# Patient Record
Sex: MALE | Race: WHITE | NOT HISPANIC OR LATINO | URBAN - METROPOLITAN AREA
[De-identification: names, ages, dates, MRNs, and addresses within clinical notes are randomized per-mention and may not be internally consistent; named-entity substitution may affect disease eponyms.]

---

## 2017-09-01 ENCOUNTER — INPATIENT (INPATIENT)
Facility: HOSPITAL | Age: 82
LOS: 9 days | Discharge: NURSING, REHAB OR LONG-TERM CARE | DRG: 177 | End: 2017-09-11
Attending: HOSPITALIST | Admitting: HOSPITALIST
Payer: MEDICARE

## 2017-09-01 VITALS
DIASTOLIC BLOOD PRESSURE: 83 MMHG | HEART RATE: 110 BPM | WEIGHT: 160.06 LBS | OXYGEN SATURATION: 98 % | SYSTOLIC BLOOD PRESSURE: 148 MMHG | RESPIRATION RATE: 20 BRPM | TEMPERATURE: 97 F | HEIGHT: 70 IN

## 2017-09-01 DIAGNOSIS — N17.9 ACUTE KIDNEY FAILURE, UNSPECIFIED: ICD-10-CM

## 2017-09-01 DIAGNOSIS — R41.82 ALTERED MENTAL STATUS, UNSPECIFIED: ICD-10-CM

## 2017-09-01 DIAGNOSIS — E87.0 HYPEROSMOLALITY AND HYPERNATREMIA: ICD-10-CM

## 2017-09-01 DIAGNOSIS — F03.90 UNSPECIFIED DEMENTIA, UNSPECIFIED SEVERITY, WITHOUT BEHAVIORAL DISTURBANCE, PSYCHOTIC DISTURBANCE, MOOD DISTURBANCE, AND ANXIETY: ICD-10-CM

## 2017-09-01 DIAGNOSIS — R54 AGE-RELATED PHYSICAL DEBILITY: ICD-10-CM

## 2017-09-01 DIAGNOSIS — E86.0 DEHYDRATION: ICD-10-CM

## 2017-09-01 LAB
ALBUMIN SERPL ELPH-MCNC: 3.2 G/DL — LOW (ref 3.3–5)
ALP SERPL-CCNC: 80 U/L — SIGNIFICANT CHANGE UP (ref 30–120)
ALT FLD-CCNC: <10 U/L DA — LOW (ref 10–60)
ANION GAP SERPL CALC-SCNC: 13 MMOL/L — SIGNIFICANT CHANGE UP (ref 5–17)
ANION GAP SERPL CALC-SCNC: 14 MMOL/L — SIGNIFICANT CHANGE UP (ref 5–17)
ANION GAP SERPL CALC-SCNC: 20 MMOL/L — HIGH (ref 5–17)
APPEARANCE UR: ABNORMAL
APTT BLD: 30.3 SEC — SIGNIFICANT CHANGE UP (ref 27.5–37.4)
AST SERPL-CCNC: 13 U/L — SIGNIFICANT CHANGE UP (ref 10–40)
BASOPHILS # BLD AUTO: 0.3 K/UL — HIGH (ref 0–0.2)
BASOPHILS NFR BLD AUTO: 1.6 % — SIGNIFICANT CHANGE UP (ref 0–2)
BILIRUB SERPL-MCNC: 0.5 MG/DL — SIGNIFICANT CHANGE UP (ref 0.2–1.2)
BILIRUB UR-MCNC: ABNORMAL
BUN SERPL-MCNC: 73 MG/DL — HIGH (ref 7–23)
BUN SERPL-MCNC: 75 MG/DL — HIGH (ref 7–23)
BUN SERPL-MCNC: 89 MG/DL — HIGH (ref 7–23)
CALCIUM SERPL-MCNC: 7.8 MG/DL — LOW (ref 8.4–10.5)
CALCIUM SERPL-MCNC: 8.3 MG/DL — LOW (ref 8.4–10.5)
CALCIUM SERPL-MCNC: 9.6 MG/DL — SIGNIFICANT CHANGE UP (ref 8.4–10.5)
CHLORIDE SERPL-SCNC: 123 MMOL/L — HIGH (ref 96–108)
CHLORIDE SERPL-SCNC: 125 MMOL/L — HIGH (ref 96–108)
CHLORIDE SERPL-SCNC: 126 MMOL/L — HIGH (ref 96–108)
CO2 SERPL-SCNC: 21 MMOL/L — LOW (ref 22–31)
CO2 SERPL-SCNC: 21 MMOL/L — LOW (ref 22–31)
CO2 SERPL-SCNC: 22 MMOL/L — SIGNIFICANT CHANGE UP (ref 22–31)
COLOR SPEC: YELLOW — SIGNIFICANT CHANGE UP
CREAT SERPL-MCNC: 3.39 MG/DL — HIGH (ref 0.5–1.3)
CREAT SERPL-MCNC: 3.46 MG/DL — HIGH (ref 0.5–1.3)
CREAT SERPL-MCNC: 3.93 MG/DL — HIGH (ref 0.5–1.3)
DIFF PNL FLD: ABNORMAL
EOSINOPHIL # BLD AUTO: 0.4 K/UL — SIGNIFICANT CHANGE UP (ref 0–0.5)
EOSINOPHIL NFR BLD AUTO: 2.1 % — SIGNIFICANT CHANGE UP (ref 0–6)
FLUAV SPEC QL CULT: NEGATIVE — SIGNIFICANT CHANGE UP
FLUBV AG SPEC QL IA: NEGATIVE — SIGNIFICANT CHANGE UP
GLUCOSE SERPL-MCNC: 100 MG/DL — HIGH (ref 70–99)
GLUCOSE SERPL-MCNC: 118 MG/DL — HIGH (ref 70–99)
GLUCOSE SERPL-MCNC: 133 MG/DL — HIGH (ref 70–99)
GLUCOSE UR QL: NEGATIVE MG/DL — SIGNIFICANT CHANGE UP
HCT VFR BLD CALC: 46.7 % — SIGNIFICANT CHANGE UP (ref 39–50)
HCT VFR BLD CALC: 56.9 % — HIGH (ref 39–50)
HGB BLD-MCNC: 14.2 G/DL — SIGNIFICANT CHANGE UP (ref 13–17)
HGB BLD-MCNC: 17 G/DL — SIGNIFICANT CHANGE UP (ref 13–17)
INR BLD: 1.13 RATIO — SIGNIFICANT CHANGE UP (ref 0.88–1.16)
KETONES UR-MCNC: ABNORMAL
LACTATE SERPL-SCNC: 1.8 MMOL/L — SIGNIFICANT CHANGE UP (ref 0.7–2)
LEUKOCYTE ESTERASE UR-ACNC: ABNORMAL
LYMPHOCYTES # BLD AUTO: 1.8 K/UL — SIGNIFICANT CHANGE UP (ref 1–3.3)
LYMPHOCYTES # BLD AUTO: 9.4 % — LOW (ref 13–44)
MCHC RBC-ENTMCNC: 29.3 PG — SIGNIFICANT CHANGE UP (ref 27–34)
MCHC RBC-ENTMCNC: 29.9 GM/DL — LOW (ref 32–36)
MCHC RBC-ENTMCNC: 29.9 PG — SIGNIFICANT CHANGE UP (ref 27–34)
MCHC RBC-ENTMCNC: 30.5 GM/DL — LOW (ref 32–36)
MCV RBC AUTO: 98 FL — SIGNIFICANT CHANGE UP (ref 80–100)
MCV RBC AUTO: 98.2 FL — SIGNIFICANT CHANGE UP (ref 80–100)
MONOCYTES # BLD AUTO: 1.1 K/UL — HIGH (ref 0–0.9)
MONOCYTES NFR BLD AUTO: 5.7 % — SIGNIFICANT CHANGE UP (ref 2–14)
NEUTROPHILS # BLD AUTO: 15.6 K/UL — HIGH (ref 1.8–7.4)
NEUTROPHILS NFR BLD AUTO: 81.2 % — HIGH (ref 43–77)
NITRITE UR-MCNC: NEGATIVE — SIGNIFICANT CHANGE UP
PH UR: 5 — SIGNIFICANT CHANGE UP (ref 5–8)
PLATELET # BLD AUTO: 406 K/UL — HIGH (ref 150–400)
PLATELET # BLD AUTO: 499 K/UL — HIGH (ref 150–400)
POTASSIUM SERPL-MCNC: 4.2 MMOL/L — SIGNIFICANT CHANGE UP (ref 3.5–5.3)
POTASSIUM SERPL-MCNC: 4.9 MMOL/L — SIGNIFICANT CHANGE UP (ref 3.5–5.3)
POTASSIUM SERPL-MCNC: 5.2 MMOL/L — SIGNIFICANT CHANGE UP (ref 3.5–5.3)
POTASSIUM SERPL-SCNC: 4.2 MMOL/L — SIGNIFICANT CHANGE UP (ref 3.5–5.3)
POTASSIUM SERPL-SCNC: 4.9 MMOL/L — SIGNIFICANT CHANGE UP (ref 3.5–5.3)
POTASSIUM SERPL-SCNC: 5.2 MMOL/L — SIGNIFICANT CHANGE UP (ref 3.5–5.3)
PROT SERPL-MCNC: 8.4 G/DL — HIGH (ref 6–8.3)
PROT UR-MCNC: 30 MG/DL
PROTHROM AB SERPL-ACNC: 12.3 SEC — SIGNIFICANT CHANGE UP (ref 9.8–12.7)
RBC # BLD: 4.75 M/UL — SIGNIFICANT CHANGE UP (ref 4.2–5.8)
RBC # BLD: 5.8 M/UL — SIGNIFICANT CHANGE UP (ref 4.2–5.8)
RBC # FLD: 15.8 % — HIGH (ref 10.3–14.5)
RBC # FLD: 15.9 % — HIGH (ref 10.3–14.5)
SODIUM SERPL-SCNC: 160 MMOL/L — CRITICAL HIGH (ref 135–145)
SODIUM SERPL-SCNC: 161 MMOL/L — CRITICAL HIGH (ref 135–145)
SODIUM SERPL-SCNC: 164 MMOL/L — CRITICAL HIGH (ref 135–145)
SP GR SPEC: 1.02 — SIGNIFICANT CHANGE UP (ref 1.01–1.02)
T3 SERPL-MCNC: 54 NG/DL — LOW (ref 80–200)
T4 AB SER-ACNC: 5.5 UG/DL — SIGNIFICANT CHANGE UP (ref 4.6–12)
TROPONIN I SERPL-MCNC: 0.01 NG/ML — LOW (ref 0.02–0.06)
TSH SERPL-MCNC: 2.08 UIU/ML — SIGNIFICANT CHANGE UP (ref 0.27–4.2)
UROBILINOGEN FLD QL: 1 MG/DL
VIT B12 SERPL-MCNC: 770 PG/ML — SIGNIFICANT CHANGE UP (ref 243–894)
WBC # BLD: 17.6 K/UL — HIGH (ref 3.8–10.5)
WBC # BLD: 19.3 K/UL — HIGH (ref 3.8–10.5)
WBC # FLD AUTO: 17.6 K/UL — HIGH (ref 3.8–10.5)
WBC # FLD AUTO: 19.3 K/UL — HIGH (ref 3.8–10.5)

## 2017-09-01 PROCEDURE — 93010 ELECTROCARDIOGRAM REPORT: CPT

## 2017-09-01 PROCEDURE — 99223 1ST HOSP IP/OBS HIGH 75: CPT | Mod: AI

## 2017-09-01 PROCEDURE — 71010: CPT | Mod: 26

## 2017-09-01 PROCEDURE — 99285 EMERGENCY DEPT VISIT HI MDM: CPT

## 2017-09-01 PROCEDURE — 70450 CT HEAD/BRAIN W/O DYE: CPT | Mod: 26

## 2017-09-01 RX ORDER — SODIUM CHLORIDE 9 MG/ML
1000 INJECTION, SOLUTION INTRAVENOUS
Qty: 0 | Refills: 0 | Status: DISCONTINUED | OUTPATIENT
Start: 2017-09-01 | End: 2017-09-01

## 2017-09-01 RX ORDER — PIPERACILLIN AND TAZOBACTAM 4; .5 G/20ML; G/20ML
4.5 INJECTION, POWDER, LYOPHILIZED, FOR SOLUTION INTRAVENOUS ONCE
Qty: 0 | Refills: 0 | Status: DISCONTINUED | OUTPATIENT
Start: 2017-09-01 | End: 2017-09-01

## 2017-09-01 RX ORDER — RISPERIDONE 4 MG/1
0.25 TABLET ORAL AT BEDTIME
Qty: 0 | Refills: 0 | Status: DISCONTINUED | OUTPATIENT
Start: 2017-09-01 | End: 2017-09-11

## 2017-09-01 RX ORDER — HEPARIN SODIUM 5000 [USP'U]/ML
5000 INJECTION INTRAVENOUS; SUBCUTANEOUS EVERY 12 HOURS
Qty: 0 | Refills: 0 | Status: DISCONTINUED | OUTPATIENT
Start: 2017-09-01 | End: 2017-09-11

## 2017-09-01 RX ORDER — ACETAMINOPHEN 500 MG
725 TABLET ORAL EVERY 8 HOURS
Qty: 0 | Refills: 0 | Status: DISCONTINUED | OUTPATIENT
Start: 2017-09-01 | End: 2017-09-01

## 2017-09-01 RX ORDER — SODIUM CHLORIDE 9 MG/ML
1500 INJECTION INTRAMUSCULAR; INTRAVENOUS; SUBCUTANEOUS ONCE
Qty: 0 | Refills: 0 | Status: COMPLETED | OUTPATIENT
Start: 2017-09-01 | End: 2017-09-01

## 2017-09-01 RX ORDER — ACETAMINOPHEN 500 MG
650 TABLET ORAL EVERY 8 HOURS
Qty: 0 | Refills: 0 | Status: DISCONTINUED | OUTPATIENT
Start: 2017-09-01 | End: 2017-09-11

## 2017-09-01 RX ORDER — VANCOMYCIN HCL 1 G
1000 VIAL (EA) INTRAVENOUS ONCE
Qty: 0 | Refills: 0 | Status: COMPLETED | OUTPATIENT
Start: 2017-09-01 | End: 2017-09-01

## 2017-09-01 RX ORDER — PIPERACILLIN AND TAZOBACTAM 4; .5 G/20ML; G/20ML
3.38 INJECTION, POWDER, LYOPHILIZED, FOR SOLUTION INTRAVENOUS ONCE
Qty: 0 | Refills: 0 | Status: COMPLETED | OUTPATIENT
Start: 2017-09-01 | End: 2017-09-01

## 2017-09-01 RX ORDER — SODIUM CHLORIDE 9 MG/ML
1000 INJECTION, SOLUTION INTRAVENOUS
Qty: 0 | Refills: 0 | Status: DISCONTINUED | OUTPATIENT
Start: 2017-09-01 | End: 2017-09-11

## 2017-09-01 RX ADMIN — Medication 250 MILLIGRAM(S): at 10:44

## 2017-09-01 RX ADMIN — SODIUM CHLORIDE 100 MILLILITER(S): 9 INJECTION, SOLUTION INTRAVENOUS at 17:54

## 2017-09-01 RX ADMIN — PIPERACILLIN AND TAZOBACTAM 200 GRAM(S): 4; .5 INJECTION, POWDER, LYOPHILIZED, FOR SOLUTION INTRAVENOUS at 10:44

## 2017-09-01 RX ADMIN — SODIUM CHLORIDE 1500 MILLILITER(S): 9 INJECTION INTRAMUSCULAR; INTRAVENOUS; SUBCUTANEOUS at 10:12

## 2017-09-01 RX ADMIN — SODIUM CHLORIDE 100 MILLILITER(S): 9 INJECTION, SOLUTION INTRAVENOUS at 22:34

## 2017-09-01 RX ADMIN — HEPARIN SODIUM 5000 UNIT(S): 5000 INJECTION INTRAVENOUS; SUBCUTANEOUS at 21:53

## 2017-09-01 RX ADMIN — RISPERIDONE 0.25 MILLIGRAM(S): 4 TABLET ORAL at 21:53

## 2017-09-01 NOTE — ED ADULT TRIAGE NOTE - NS ED TRIAGE AVPU SCALE
Alert-The patient is alert, awake and responds to voice. The patient is oriented to time, place, and person. The triage nurse is able to obtain subjective information. Verbal - The patient responds to verbal stimuli by opening their eyes when someone speaks to them. The patient is not fully oriented to time, place, or person.

## 2017-09-01 NOTE — CONSULT NOTE ADULT - SUBJECTIVE AND OBJECTIVE BOX
HPI:  88 male hx Dementia came from homewith CC of  AMS/lethargy per home  aide. No fever chills n/v/diarrhea. Has been weak with poor po intake. In ER  No fever. WBC 19K with BRAYDEN and hypernatremia. RVP with Enterovirus. Per  documentation pt recently treated for sinusitis with Augmentin x 10 days.  Pt unable to provide hx.     Infectious Disease consult was called to evaluate pt due to leukocytosis and  RVP +Enterovirus    Past Medical & Surgical Hx:  PAST MEDICAL & SURGICAL HISTORY:  Dementia with behavioral disturbance  No significant past surgical history      Social History--  EtOH: denies  Tobacco: denies   Drug Use: denies   Lives at home    Travel/Environmental/Occupational History:  NONE    FAMILY HISTORY:  No pertinent family history in first degree relatives      No Known Allergies    Home Medications:   * Patient Currently Takes Medications as of 01-Sep-2017 08:16 documented in Prescription Writer  · 	risperiDONE 0.25 mg oral tablet:  orally once a day (at bedtime), Last Dose Taken:    · 	Augmentin 875 mg-125 mg oral tablet: 1 tab(s) orally every 12 hours, Last Dose Taken:         Current Inpatient Medications :    ANTIBIOTICS:   NONE    OTHER RELEVANT MEDICATIONS :  heparin  Injectable 5000 Unit(s) SubCutaneous every 12 hours  risperiDONE   Tablet 0.25 milliGRAM(s) Oral at bedtime  acetaminophen    Suspension. 650 milliGRAM(s) Oral every 8 hours PRN  dextrose 5%. 1000 milliLiter(s) IV Continuous <Continuous>          REVIEW OF SYSTEMS:    ROS:  Unable to obtain due to : confused and lethargic    I&O's Detail    01 Sep 2017 07:01  -  01 Sep 2017 23:07  --------------------------------------------------------  IN:    dextrose 5% + sodium chloride 0.45%.: 300 mL    dextrose 5%.: 100 mL    Oral Fluid: 50 mL  Total IN: 450 mL    OUT:  Total OUT: 0 mL    Total NET: 450 mL    Physical Exam:  Vital Signs Last 24 Hrs  T(C): 36.1 (01 Sep 2017 20:00), Max: 36.6 (01 Sep 2017 11:22)  T(F): 97 (01 Sep 2017 20:00), Max: 97.8 (01 Sep 2017 11:22)  HR: 77 (01 Sep 2017 22:00) (69 - 110)  BP: 124/62 (01 Sep 2017 22:00) (94/56 - 148/83)  BP(mean): 80 (01 Sep 2017 22:00) (69 - 93)  RR: 22 (01 Sep 2017 22:00) (13 - 22)  SpO2: 100% (01 Sep 2017 22:00) (93% - 100%)  Height (cm): 175.26 (09-01 @ 11:22)  Weight (kg): 72.6 (09-01 @ 11:22)  BMI (kg/m2): 23.6 (09-01 @ 11:22)  BSA (m2): 1.88 (09-01 @ 11:22)      GEN: confused lethargic no distress  HEENT: normocephalic and atraumatic. EOMI. JOSE ALFREDO. Moist mucosa. Clear Posterior pharynx.  NECK: Supple. No carotid bruits.  No lymphadenopathy or thyromegaly.  LUNGS: Clear to auscultation.  HEART: Regular rate and rhythm without murmur.  ABDOMEN: Soft, nontender, and nondistended.  Positive bowel sounds.  No hepatosplenomegaly was noted.  EXTREMITIES: Without any cyanosis, clubbing, rash, lesions or edema.  NEUROLOGIC:  confused lethargic no focal deficit  SKIN: No ulceration or induration present.    Labs:     09-01    161<HH>  |  126<H>  |  73<H>  ----------------------------<  133<H>  4.2   |  22  |  3.39<H>    Ca    7.8<L>      01 Sep 2017 21:50    TPro  8.4<H>  /  Alb  3.2<L>  /  TBili  0.5  /  DBili  x   /  AST  13  /  ALT  <10<L>  /  AlkPhos  80  09-01                          14.2   17.6  )-----------( 406      ( 01 Sep 2017 17:17 )             46.7       PT/INR - ( 01 Sep 2017 08:53 )   PT: 12.3 sec;   INR: 1.13 ratio         PTT - ( 01 Sep 2017 08:53 )  PTT:30.3 sec  Urinalysis Basic - ( 01 Sep 2017 09:52 )    Color: x / Appearance: x / SG: x / pH: x  Gluc: x / Ketone: x  / Bili: x / Urobili: x   Blood: x / Protein: x / Nitrite: x   Leuk Esterase: x / RBC: 0-2 /HPF / WBC 3-5   Sq Epi: x / Non Sq Epi: Few / Bacteria: Few      LIVER FUNCTIONS - ( 01 Sep 2017 08:53 )  Alb: 3.2 g/dL / Pro: 8.4 g/dL / ALK PHOS: 80 U/L / ALT: <10 U/L DA / AST: 13 U/L / GGT: x           CARDIAC MARKERS ( 01 Sep 2017 08:53 )  .005 ng/mL / x     / x     / x     / x        Rapid Respiratory Viral Panel (09.01.17 @ 09:52)    Entero/Rhinovirus (RapRVP): Detected    Rapid RVP Result: Detected: The [employer] RVP Rapid uses polymerase chain reaction (PCR) and melt  curve analysis to screen for adenovirus; coronavirus HKU1, NL63, 229E,  OC43; human metapneumovirus (hMPV); human enterovirus/rhinovirus  (Entero/RV); influenza A; influenza A/H1;Detected: influenza A/H3; influenza  A/H1-2009; influenza B; parainfluenza viruses 1, 2, 3, 4; respiratory  syncytial virus; Bordetella pertussis; Mycoplasma pneumoniae; and  Chlamydophila pneumoniae.        RECENT CULTURES:  PENDING      RADIOLOGY & ADDITIONAL STUDIES:  EXAM:  CHEST-PORTABLE URGENT                          PROCEDURE DATE:  09/01/2017          INTERPRETATION:  Clinical information: Altered mental status    No prior studies present for comparison  Portable study, 9:06 AM  Cardiac monitor leads present. Elevated diaphragm, shallow inspiration.   The aorta shows atherosclerotic changes. The lungs are clear. There is no   sign of infiltrate effusion or congestive failure. Heart size is within   normal limits. Hilar regions and mediastinal contours are unremarkable.    Old left rib fracture present.    IMPRESSION:    No active disease.        Assessment :   88 year old male Dementia admitted with mental status change and BRAYDEN   Severe dehydration with hypernatremia  Enterovirus infection ?bronchitis though no pulm symptoms Poss false positive  Leukocytosis reactive    Plan :   Defer antibiotics  Trend wbc  Fu cultures  IVF  Monitor renal fx  Aspiration precautions    Continue with present regiment .  Appropriate use of antibiotics and adverse effects reviewed.      > 35 minutes spent in direct patient care reviewing  the notes, lab data/ imaging , discussion with multidisciplinary team. All questions were addressed and answered to the best of my capacity .    Thank you for allowing me to participate in the care of your patient .    Rik Erwin MD  Infectious Disease  [phone]

## 2017-09-01 NOTE — ED ADULT NURSE NOTE - OBJECTIVE STATEMENT
88 year old male sent into ER by home health aid after she states patient not in his usual mental state.   Pt arrives awake unable to speak  mucous membranes extremely dry  positive tenting noted of skin

## 2017-09-01 NOTE — CONSULT NOTE ADULT - PROBLEM SELECTOR RECOMMENDATION 4
known hx of dementia  supportive care  oral and skin care  asp prec  HOB elevation  pt is DNR DNI  prognosis guarded  will follow

## 2017-09-01 NOTE — SWALLOW BEDSIDE ASSESSMENT ADULT - COMMENTS
Pt alert, confused, admitted with encephalopathy.  Pt with xerostomia, oral care attempted h/e pt became agitated.  P also became agitated during PO trials.  He presents with oropharyngeal dysphagia characterized by reduced orientation, reduced oral grading, labored manipulation of the bolus, latent AP transport, swallow delay. Over s/s of aspiration noted for thin liquids.  Pt at increased risk of aspiration secondary to cognitive deficits. he is safely tolerating dysphagia 1 puree with nectar thickened liquids. Discussed with Dr. Arzate.

## 2017-09-01 NOTE — PROGRESS NOTE ADULT - SUBJECTIVE AND OBJECTIVE BOX
Patient is a 88y old  Male who presents with a chief complaint of lethargy/AMS (01 Sep 2017 11:05)      BRIEF HOSPITAL COURSE: 87 y/o M with a h/o dementia presents from home (with HHA) with AMS, lethargy, unable to ambulate with walker. As per report, poor PO intake recently. Clinically dehydrated. Noted to be profoundly hypernatremic (Na: 164) and with BRAYDEN. (+)Entero/rhinovirus. CT head neg for acute events. Patient is confused, disoriented, and lethargic, not following commands and not answering questions. Will occasionally shout and moan at random. No report of seizure activity. Hemodynamically stable.       PAST MEDICAL & SURGICAL HISTORY:  Dementia with behavioral disturbance  No significant past surgical history      Review of Systems: Unable to obtain secondary to mental status.  CONSTITUTIONAL: No fever, chills, or fatigue  EYES: No eye pain, visual disturbances, or discharge  ENMT:  No difficulty hearing, tinnitus, vertigo; No sinus or throat pain  NECK: No pain or stiffness  RESPIRATORY: No cough, wheezing, chills or hemoptysis; No shortness of breath  CARDIOVASCULAR: No chest pain, palpitations, dizziness, or leg swelling  GASTROINTESTINAL: No abdominal or epigastric pain. No nausea, vomiting, or hematemesis; No diarrhea or constipation. No melena or hematochezia.  GENITOURINARY: No dysuria, frequency, hematuria, or incontinence  NEUROLOGICAL: No headaches, memory loss, loss of strength, numbness, or tremors  SKIN: No itching, burning, rashes, or lesions   MUSCULOSKELETAL: No joint pain or swelling; No muscle, back, or extremity pain  PSYCHIATRIC: No depression, anxiety, mood swings, or difficulty sleeping      Medications:        risperiDONE   Tablet 0.25 milliGRAM(s) Oral at bedtime  acetaminophen    Suspension. 650 milliGRAM(s) Oral every 8 hours PRN  heparin  Injectable 5000 Unit(s) SubCutaneous every 12 hours  dextrose 5% + sodium chloride 0.45%. 1000 milliLiter(s) IV Continuous <Continuous>          ICU Vital Signs Last 24 Hrs  T(C): 36.4 (01 Sep 2017 18:45), Max: 36.6 (01 Sep 2017 11:22)  T(F): 97.5 (01 Sep 2017 18:45), Max: 97.8 (01 Sep 2017 11:22)  HR: 69 (01 Sep 2017 20:00) (69 - 110)  BP: 122/50 (01 Sep 2017 20:00) (94/56 - 148/83)  BP(mean): 69 (01 Sep 2017 20:00) (69 - 93)  ABP: --  ABP(mean): --  RR: 17 (01 Sep 2017 20:00) (13 - 21)  SpO2: 93% (01 Sep 2017 20:00) (93% - 100%)          I&O's Detail    01 Sep 2017 07:01  -  01 Sep 2017 21:25  --------------------------------------------------------  IN:    dextrose 5% + sodium chloride 0.45%.: 200 mL  Total IN: 200 mL    OUT:  Total OUT: 0 mL    Total NET: 200 mL            LABS:                        14.2   17.6  )-----------( 406      ( 01 Sep 2017 17:17 )             46.7     09-01    160<HH>  |  125<H>  |  75<H>  ----------------------------<  118<H>  4.9   |  21<L>  |  3.46<H>    Ca    8.3<L>      01 Sep 2017 17:17    TPro  8.4<H>  /  Alb  3.2<L>  /  TBili  0.5  /  DBili  x   /  AST  13  /  ALT  <10<L>  /  AlkPhos  80  09-01      CARDIAC MARKERS ( 01 Sep 2017 08:53 )  .005 ng/mL / x     / x     / x     / x          CAPILLARY BLOOD GLUCOSE        PT/INR - ( 01 Sep 2017 08:53 )   PT: 12.3 sec;   INR: 1.13 ratio         PTT - ( 01 Sep 2017 08:53 )  PTT:30.3 sec  Urinalysis Basic - ( 01 Sep 2017 09:52 )    Color: x / Appearance: x / SG: x / pH: x  Gluc: x / Ketone: x  / Bili: x / Urobili: x   Blood: x / Protein: x / Nitrite: x   Leuk Esterase: x / RBC: 0-2 /HPF / WBC 3-5   Sq Epi: x / Non Sq Epi: Few / Bacteria: Few      CULTURES:  Rapid RVP Result: Detected (01 Sep 2017 09:52)      Physical Examination:    General: Frail, cachectic, Confused, disoriented, does not respond appropriately to questioning, does not follow commands    HEENT: Pupils equal, reactive to light.  Symmetric.    PULM: diminished bilaterally, no significant sputum production    CVS: Regular rate and rhythm, no murmurs, rubs, or gallops    ABD: Soft, nondistended, nontender, normoactive bowel sounds, no masses    EXT: No edema, nontender    SKIN: Warm and well perfused, no rashes noted.    RADIOLOGY: < from: Xray Chest 1 View AP- PORTABLE-Urgent (09.01.17 @ 09:14) >  No prior studies present for comparison  Portable study, 9:06 AM  Cardiac monitor leads present. Elevated diaphragm, shallow inspiration.   The aorta shows atherosclerotic changes. The lungs are clear. There is no   sign of infiltrate effusion or congestive failure. Heart size is within   normal limits. Hilar regions and mediastinal contours are unremarkable.    Old left rib fracture present.    IMPRESSION:    No active disease.    < from: CT Head No Cont (09.01.17 @ 09:06) >  Images demonstrate  atrophic changes. Mild periventricular white matter   hypoattenuation, microvascular ischemic change. There is no sign of   mass-effect midline shift epidural or subdural collection. There is no   sign of acute or recent hemorrhage. No unusual calcifications are noted.    The calvarium appears intact. The right maxillary sinus is completely   opacified. There is disruption of the medial wall of the right maxillary   sinus with extension  of softtissue material into  the right nasal   cavity. Mucosal disease, right ethmoid and right frontal sinus.       IMPRESSION: No evidence of acute intracranial pathology, see above report.        CRITICAL CARE TIME SPENT: 44 mins

## 2017-09-01 NOTE — H&P ADULT - HISTORY OF PRESENT ILLNESS
88 male from home came in with AMS/lethargy.  Patient likely has dementia and is altered from baseline - unable to provide history.  Spoke to Valery - ?HCP/caretaker; she will stop by late morning to bring in papers such as advance directives, HCP etc.  Per ER RN/DO, patient able to ambulate with walker, eats soft vegan food.

## 2017-09-01 NOTE — SWALLOW BEDSIDE ASSESSMENT ADULT - SWALLOW EVAL: RECOMMENDED FEEDING/EATING TECHNIQUES
check mouth frequently for oral residue/pocketing/crush medication (when feasible)/no straws/position upright (90 degrees)/small sips/bites/allow for swallow between intakes/alternate food with liquid/maintain upright posture during/after eating for 30 mins/oral hygiene

## 2017-09-01 NOTE — H&P ADULT - NSHPLABSRESULTS_GEN_ALL_CORE
WBC Count: 19.3 K/uL <H> (09-01 @ 08:53)  Platelet Count - Automated: 499 K/uL <H> (09-01 @ 08:53)  Hematocrit: 56.9 % <H> (09-01 @ 08:53)  RBC Count: 5.80 M/uL (09-01 @ 08:53)  Hemoglobin: 17.0 g/dL (09-01 @ 08:53)      09-01    164<HH>  |  123<H>  |  89<H>  ----------------------------<  100<H>  5.2   |  21<L>  |  3.93<H>    Ca    9.6      01 Sep 2017 08:53    TPro  8.4<H>  /  Alb  3.2<L>  /  TBili  0.5  /  DBili  x   /  AST  13  /  ALT  <10<L>  /  AlkPhos  80  09-01          CARDIAC MARKERS ( 01 Sep 2017 08:53 )  .005 ng/mL / x     / x     / x     / x            PT/INR - ( 01 Sep 2017 08:53 )   PT: 12.3 sec;   INR: 1.13 ratio         PTT - ( 01 Sep 2017 08:53 )  PTT:30.3 sec    Urinalysis Basic - ( 01 Sep 2017 09:52 )    Color: x / Appearance: x / SG: x / pH: x  Gluc: x / Ketone: x  / Bili: x / Urobili: x   Blood: x / Protein: x / Nitrite: x   Leuk Esterase: x / RBC: 0-2 /HPF / WBC 3-5   Sq Epi: x / Non Sq Epi: Few / Bacteria: Few        Alanine Aminotransferase (ALT/SGPT): <10 U/L DA <L> (09-01 @ 08:53)  Alkaline Phosphatase, Serum: 80 U/L (09-01 @ 08:53)  Bilirubin Total, Serum: 0.5 mg/dL (09-01 @ 08:53)  Aspartate Aminotransferase (AST/SGOT): 13 U/L (09-01 @ 08:53)  Albumin, Serum: 3.2 g/dL <L> (09-01 @ 08:53)      Lactate, Blood: 1.8 mmol/L (09-01 @ 08:53)    CXR: clear  CT head: no m/s/b

## 2017-09-01 NOTE — CONSULT NOTE ADULT - PROBLEM SELECTOR RECOMMENDATION 9
metabolic hypernatremia  possible viral syndrome  BRAYDEN
fall prec  skin care  oral care  pt is DNR DNI as per HCP, she will bring paperwork  supportive care  nutrition  pt is a Vegan as per HCP

## 2017-09-01 NOTE — PHYSICAL THERAPY INITIAL EVALUATION ADULT - ADDITIONAL COMMENTS
Pt lives in home has 24hour HHA/7 days. Uses a cane for ambulation. Owns a wheelchair. Needs assist with dressing. As per aide independent with toileting.

## 2017-09-01 NOTE — SWALLOW BEDSIDE ASSESSMENT ADULT - ORAL PHASE
Decreased anterior-posterior movement of the bolus Decreased anterior-posterior movement of the bolus/Delayed oral transit time/Lingual stasis

## 2017-09-01 NOTE — CONSULT NOTE ADULT - SUBJECTIVE AND OBJECTIVE BOX
Date/Time Patient Seen:  		  Referring MD:   Data Reviewed	       Patient is a 88y old  Male who presents with a chief complaint of weakness, ams, frailty, dec PO intake      Subjective/HPI    seen and examined, poor historian, information obtained from HCP and ER provider note  pt in bed  seen and examined, vs and meds reviewed, documentation reviewed, no old records   as per HCP - lives at home with HHA and has minimal indep. function  pt is a Vegan, no recent hospitalization as per HCP       PAST MEDICAL & SURGICAL HISTORY:  No pertinent past medical history  No significant past surgical history        Medication list         MEDICATIONS  (STANDING):    MEDICATIONS  (PRN):         Vitals log        ICU Vital Signs Last 24 Hrs  T(C): 36.2 (01 Sep 2017 08:17), Max: 36.2 (01 Sep 2017 08:17)  T(F): 97.2 (01 Sep 2017 08:17), Max: 97.2 (01 Sep 2017 08:17)  HR: 92 (01 Sep 2017 09:48) (92 - 110)  BP: 94/56 (01 Sep 2017 09:48) (94/56 - 148/83)  BP(mean): --  ABP: --  ABP(mean): --  RR: 21 (01 Sep 2017 09:48) (20 - 21)  SpO2: 99% (01 Sep 2017 09:48) (98% - 99%)           Input and Output:  I&O's Detail      Lab Data                        17.0   19.3  )-----------( 499      ( 01 Sep 2017 08:53 )             56.9     09-01    164<HH>  |  123<H>  |  89<H>  ----------------------------<  100<H>  5.2   |  21<L>  |  3.93<H>    Ca    9.6      01 Sep 2017 08:53    TPro  8.4<H>  /  Alb  3.2<L>  /  TBili  0.5  /  DBili  x   /  AST  13  /  ALT  <10<L>  /  AlkPhos  80  09-01      CARDIAC MARKERS ( 01 Sep 2017 08:53 )  .005 ng/mL / x     / x     / x     / x        non smoker  non drinker  [employer]ired [employer]  lives at home  has a HHA      Review of Systems	  weak  frail  ams      Objective     Physical Examination    head at  heart - s1s2  lungs - dec BS  abd - soft  extr - dec EDEMA      Pertinent Lab findings & Imaging      Meier:  NO   Adequate UO     I&O's Detail           Discussed with:     Cultures:	        Radiology    EXAM:  CHEST-PORTABLE URGENT                                  PROCEDURE DATE:  09/01/2017          INTERPRETATION:  Clinical information: Altered mental status    No prior studies present for comparison  Portable study, 9:06 AM  Cardiac monitor leads present. Elevated diaphragm, shallow inspiration.   The aorta shows atherosclerotic changes. The lungs are clear. There is no   sign of infiltrate effusion or congestive failure. Heart size is within   normal limits. Hilar regions and mediastinal contours are unremarkable.    Old left rib fracture present.    IMPRESSION:    No active disease.                  MYRTLE MENENDEZ M.D.,ATTENDING RADIOLOGIST  This document has been electronically signed. Sep  1 2017  9:38AM

## 2017-09-01 NOTE — PROGRESS NOTE ADULT - PROBLEM SELECTOR PLAN 1
Likely related to dehydration. Continue IVF hydration with D5+0.45% NS @ 100 mL/hr. Repeat BMP showed Na of 160. Will trend BMP q 4 hrs. Will not overcorrect Na, max 10 mmol/L in initial 24 hours. Monitor mental status, neuro checks. Likely related to dehydration. Continue IVF hydration with D5+0.45% NS @ 100 mL/hr. Repeat BMP showed Na of 160. Will trend BMP q 4 hrs. Will not overcorrect Na, max 10 mmol/L in initial 24 hours. Monitor mental status, neuro checks. Will encourage PO fluid intake as long as patient is able to swallow and is protecting airway. Likely related to dehydration. Repeat BMP showed Na of 161, up from prior repeat of 160. Will change IVF to D5W @ 100mL/hr. Will trend BMP q 4 hrs. Will not overcorrect Na, max 10 mmol/L in initial 24 hours. Monitor mental status, neuro checks. Will encourage PO fluid intake as long as patient is able to swallow and is protecting airway. As per RN, patient is at risk for aspiration, will hold PO fluids for now.

## 2017-09-01 NOTE — CONSULT NOTE ADULT - PROBLEM SELECTOR RECOMMENDATION 2
supportive treatment
BRAYDEN  IVF  I and O  monitor labs  monitor lytes and replete as needed  eval for obstruction

## 2017-09-01 NOTE — CONSULT NOTE ADULT - ASSESSMENT
·	Hypernatremia: Secondary to decreased free water intake  ·	BRAYDEN: Prerenal azotemia, R/o retention    Start IVF. Half NS for now and switch to D5W after 1-2 liters. Will follow electrolytes and renal function trend. Renal sonogram. Encourage PO intake as tolerated. Labs as ordered. Avoid nephrotoxic meds as possible. Avoid ACEI, ARB and NSAIDS. Monitor input and output. Overall prognosis poor. Further recommendations pending clinical course. Thank you for the courtesy of this referral.
spoke to daughter in detail

## 2017-09-01 NOTE — GOALS OF CARE CONVERSATION - PERSONAL ADVANCE DIRECTIVE - CONVERSATION DETAILS
Spoke with dtr /HCP she atates he is DNR DNI ,has a living will and wants her to decide and to keep him comfortable.Consented to MAYRA

## 2017-09-01 NOTE — H&P ADULT - ASSESSMENT
88 male with    1. acute encephalopathy likely related to severe hypernatremia/dehydration: D5W or D5 1/2NS at about 75 cc/hr. check BMP q4-6 hrs. Avoid rapid correction of sodium. F/up renal recs - Dr. Jason. Monitor intake and output.    2. dementia with behavioral disturbance: risperdal prn. f/up neuro recs    3. BRAYDEN/ATN due to dehydration: renal dose meds and avoid nephrotoxics. monitor intake and output on IVF. f/up renal recs    4. leukocytosis: likely reactive to stress/dehydration. avoid further abx. patient seems to have completed 10 day course of augmentin for sinusitis.    5. poor dentition/aspiration risk: swallow eval. NPO for now    6. deconditioning: PT/Nutrition eval.    7. heparin SQ for dvt ppx    8. dispo: home with services when acute medical issues resolved.  will discuss with HCP further about outpatient providers, baseline labs, GOC etc when she arrives here later today.  plan of care d/w HCP/Palliative MD/RN.  Spent 75 mins

## 2017-09-01 NOTE — SWALLOW BEDSIDE ASSESSMENT ADULT - ASR SWALLOW ASPIRATION MONITOR
pneumonia/upper respiratory infection/position upright (90Y)/gurgly voice/fever/cough/oral hygiene/change of breathing pattern/throat clearing

## 2017-09-01 NOTE — H&P ADULT - NSHPPHYSICALEXAM_GEN_ALL_CORE
PHYSICAL EXAM:  GENERAL: elderly male, chronically-ill appearing, groans and moans intermittently.  HEAD:  Atraumatic, Normocephalic  EYES: conjunctiva and sclera clear  ENMT: dry mucous membranes, poor dentition  NECK: Supple, No Jugular venous distension  NERVOUS SYSTEM:  awake, follows simple commands - per RN, mental status improved since IVF started.  CHEST/LUNG: Clear to auscultation anteriorly; No rales, rhonchi, wheezing, or rubs  HEART: Regular rate and rhythm; No murmurs, rubs, or gallops  ABDOMEN: Soft, Nontender, Nondistended; Bowel sounds present  EXTREMITIES:  muscle wasting, chronic venous stasis skin changes, no edema. BUNIONS  LYMPH: No lymphadenopathy noted  SKIN: chronic venous stasis skin changes both legs below knees

## 2017-09-01 NOTE — ED PROVIDER NOTE - MEDICAL DECISION MAKING DETAILS
Wide differential dx including but not limited too ; early sepsis, CVA, decompensated dementia , severe dehydration. Labs/xr/cth/ekg/ivf bolus (no hx chf)/1xdose broad spectrum abx/ anticipate admission. Flu swab ordered due to likely admission and infection control measures as HHA indicated + flu as a recent dx.

## 2017-09-01 NOTE — CONSULT NOTE ADULT - SUBJECTIVE AND OBJECTIVE BOX
Patient is a 88y old  Male who presents with a chief complaint of lethargy/AMS (01 Sep 2017 11:05)    HPI:  88 male from home came in with AMS/lethargy.  Patient likely has dementia and is altered from baseline - unable to provide history.  Spoke to Valery - ?HCP/caretaker; she will stop by late morning to bring in papers such as advance directives, HCP etc.  Per ER RN/DO, patient able to ambulate with walker, eats soft vegan food. (01 Sep 2017 11:05)    Renal consult called for BRAYDEN, Hypernatremia. As per the health aid at bedside pt has been having very poor PO intake.       PAST MEDICAL HISTORY:  Dementia with behavioral disturbance  No pertinent past medical history      PAST SURGICAL HISTORY:  No significant past surgical history      FAMILY HISTORY:  No pertinent family history in first degree relatives      SOCIAL HISTORY: No smoking or alcohol use     Allergies    No Known Allergies    Intolerances    Home Medications:   * Patient Currently Takes Medications as of 01-Sep-2017 08:16 documented in Prescription Writer  · 	risperiDONE 0.25 mg oral tablet:  orally once a day (at bedtime), Last Dose Taken:    · 	Augmentin 875 mg-125 mg oral tablet: 1 tab(s) orally every 12 hours, Last Dose Taken:      REVIEW OF SYSTEMS:  unable to obtain    T(F): 97.8 (09-01-17 @ 11:22), Max: 97.8 (09-01-17 @ 11:22)  HR: 74 (09-01-17 @ 11:22) (74 - 110)  BP: 129/73 (09-01-17 @ 11:22) (94/56 - 148/83)  RR: 13 (09-01-17 @ 11:22) (13 - 21)  SpO2: 100% (09-01-17 @ 11:22) (98% - 100%)  Wt(kg): --    PHYSICAL EXAM:  General: NAD  Respiratory: b/l air entry  Cardiovascular: S1 S2  Gastrointestinal: soft  Extremities: no edema        09-01    164<HH>  |  123<H>  |  89<H>  ----------------------------<  100<H>  5.2   |  21<L>  |  3.93<H>    Ca    9.6      01 Sep 2017 08:53    TPro  8.4<H>  /  Alb  3.2<L>  /  TBili  0.5  /  DBili  x   /  AST  13  /  ALT  <10<L>  /  AlkPhos  80  09-01                          17.0   19.3  )-----------( 499      ( 01 Sep 2017 08:53 )             56.9       Hematocrit: 56.9 % (09-01 @ 08:53)  Hemoglobin: 17.0 g/dL (09-01 @ 08:53)  Blood Urea Nitrogen, Serum: 89 mg/dL (09-01 @ 08:53)  Potassium, Serum: 5.2 mmol/L (09-01 @ 08:53)      Creatinine, Serum: 3.93 (09-01 @ 08:53)      Urinalysis Basic - ( 01 Sep 2017 09:52 )    Color: x / Appearance: x / SG: x / pH: x  Gluc: x / Ketone: x  / Bili: x / Urobili: x   Blood: x / Protein: x / Nitrite: x   Leuk Esterase: x / RBC: 0-2 /HPF / WBC 3-5   Sq Epi: x / Non Sq Epi: Few / Bacteria: Few      LIVER FUNCTIONS - ( 01 Sep 2017 08:53 )  Alb: 3.2 g/dL / Pro: 8.4 g/dL / ALK PHOS: 80 U/L / ALT: <10 U/L DA / AST: 13 U/L / GGT: x           CARDIAC MARKERS ( 01 Sep 2017 08:53 )  .005 ng/mL / x     / x     / x     / x                    I&O's Detail

## 2017-09-01 NOTE — SWALLOW BEDSIDE ASSESSMENT ADULT - PHARYNGEAL PHASE
Cough post oral intake/Delayed pharyngeal swallow/Decreased laryngeal elevation Delayed pharyngeal swallow/Decreased laryngeal elevation

## 2017-09-01 NOTE — ED PROVIDER NOTE - OBJECTIVE STATEMENT
Ill appearing 88 y m, presents to ed with HHMYA, who states that he been treated by Dr. Kennedy with augmentin for the flu for the past 10 days, presenting with change in mental status , increased lethargy, decreased ability to ambulate x 48 hrs. The pt is too ill/aphasic to further and complaints, just saying "ow", regardless of external stimulus, verbal and physical

## 2017-09-02 DIAGNOSIS — F03.91 UNSPECIFIED DEMENTIA WITH BEHAVIORAL DISTURBANCE: ICD-10-CM

## 2017-09-02 DIAGNOSIS — D72.828 OTHER ELEVATED WHITE BLOOD CELL COUNT: ICD-10-CM

## 2017-09-02 LAB
ANION GAP SERPL CALC-SCNC: 12 MMOL/L — SIGNIFICANT CHANGE UP (ref 5–17)
ANION GAP SERPL CALC-SCNC: 12 MMOL/L — SIGNIFICANT CHANGE UP (ref 5–17)
BUN SERPL-MCNC: 66 MG/DL — HIGH (ref 7–23)
BUN SERPL-MCNC: 69 MG/DL — HIGH (ref 7–23)
CALCIUM SERPL-MCNC: 7.8 MG/DL — LOW (ref 8.4–10.5)
CALCIUM SERPL-MCNC: 8.1 MG/DL — LOW (ref 8.4–10.5)
CHLORIDE SERPL-SCNC: 126 MMOL/L — HIGH (ref 96–108)
CHLORIDE SERPL-SCNC: 126 MMOL/L — HIGH (ref 96–108)
CO2 SERPL-SCNC: 21 MMOL/L — LOW (ref 22–31)
CO2 SERPL-SCNC: 21 MMOL/L — LOW (ref 22–31)
CREAT SERPL-MCNC: 3.11 MG/DL — HIGH (ref 0.5–1.3)
CREAT SERPL-MCNC: 3.22 MG/DL — HIGH (ref 0.5–1.3)
CULTURE RESULTS: NO GROWTH — SIGNIFICANT CHANGE UP
GLUCOSE SERPL-MCNC: 124 MG/DL — HIGH (ref 70–99)
GLUCOSE SERPL-MCNC: 140 MG/DL — HIGH (ref 70–99)
HCT VFR BLD CALC: 41.5 % — SIGNIFICANT CHANGE UP (ref 39–50)
HGB BLD-MCNC: 13.2 G/DL — SIGNIFICANT CHANGE UP (ref 13–17)
MCHC RBC-ENTMCNC: 30.8 PG — SIGNIFICANT CHANGE UP (ref 27–34)
MCHC RBC-ENTMCNC: 31.7 GM/DL — LOW (ref 32–36)
MCV RBC AUTO: 97.2 FL — SIGNIFICANT CHANGE UP (ref 80–100)
PLATELET # BLD AUTO: 331 K/UL — SIGNIFICANT CHANGE UP (ref 150–400)
POTASSIUM SERPL-MCNC: 3.9 MMOL/L — SIGNIFICANT CHANGE UP (ref 3.5–5.3)
POTASSIUM SERPL-MCNC: 4.1 MMOL/L — SIGNIFICANT CHANGE UP (ref 3.5–5.3)
POTASSIUM SERPL-SCNC: 3.9 MMOL/L — SIGNIFICANT CHANGE UP (ref 3.5–5.3)
POTASSIUM SERPL-SCNC: 4.1 MMOL/L — SIGNIFICANT CHANGE UP (ref 3.5–5.3)
PREALB SERPL-MCNC: 11 MG/DL — LOW (ref 18–45)
PROCALCITONIN SERPL-MCNC: 0.28 NG/ML — HIGH (ref 0–0.04)
RBC # BLD: 4.27 M/UL — SIGNIFICANT CHANGE UP (ref 4.2–5.8)
RBC # FLD: 15.9 % — HIGH (ref 10.3–14.5)
SODIUM SERPL-SCNC: 159 MMOL/L — HIGH (ref 135–145)
SODIUM SERPL-SCNC: 159 MMOL/L — HIGH (ref 135–145)
SPECIMEN SOURCE: SIGNIFICANT CHANGE UP
URATE SERPL-MCNC: 14.5 MG/DL — HIGH (ref 3.4–8.8)
WBC # BLD: 16.2 K/UL — HIGH (ref 3.8–10.5)
WBC # FLD AUTO: 16.2 K/UL — HIGH (ref 3.8–10.5)
ZINC SERPL-MCNC: 57 UG/DL — SIGNIFICANT CHANGE UP (ref 56–134)

## 2017-09-02 PROCEDURE — 99233 SBSQ HOSP IP/OBS HIGH 50: CPT

## 2017-09-02 RX ORDER — ACETAMINOPHEN 500 MG
650 TABLET ORAL EVERY 6 HOURS
Qty: 0 | Refills: 0 | Status: DISCONTINUED | OUTPATIENT
Start: 2017-09-02 | End: 2017-09-11

## 2017-09-02 RX ADMIN — Medication 650 MILLIGRAM(S): at 22:59

## 2017-09-02 RX ADMIN — SODIUM CHLORIDE 100 MILLILITER(S): 9 INJECTION, SOLUTION INTRAVENOUS at 09:01

## 2017-09-02 RX ADMIN — HEPARIN SODIUM 5000 UNIT(S): 5000 INJECTION INTRAVENOUS; SUBCUTANEOUS at 22:00

## 2017-09-02 RX ADMIN — RISPERIDONE 0.25 MILLIGRAM(S): 4 TABLET ORAL at 22:37

## 2017-09-02 RX ADMIN — HEPARIN SODIUM 5000 UNIT(S): 5000 INJECTION INTRAVENOUS; SUBCUTANEOUS at 09:00

## 2017-09-02 NOTE — PROGRESS NOTE ADULT - SUBJECTIVE AND OBJECTIVE BOX
Patient is a 88y old  Male who presents with a chief complaint of lethargy/AMS (01 Sep 2017 11:05)      INTERVAL HPI/OVERNIGHT EVENTS:  Pt is seen and examined.  confused.    Pain Location & Control:     MEDICATIONS  (STANDING):  heparin  Injectable 5000 Unit(s) SubCutaneous every 12 hours  risperiDONE   Tablet 0.25 milliGRAM(s) Oral at bedtime  dextrose 5%. 1000 milliLiter(s) (100 mL/Hr) IV Continuous <Continuous>    MEDICATIONS  (PRN):  acetaminophen    Suspension. 650 milliGRAM(s) Oral every 8 hours PRN Moderate Pain (4 - 6)      Allergies    No Known Allergies    Intolerances            Vital Signs Last 24 Hrs  T(C): 36.4 (02 Sep 2017 08:03), Max: 36.7 (02 Sep 2017 04:00)  T(F): 97.6 (02 Sep 2017 08:03), Max: 98.1 (02 Sep 2017 04:00)  HR: 73 (02 Sep 2017 12:00) (67 - 100)  BP: 122/67 (02 Sep 2017 12:00) (104/49 - 141/74)  BP(mean): 85 (02 Sep 2017 12:00) (65 - 103)  RR: 16 (02 Sep 2017 12:00) (14 - 24)  SpO2: 99% (02 Sep 2017 12:00) (93% - 100%)        LABS:                        13.2   16.2  )-----------( 331      ( 02 Sep 2017 07:04 )             41.5     02 Sep 2017 07:00    159    |  126    |  66     ----------------------------<  124    4.1     |  21     |  3.11     Ca    7.8        02 Sep 2017 07:00      PT/INR - ( 01 Sep 2017 08:53 )   PT: 12.3 sec;   INR: 1.13 ratio         PTT - ( 01 Sep 2017 08:53 )  PTT:30.3 sec  Urinalysis Basic - ( 01 Sep 2017 09:52 )    Color: x / Appearance: x / SG: x / pH: x  Gluc: x / Ketone: x  / Bili: x / Urobili: x   Blood: x / Protein: x / Nitrite: x   Leuk Esterase: x / RBC: 0-2 /HPF / WBC 3-5   Sq Epi: x / Non Sq Epi: Few / Bacteria: Few          CARDIAC MARKERS ( 01 Sep 2017 08:53 )  .005 ng/mL / x     / x     / x     / x          Cultures      RADIOLOGY & ADDITIONAL TESTS:    Imaging Personally Reviewed:  [ ] YES  [ ] NO    Consultant(s) Notes Reviewed:  [x ] YES  [ ] NO    Care Discussed with Consultants/Other Providers [ x] YES  [ ] NO

## 2017-09-02 NOTE — PROGRESS NOTE ADULT - SUBJECTIVE AND OBJECTIVE BOX
Date/Time Patient Seen:  		  Referring MD:   Data Reviewed	       Patient is a 88y old  Male who presents with a chief complaint of lethargy/AMS (01 Sep 2017 11:05)  in bed  seen and examined  vs and meds reviewed  on IVF      Subjective/HPI     PAST MEDICAL & SURGICAL HISTORY:  Dementia with behavioral disturbance  No pertinent past medical history  No significant past surgical history        Medication list         MEDICATIONS  (STANDING):  heparin  Injectable 5000 Unit(s) SubCutaneous every 12 hours  risperiDONE   Tablet 0.25 milliGRAM(s) Oral at bedtime  dextrose 5%. 1000 milliLiter(s) (100 mL/Hr) IV Continuous <Continuous>    MEDICATIONS  (PRN):  acetaminophen    Suspension. 650 milliGRAM(s) Oral every 8 hours PRN Moderate Pain (4 - 6)         Vitals log        ICU Vital Signs Last 24 Hrs  T(C): 36.7 (02 Sep 2017 04:00), Max: 36.7 (02 Sep 2017 04:00)  T(F): 98.1 (02 Sep 2017 04:00), Max: 98.1 (02 Sep 2017 04:00)  HR: 100 (02 Sep 2017 08:00) (67 - 100)  BP: 134/93 (02 Sep 2017 08:00) (94/56 - 141/74)  BP(mean): 103 (02 Sep 2017 08:00) (65 - 103)  ABP: --  ABP(mean): --  RR: 24 (02 Sep 2017 08:00) (13 - 24)  SpO2: 99% (02 Sep 2017 08:00) (93% - 100%)           Input and Output:  I&O's Detail    01 Sep 2017 07:01  -  02 Sep 2017 07:00  --------------------------------------------------------  IN:    dextrose 5% + sodium chloride 0.45%.: 300 mL    dextrose 5%.: 900 mL    Oral Fluid: 100 mL  Total IN: 1300 mL    OUT:  Total OUT: 0 mL    Total NET: 1300 mL      02 Sep 2017 07:01  -  02 Sep 2017 08:27  --------------------------------------------------------  IN:    dextrose 5%.: 200 mL  Total IN: 200 mL    OUT:  Total OUT: 0 mL    Total NET: 200 mL          Lab Data                        13.2   16.2  )-----------( 331      ( 02 Sep 2017 07:04 )             41.5     09-02    159<H>  |  126<H>  |  69<H>  ----------------------------<  140<H>  3.9   |  21<L>  |  3.22<H>    Ca    8.1<L>      02 Sep 2017 00:42    TPro  8.4<H>  /  Alb  3.2<L>  /  TBili  0.5  /  DBili  x   /  AST  13  /  ALT  <10<L>  /  AlkPhos  80  09-01      CARDIAC MARKERS ( 01 Sep 2017 08:53 )  .005 ng/mL / x     / x     / x     / x            Review of Systems	      Objective     Physical Examination  head at  heart - s1s2  lungs - dec BS  abd - soft  weak  confused        Pertinent Lab findings & Imaging      Renea:  NO   Adequate UO     I&O's Detail    01 Sep 2017 07:01  -  02 Sep 2017 07:00  --------------------------------------------------------  IN:    dextrose 5% + sodium chloride 0.45%.: 300 mL    dextrose 5%.: 900 mL    Oral Fluid: 100 mL  Total IN: 1300 mL    OUT:  Total OUT: 0 mL    Total NET: 1300 mL      02 Sep 2017 07:01  -  02 Sep 2017 08:27  --------------------------------------------------------  IN:    dextrose 5%.: 200 mL  Total IN: 200 mL    OUT:  Total OUT: 0 mL    Total NET: 200 mL               Discussed with:     Cultures:	        Radiology

## 2017-09-02 NOTE — PROGRESS NOTE ADULT - ASSESSMENT
88 male with    1. acute encephalopathy likely related to severe hypernatremia/dehydration: D5W or D5 1/2NS at about 75 cc/hr. check BMP q4-6 hrs. Avoid rapid correction of sodium. F/up renal recs - Dr. Jason. Monitor intake and output.    2. dementia with behavioral disturbance: risperdal prn. f/up neuro recs    3. BRAYDEN/ATN due to dehydration: renal dose meds and avoid nephrotoxics. monitor intake and output on IVF. f/up renal recs    4. leukocytosis: likely reactive to stress/dehydration. avoid further abx. patient seems to have completed 10 day course of augmentin for sinusitis.    5. poor dentition/aspiration risk: swallow eval. NPO for now    6. deconditioning: PT/Nutrition eval.    7. heparin SQ for dvt ppx    8. dispo: home with services when acute medical issues resolved.  poor prognosis.  DNR.

## 2017-09-02 NOTE — DIETITIAN INITIAL EVALUATION ADULT. - OTHER INFO
Pt alert, confused, admitted with encephalopathy.  Pt with xerostomia, oral care attempted h/e pt became agitated.  P also became agitated during PO trials. Pt alert, confused, admitted with encephalopathy.  Pt with xerostomia, oral care attempted h/e pt became agitated.  P also became agitated during PO trials. presents with oropharyngeal dysphagia characterized by reduced orientation, reduced oral grading, labored manipulation of the bolus, latent AP transport, swallow delay. Over s/s of aspiration noted for thin liquids.  Pt at increased risk of aspiration secondary to cognitive deficits. he is safely tolerating dysphagia 1 puree with nectar thickened liquids. Pt alert, confused, admitted with encephalopathy and ARF. Seen by SLP: presents with oropharyngeal dysphagia characterized by reduced orientation, swallow delay. Over s/s of aspiration noted for thin liquids. Pt at increased risk of aspiration secondary to cognitive deficits. Pt is safely tolerating dysphagia 1 puree with nectar thickened liquids. Poor po intake with assistance and encouragement.  Pt would benefit from nutritional supplement. Admitted with stage I pressure ulcer L and R heel. Pt is at risk for further skin breakdown. Prealb indicative of severe depletion. Pt lives in a home, has a 24 hr HHA/7 days. BRAYDEN due to dehydration. CT head: no acute intracranial pathology. NKA to food. PMH as below:

## 2017-09-02 NOTE — PROGRESS NOTE ADULT - PROBLEM SELECTOR PLAN 1
BRAYDEN  CKD  on IVF  I and O  monitor lytes and cr  replete lytes as needed  dehydration noted  caution with large volumes of IVF

## 2017-09-02 NOTE — PROGRESS NOTE ADULT - SUBJECTIVE AND OBJECTIVE BOX
Neurology follow up note    AZUL VILLA88yMale      Interval History:    Patient resting in bed     MEDICATIONS    heparin  Injectable 5000 Unit(s) SubCutaneous every 12 hours  risperiDONE   Tablet 0.25 milliGRAM(s) Oral at bedtime  acetaminophen    Suspension. 650 milliGRAM(s) Oral every 8 hours PRN  dextrose 5%. 1000 milliLiter(s) IV Continuous <Continuous>      Allergies    No Known Allergies    Intolerances        Height (cm): 175.26 (09-01 @ 11:22)  Weight (kg): 72.6 (09-01 @ 11:22)  BMI (kg/m2): 23.6 (09-01 @ 11:22)    Vital Signs Last 24 Hrs  T(C): 36.7 (02 Sep 2017 04:00), Max: 36.7 (02 Sep 2017 04:00)  T(F): 98.1 (02 Sep 2017 04:00), Max: 98.1 (02 Sep 2017 04:00)  HR: 100 (02 Sep 2017 08:00) (67 - 100)  BP: 134/93 (02 Sep 2017 08:00) (94/56 - 141/74)  BP(mean): 103 (02 Sep 2017 08:00) (65 - 103)  RR: 24 (02 Sep 2017 08:00) (13 - 24)  SpO2: 99% (02 Sep 2017 08:00) (93% - 100%)      REVIEW OF SYSTEMS: Non Verbal     On Neurological Examination:    Mental Status - Patient is awake       Does not follow commands    Speech -   says 1 to 2                        Cranial Nerves - Pupils 3 mm equal and reactive to light,   extraocular eye movements intact.   smile symmetric  intact bilateral NLF    Motor Exam -     With stimuli positive movement of all 4 extremities    Muscle tone - is normal all over.  No asymmetry is seen.      Sensory    Bilateral intact to light touch    GENERAL Exam: Nontoxic , No Acute Distress   	  HEENT:  normocephalic, atraumatic  		  LUNGS:   Decreased bilaterally  	  HEART: Normal S1S2   No murmur RRR        	  GI/ ABDOMEN:  Soft  Non tender    EXTREMITIES:   No Edema  No Clubbing  No Cyanosis No Edema    MUSCULOSKELETAL:  decreased Range of Motion all 4 extremities   	   SKIN: Normal  No Ecchymosis               LABS:  CBC Full  -  ( 02 Sep 2017 07:04 )  WBC Count : 16.2 K/uL  Hemoglobin : 13.2 g/dL  Hematocrit : 41.5 %  Platelet Count - Automated : 331 K/uL  Mean Cell Volume : 97.2 fl  Mean Cell Hemoglobin : 30.8 pg  Mean Cell Hemoglobin Concentration : 31.7 gm/dL  Auto Neutrophil # : x  Auto Lymphocyte # : x  Auto Monocyte # : x  Auto Eosinophil # : x  Auto Basophil # : x  Auto Neutrophil % : x  Auto Lymphocyte % : x  Auto Monocyte % : x  Auto Eosinophil % : x  Auto Basophil % : x    Urinalysis Basic - ( 01 Sep 2017 09:52 )    Color: x / Appearance: x / SG: x / pH: x  Gluc: x / Ketone: x  / Bili: x / Urobili: x   Blood: x / Protein: x / Nitrite: x   Leuk Esterase: x / RBC: 0-2 /HPF / WBC 3-5   Sq Epi: x / Non Sq Epi: Few / Bacteria: Few      09-02    159<H>  |  126<H>  |  66<H>  ----------------------------<  124<H>  4.1   |  21<L>  |  3.11<H>    Ca    7.8<L>      02 Sep 2017 07:00    TPro  8.4<H>  /  Alb  3.2<L>  /  TBili  0.5  /  DBili  x   /  AST  13  /  ALT  <10<L>  /  AlkPhos  80  09-01    Hemoglobin A1C:     LIVER FUNCTIONS - ( 01 Sep 2017 08:53 )  Alb: 3.2 g/dL / Pro: 8.4 g/dL / ALK PHOS: 80 U/L / ALT: <10 U/L DA / AST: 13 U/L / GGT: x           Vitamin B12 Vitamin B12, Serum: 770 pg/mL (09-01 @ 14:54)    PT/INR - ( 01 Sep 2017 08:53 )   PT: 12.3 sec;   INR: 1.13 ratio         PTT - ( 01 Sep 2017 08:53 )  PTT:30.3 sec      RADIOLOGY    ANALYSIS AND ZACK:  An 88-year-old with episode of changes in mental status and history of dementia.  1.	For changes in mental status, most likely toxic metabolic encephalopathy, which is multifactorial, possibly secondary to underlying viral-type syndrome along with hypernatremia and acute kidney injury.  2.	Recommend IV fluid hydration and monitor renal function.  3.	Recommend antibiotics as needed.  4.	Monitor patient's electrolytes.  5.	appear more awake today   6.	speech and swallow   7.	For history of dementia, for now we will recommend supportive therapy.  I spoke with daughter Valery at [phone] 9/2/17.  She is aware that in the hospital setting, patient may become more disoriented.  He does have a history of sundowning and if necessary she will come stay with him.  We will continue to follow.      30 minutes spent on total encounter; more than 50% of the visit was spent counseling and/or coordinating care by the attending physician.

## 2017-09-02 NOTE — PROGRESS NOTE ADULT - SUBJECTIVE AND OBJECTIVE BOX
Patient is a 88y old  Male who presents with a chief complaint of lethargy/AMS (01 Sep 2017 11:05)      Patient seen in follow up for BRAYDEN, Hypoernatremia. Improving sodium levels.     PAST MEDICAL HISTORY:  Dementia with behavioral disturbance  No pertinent past medical history    MEDICATIONS  (STANDING):  heparin  Injectable 5000 Unit(s) SubCutaneous every 12 hours  risperiDONE   Tablet 0.25 milliGRAM(s) Oral at bedtime  dextrose 5%. 1000 milliLiter(s) (100 mL/Hr) IV Continuous <Continuous>    MEDICATIONS  (PRN):  acetaminophen    Suspension. 650 milliGRAM(s) Oral every 8 hours PRN Moderate Pain (4 - 6)    T(C): 36.7 (09-02-17 @ 04:00), Max: 36.7 (09-02-17 @ 04:00)  HR: 100 (09-02-17 @ 08:00) (67 - 110)  BP: 134/93 (09-02-17 @ 08:00) (94/56 - 148/83)  RR: 24 (09-02-17 @ 08:00) (13 - 24)  SpO2: 99% (09-02-17 @ 08:00) (93% - 100%)  Wt(kg): --  I&O's Detail    01 Sep 2017 07:01  -  02 Sep 2017 07:00  --------------------------------------------------------  IN:    dextrose 5% + sodium chloride 0.45%.: 300 mL    dextrose 5%.: 900 mL    Oral Fluid: 100 mL  Total IN: 1300 mL    OUT:  Total OUT: 0 mL    Total NET: 1300 mL      02 Sep 2017 07:01  -  02 Sep 2017 09:30  --------------------------------------------------------  IN:    dextrose 5%.: 300 mL  Total IN: 300 mL    OUT:  Total OUT: 0 mL    Total NET: 300 mL          PHYSICAL EXAM:  General: NAD  Respiratory: b/l air entry  Cardiovascular: S1 S2  Gastrointestinal: soft  Extremities:  no edema                          13.2   16.2  )-----------( 331      ( 02 Sep 2017 07:04 )             41.5     09-02    159<H>  |  126<H>  |  66<H>  ----------------------------<  124<H>  4.1   |  21<L>  |  3.11<H>    Ca    7.8<L>      02 Sep 2017 07:00    TPro  8.4<H>  /  Alb  3.2<L>  /  TBili  0.5  /  DBili  x   /  AST  13  /  ALT  <10<L>  /  AlkPhos  80  09-01    CARDIAC MARKERS ( 01 Sep 2017 08:53 )  .005 ng/mL / x     / x     / x     / x          LIVER FUNCTIONS - ( 01 Sep 2017 08:53 )  Alb: 3.2 g/dL / Pro: 8.4 g/dL / ALK PHOS: 80 U/L / ALT: <10 U/L DA / AST: 13 U/L / GGT: x           Urinalysis Basic - ( 01 Sep 2017 09:52 )    Color: x / Appearance: x / SG: x / pH: x  Gluc: x / Ketone: x  / Bili: x / Urobili: x   Blood: x / Protein: x / Nitrite: x   Leuk Esterase: x / RBC: 0-2 /HPF / WBC 3-5   Sq Epi: x / Non Sq Epi: Few / Bacteria: Few        Sodium, Serum: 159 (09-02 @ 07:00)  Sodium, Serum: 159 (09-02 @ 00:42)  Sodium, Serum: 161 (09-01 @ 21:50)  Sodium, Serum: 160 (09-01 @ 17:17)    Creatinine, Serum: 3.11 (09-02 @ 07:00)  Creatinine, Serum: 3.22 (09-02 @ 00:42)  Creatinine, Serum: 3.39 (09-01 @ 21:50)  Creatinine, Serum: 3.46 (09-01 @ 17:17)  Creatinine, Serum: 3.93 (09-01 @ 08:53)    Potassium, Serum: 4.1 (09-02 @ 07:00)  Potassium, Serum: 3.9 (09-02 @ 00:42)  Potassium, Serum: 4.2 (09-01 @ 21:50)  Potassium, Serum: 4.9 (09-01 @ 17:17)    Hemoglobin: 13.2 (09-02 @ 07:04)  Hemoglobin: 14.2 (09-01 @ 17:17)  Hemoglobin: 17.0 (09-01 @ 08:53)    < from: Xray Chest 1 View AP- PORTABLE-Urgent (09.01.17 @ 09:14) >    EXAM:  CHEST-PORTABLE URGENT                                  PROCEDURE DATE:  09/01/2017          INTERPRETATION:  Clinical information: Altered mental status    No prior studies present for comparison  Portable study, 9:06 AM  Cardiac monitor leads present. Elevated diaphragm, shallow inspiration.   The aorta shows atherosclerotic changes. The lungs are clear. There is no   sign of infiltrate effusion or congestive failure. Heart size is within   normal limits. Hilar regions and mediastinal contours are unremarkable.    Old left rib fracture present.    IMPRESSION:    No active disease.    < end of copied text >

## 2017-09-02 NOTE — PROGRESS NOTE ADULT - ASSESSMENT
·	Hypernatremia: Secondary to decreased free water intake  ·	BRAYDEN: Prerenal azotemia, R/o retention    Continue IVF D5W. Improving renal function. Will follow electrolytes and renal function trend. Encourage PO intake as tolerated. Labs as ordered. Avoid nephrotoxic meds as possible. Avoid ACEI, ARB and NSAIDS. Monitor input and output.

## 2017-09-03 LAB
ANION GAP SERPL CALC-SCNC: 11 MMOL/L — SIGNIFICANT CHANGE UP (ref 5–17)
APPEARANCE UR: ABNORMAL
BACTERIA # UR AUTO: ABNORMAL
BILIRUB UR-MCNC: NEGATIVE — SIGNIFICANT CHANGE UP
BUN SERPL-MCNC: 46 MG/DL — HIGH (ref 7–23)
CALCIUM SERPL-MCNC: 8.3 MG/DL — LOW (ref 8.4–10.5)
CHLORIDE SERPL-SCNC: 114 MMOL/L — HIGH (ref 96–108)
CO2 SERPL-SCNC: 23 MMOL/L — SIGNIFICANT CHANGE UP (ref 22–31)
COLOR SPEC: YELLOW — SIGNIFICANT CHANGE UP
CREAT SERPL-MCNC: 2.5 MG/DL — HIGH (ref 0.5–1.3)
DIFF PNL FLD: ABNORMAL
EPI CELLS # UR: ABNORMAL
GLUCOSE SERPL-MCNC: 116 MG/DL — HIGH (ref 70–99)
GLUCOSE UR QL: NEGATIVE MG/DL — SIGNIFICANT CHANGE UP
GRAN CASTS # UR COMP ASSIST: ABNORMAL /LPF
HCT VFR BLD CALC: 43.6 % — SIGNIFICANT CHANGE UP (ref 39–50)
HGB BLD-MCNC: 13.6 G/DL — SIGNIFICANT CHANGE UP (ref 13–17)
KETONES UR-MCNC: NEGATIVE — SIGNIFICANT CHANGE UP
LACTATE SERPL-SCNC: 2.2 MMOL/L — HIGH (ref 0.7–2)
LEUKOCYTE ESTERASE UR-ACNC: ABNORMAL
MCHC RBC-ENTMCNC: 30.3 PG — SIGNIFICANT CHANGE UP (ref 27–34)
MCHC RBC-ENTMCNC: 31.2 GM/DL — LOW (ref 32–36)
MCV RBC AUTO: 97.1 FL — SIGNIFICANT CHANGE UP (ref 80–100)
NITRITE UR-MCNC: NEGATIVE — SIGNIFICANT CHANGE UP
PH UR: 5 — SIGNIFICANT CHANGE UP (ref 5–8)
PLATELET # BLD AUTO: 605 K/UL — HIGH (ref 150–400)
POTASSIUM SERPL-MCNC: 3.8 MMOL/L — SIGNIFICANT CHANGE UP (ref 3.5–5.3)
POTASSIUM SERPL-SCNC: 3.8 MMOL/L — SIGNIFICANT CHANGE UP (ref 3.5–5.3)
PROT UR-MCNC: 30 MG/DL
RBC # BLD: 4.49 M/UL — SIGNIFICANT CHANGE UP (ref 4.2–5.8)
RBC # FLD: 15.6 % — HIGH (ref 10.3–14.5)
RBC CASTS # UR COMP ASSIST: ABNORMAL /HPF (ref 0–4)
SODIUM SERPL-SCNC: 148 MMOL/L — HIGH (ref 135–145)
SP GR SPEC: 1.02 — SIGNIFICANT CHANGE UP (ref 1.01–1.02)
URATE CRY FLD QL MICRO: ABNORMAL
UROBILINOGEN FLD QL: NEGATIVE MG/DL — SIGNIFICANT CHANGE UP
WBC # BLD: 12.6 K/UL — HIGH (ref 3.8–10.5)
WBC # FLD AUTO: 12.6 K/UL — HIGH (ref 3.8–10.5)
WBC UR QL: SIGNIFICANT CHANGE UP

## 2017-09-03 PROCEDURE — 99233 SBSQ HOSP IP/OBS HIGH 50: CPT

## 2017-09-03 PROCEDURE — 71010: CPT | Mod: 26

## 2017-09-03 RX ORDER — SODIUM CHLORIDE 9 MG/ML
1000 INJECTION INTRAMUSCULAR; INTRAVENOUS; SUBCUTANEOUS ONCE
Qty: 0 | Refills: 0 | Status: COMPLETED | OUTPATIENT
Start: 2017-09-03 | End: 2017-09-03

## 2017-09-03 RX ORDER — PIPERACILLIN AND TAZOBACTAM 4; .5 G/20ML; G/20ML
3.38 INJECTION, POWDER, LYOPHILIZED, FOR SOLUTION INTRAVENOUS EVERY 12 HOURS
Qty: 0 | Refills: 0 | Status: COMPLETED | OUTPATIENT
Start: 2017-09-03 | End: 2017-09-09

## 2017-09-03 RX ORDER — PIPERACILLIN AND TAZOBACTAM 4; .5 G/20ML; G/20ML
3.38 INJECTION, POWDER, LYOPHILIZED, FOR SOLUTION INTRAVENOUS ONCE
Qty: 0 | Refills: 0 | Status: COMPLETED | OUTPATIENT
Start: 2017-09-03 | End: 2017-09-03

## 2017-09-03 RX ADMIN — PIPERACILLIN AND TAZOBACTAM 200 GRAM(S): 4; .5 INJECTION, POWDER, LYOPHILIZED, FOR SOLUTION INTRAVENOUS at 20:00

## 2017-09-03 RX ADMIN — Medication 650 MILLIGRAM(S): at 20:04

## 2017-09-03 RX ADMIN — SODIUM CHLORIDE 1000 MILLILITER(S): 9 INJECTION INTRAMUSCULAR; INTRAVENOUS; SUBCUTANEOUS at 23:12

## 2017-09-03 RX ADMIN — SODIUM CHLORIDE 100 MILLILITER(S): 9 INJECTION, SOLUTION INTRAVENOUS at 07:29

## 2017-09-03 RX ADMIN — HEPARIN SODIUM 5000 UNIT(S): 5000 INJECTION INTRAVENOUS; SUBCUTANEOUS at 10:10

## 2017-09-03 RX ADMIN — SODIUM CHLORIDE 75 MILLILITER(S): 9 INJECTION, SOLUTION INTRAVENOUS at 10:48

## 2017-09-03 RX ADMIN — RISPERIDONE 0.25 MILLIGRAM(S): 4 TABLET ORAL at 21:06

## 2017-09-03 RX ADMIN — SODIUM CHLORIDE 1000 MILLILITER(S): 9 INJECTION INTRAMUSCULAR; INTRAVENOUS; SUBCUTANEOUS at 22:02

## 2017-09-03 RX ADMIN — HEPARIN SODIUM 5000 UNIT(S): 5000 INJECTION INTRAVENOUS; SUBCUTANEOUS at 21:06

## 2017-09-03 NOTE — PROGRESS NOTE ADULT - SUBJECTIVE AND OBJECTIVE BOX
Patient is a 88y old  Male who presents with a chief complaint of lethargy/AMS (01 Sep 2017 11:05)      INTERVAL HPI/OVERNIGHT EVENTS:  Pt is seen and examined.  sleeping comfortably.    Pain Location & Control:     MEDICATIONS  (STANDING):  heparin  Injectable 5000 Unit(s) SubCutaneous every 12 hours  risperiDONE   Tablet 0.25 milliGRAM(s) Oral at bedtime  dextrose 5%. 1000 milliLiter(s) (75 mL/Hr) IV Continuous <Continuous>    MEDICATIONS  (PRN):  acetaminophen    Suspension. 650 milliGRAM(s) Oral every 8 hours PRN Moderate Pain (4 - 6)  acetaminophen  Suppository 650 milliGRAM(s) Rectal every 6 hours PRN For Temp greater than 38 C (100.4 F)      Allergies    No Known Allergies    Intolerances            Vital Signs Last 24 Hrs  T(C): 36.9 (03 Sep 2017 08:11), Max: 38.2 (02 Sep 2017 23:17)  T(F): 98.4 (03 Sep 2017 08:11), Max: 100.8 (02 Sep 2017 23:17)  HR: 67 (03 Sep 2017 10:00) (64 - 105)  BP: 117/62 (03 Sep 2017 10:00) (90/55 - 138/76)  BP(mean): 78 (03 Sep 2017 10:00) (66 - 88)  RR: 15 (03 Sep 2017 10:00) (12 - 26)  SpO2: 99% (03 Sep 2017 10:00) (89% - 100%)        LABS:                        13.6   12.6  )-----------( 605      ( 03 Sep 2017 06:36 )             43.6     03 Sep 2017 06:36    148    |  114    |  46     ----------------------------<  116    3.8     |  23     |  2.50     Ca    8.3        03 Sep 2017 06:36          CAPILLARY BLOOD GLUCOSE  91 (03 Sep 2017 07:22)  112 (02 Sep 2017 23:36)            Cultures      RADIOLOGY & ADDITIONAL TESTS:    Imaging Personally Reviewed:  [ ] YES  [ ] NO    Consultant(s) Notes Reviewed:  [x ] YES  [ ] NO    Care Discussed with Consultants/Other Providers [x ] YES  [ ] NO

## 2017-09-03 NOTE — PROGRESS NOTE ADULT - PROBLEM SELECTOR PLAN 1
supportive care  transfer to [facility]  oral hygiene  ADL assist  fall prec  skin care  am labs pending  dysph diet

## 2017-09-03 NOTE — PROGRESS NOTE ADULT - SUBJECTIVE AND OBJECTIVE BOX
AZUL VILLA is a 88yMale , patient examined and chart reviewed.    INTERVAL HPI/ OVERNIGHT EVENTS:  Events noted. Developed macula rash on ant post trunk   legs and head. Febrile to 102.1. Lethargic and confused.    Past Medical History--  PAST MEDICAL & SURGICAL HISTORY:  Dementia with behavioral disturbance  No significant past surgical history      For details regarding the patient's social history, family history, and other miscellaneous elements, please refer the initial infectious diseases consultation and/or the admitting history and physical examination for this admission.      ROS:  Unable to obtain due to : Confusion      Current inpatient medications :    ANTIBIOTICS/RELEVANT:  piperacillin/tazobactam IVPB. 3.375 Gram(s) IV Intermittent once  piperacillin/tazobactam IVPB. 3.375 Gram(s) IV Intermittent every 12 hours      heparin  Injectable 5000 Unit(s) SubCutaneous every 12 hours  risperiDONE   Tablet 0.25 milliGRAM(s) Oral at bedtime  acetaminophen    Suspension. 650 milliGRAM(s) Oral every 8 hours PRN  dextrose 5%. 1000 milliLiter(s) IV Continuous <Continuous>  acetaminophen  Suppository 650 milliGRAM(s) Rectal every 6 hours PRN      Objective:    09-02 @ 07:01  -  09-03 @ 07:00  --------------------------------------------------------  IN: 2400 mL / OUT: 0 mL / NET: 2400 mL    09-03 @ 07:01  -  09-03 @ 20:07  --------------------------------------------------------  IN: 975 mL / OUT: 0 mL / NET: 975 mL      T(C): 38.9 (09-03-17 @ 19:30), Max: 38.9 (09-03-17 @ 19:30)  HR: 121 (09-03-17 @ 19:30) (60 - 121)  BP: 114/62 (09-03-17 @ 16:00) (90/55 - 117/62)  RR: 20 (09-03-17 @ 19:30) (11 - 20)  SpO2: 96% (09-03-17 @ 19:30) (89% - 100%)  Wt(kg): --      Physical Exam:  GEN: Lethargic confused  HEENT: normocephalic and atraumatic. EOMI. JOSE ALFREDO. Moist mucosa. Clear Posterior pharynx.  NECK: Supple. No carotid bruits.  No lymphadenopathy or thyromegaly.  LUNGS: Rhonchi to auscultation.  HEART: Regular rate and rhythm without murmur.  ABDOMEN: Soft, nontender, and nondistended.  Positive bowel sounds.  No hepatosplenomegaly was noted.  EXTREMITIES: Without any cyanosis, clubbing, rash, lesions or edema.  NEUROLOGIC: A & O x3, No focal neurological deficits   SKIN: + rash ante post trunk thighs head      LABS:                        13.6   12.6  )-----------( 605      ( 03 Sep 2017 06:36 )             43.6       09-03    148<H>  |  114<H>  |  46<H>  ----------------------------<  116<H>  3.8   |  23  |  2.50<H>    Ca    8.3<L>      03 Sep 2017 06:36        RECENT CULTURES:    Culture - Blood (09.01.17 @ 16:48)    Specimen Source: .Blood Blood    Culture Results:   No growth to date.    Rapid Respiratory Viral Panel (09.01.17 @ 09:52)    Entero/Rhinovirus (RapRVP): Detected    Rapid RVP Result: Detected: The [employer] RVP Rapid uses polymerase chain reaction (PCR) and melt  curve analysis to screen for adenovirus; coronavirus HKU1, NL63, 229E,  OC43; human metapneumovirus (hMPV); human enterovirus/rhinovirus  (Entero/RV); influenza A; influenza A/H1;Detected: influenza A/H3; influenza  A/H1-2009; influenza B; parainfluenza viruses 1, 2, 3, 4; respiratory  syncytial virus; Bordetella pertussis; Mycoplasma pneumoniae; and  Chlamydophila pneumoniae.          RADIOLOGY & ADDITIONAL STUDIES:  EXAM:  CHEST-PORTABLE URGENT                                  PROCEDURE DATE:  09/01/2017          INTERPRETATION:  Clinical information: Altered mental status    No prior studies present for comparison  Portable study, 9:06 AM  Cardiac monitor leads present. Elevated diaphragm, shallow inspiration.   The aorta shows atherosclerotic changes. The lungs are clear. There is no   sign of infiltrate effusion or congestive failure. Heart size is within   normal limits. Hilar regions and mediastinal contours are unremarkable.    Old left rib fracture present.    IMPRESSION:    No active disease.              Assessment :   88 year old male Dementia admitted with mental status change and BRAYDEN   Severe dehydration with hypernatremia  Enterovirus/Rhinovirus infection   Rash likely sec viral illness  New fever poss aspiration pneumonia  Made NPO  BRAYDEN improving    Plan :   Zosyn for now  Reculture  Trend wbc  Fu cultures  IVF  Monitor renal fx  Aspiration precautions      Critical care time greater then 35 minutes reviewing notes, labs data/ imaging , discussion with multidisciplinary team.    Thank you for allowing me to participate in care of your patient .        Rik Erwin MD  Infectious Disease  [phone]

## 2017-09-03 NOTE — PROGRESS NOTE ADULT - ASSESSMENT
88 male with    1. acute encephalopathy likely related to severe hypernatremia/dehydration:  metabolic encephalopathy- D5W or D5 1/2NS at about 75 cc/hr. check BMP q4-6 hrs. Avoid rapid correction of sodium. F/up renal recs - Dr. Jason. Monitor intake and output.    2. dementia with behavioral disturbance: risperdal prn. f/up neuro recs    3. BRAYDEN/ATN due to dehydration: renal dose meds and avoid nephrotoxics. monitor intake and output on IVF. f/up renal recs    4. leukocytosis: likely reactive to stress/dehydration. avoid further abx. patient seems to have completed 10 day course of augmentin for sinusitis.    5. poor dentition/aspiration risk: swallow eval. NPO for now    6. deconditioning: PT/Nutrition eval.    7. heparin SQ for dvt ppx    8. dispo: home with services when acute medical issues resolved.  poor prognosis.  DNR.   8.Skin rashes- ID evaluation .

## 2017-09-03 NOTE — PROVIDER CONTACT NOTE (CRITICAL VALUE NOTIFICATION) - ACTION/TREATMENT ORDERED:
another liter of IV fluids
change IVF to D5 @100cchr repeat BMP at 0100
iv bolus 2liters given. zosyn q12 started. dr chamberlain in and aware

## 2017-09-03 NOTE — PROGRESS NOTE ADULT - SUBJECTIVE AND OBJECTIVE BOX
Date/Time Patient Seen:  		  Referring MD:   Data Reviewed	       Patient is a 88y old  Male who presents with a chief complaint of lethargy/AMS (01 Sep 2017 11:05)  in bed  seen and examined  vs and meds reviewed        Subjective/HPI     PAST MEDICAL & SURGICAL HISTORY:  Dementia with behavioral disturbance  No pertinent past medical history  No significant past surgical history        Medication list         MEDICATIONS  (STANDING):  heparin  Injectable 5000 Unit(s) SubCutaneous every 12 hours  risperiDONE   Tablet 0.25 milliGRAM(s) Oral at bedtime  dextrose 5%. 1000 milliLiter(s) (100 mL/Hr) IV Continuous <Continuous>    MEDICATIONS  (PRN):  acetaminophen    Suspension. 650 milliGRAM(s) Oral every 8 hours PRN Moderate Pain (4 - 6)  acetaminophen  Suppository 650 milliGRAM(s) Rectal every 6 hours PRN For Temp greater than 38 C (100.4 F)         Vitals log        ICU Vital Signs Last 24 Hrs  T(C): 36.7 (03 Sep 2017 04:01), Max: 38.2 (02 Sep 2017 23:17)  T(F): 98.1 (03 Sep 2017 04:01), Max: 100.8 (02 Sep 2017 23:17)  HR: 75 (03 Sep 2017 04:00) (70 - 105)  BP: 103/52 (03 Sep 2017 04:00) (90/55 - 138/76)  BP(mean): 69 (03 Sep 2017 04:00) (66 - 103)  ABP: --  ABP(mean): --  RR: 16 (03 Sep 2017 04:00) (15 - 26)  SpO2: 98% (03 Sep 2017 04:00) (96% - 100%)           Input and Output:  I&O's Detail    01 Sep 2017 07:01  -  02 Sep 2017 07:00  --------------------------------------------------------  IN:    dextrose 5% + sodium chloride 0.45%.: 300 mL    dextrose 5%.: 900 mL    Oral Fluid: 100 mL  Total IN: 1300 mL    OUT:  Total OUT: 0 mL    Total NET: 1300 mL      02 Sep 2017 07:01  -  03 Sep 2017 06:59  --------------------------------------------------------  IN:    dextrose 5%.: 2200 mL  Total IN: 2200 mL    OUT:  Total OUT: 0 mL    Total NET: 2200 mL          Lab Data                        13.6   12.6  )-----------( 605      ( 03 Sep 2017 06:36 )             43.6     09-02    159<H>  |  126<H>  |  66<H>  ----------------------------<  124<H>  4.1   |  21<L>  |  3.11<H>    Ca    7.8<L>      02 Sep 2017 07:00    TPro  8.4<H>  /  Alb  3.2<L>  /  TBili  0.5  /  DBili  x   /  AST  13  /  ALT  <10<L>  /  AlkPhos  80  09-01      CARDIAC MARKERS ( 01 Sep 2017 08:53 )  .005 ng/mL / x     / x     / x     / x            Review of Systems	      Objective     Physical Examination  head at  heart - s1s2  poor dentition  lungs - dec BS        Pertinent Lab findings & Imaging      Renea:  NO   Adequate UO     I&O's Detail    01 Sep 2017 07:01  -  02 Sep 2017 07:00  --------------------------------------------------------  IN:    dextrose 5% + sodium chloride 0.45%.: 300 mL    dextrose 5%.: 900 mL    Oral Fluid: 100 mL  Total IN: 1300 mL    OUT:  Total OUT: 0 mL    Total NET: 1300 mL      02 Sep 2017 07:01  -  03 Sep 2017 06:59  --------------------------------------------------------  IN:    dextrose 5%.: 2200 mL  Total IN: 2200 mL    OUT:  Total OUT: 0 mL    Total NET: 2200 mL               Discussed with:     Cultures:	        Radiology

## 2017-09-03 NOTE — PROGRESS NOTE ADULT - SUBJECTIVE AND OBJECTIVE BOX
Neurology follow up note    AZUL VILLA88yMale      Interval History:    Patient feels ok no new complaints.    MEDICATIONS    heparin  Injectable 5000 Unit(s) SubCutaneous every 12 hours  risperiDONE   Tablet 0.25 milliGRAM(s) Oral at bedtime  acetaminophen    Suspension. 650 milliGRAM(s) Oral every 8 hours PRN  dextrose 5%. 1000 milliLiter(s) IV Continuous <Continuous>  acetaminophen  Suppository 650 milliGRAM(s) Rectal every 6 hours PRN      Allergies    No Known Allergies    Intolerances            Vital Signs Last 24 Hrs  T(C): 36.9 (03 Sep 2017 08:11), Max: 38.2 (02 Sep 2017 23:17)  T(F): 98.4 (03 Sep 2017 08:11), Max: 100.8 (02 Sep 2017 23:17)  HR: 67 (03 Sep 2017 10:00) (64 - 105)  BP: 117/62 (03 Sep 2017 10:00) (90/55 - 138/76)  BP(mean): 78 (03 Sep 2017 10:00) (66 - 88)  RR: 15 (03 Sep 2017 10:00) (12 - 26)  SpO2: 99% (03 Sep 2017 10:00) (89% - 100%)        REVIEW OF SYSTEMS: Mostly  Non Verbal     On Neurological Examination:    Mental Status - Patient is awake       Does not follow commands    Speech -   says 1 to 2                        Cranial Nerves - Pupils 3 mm equal and reactive to light,   extraocular eye movements intact.   smile symmetric  intact bilateral NLF    Motor Exam -     With stimuli positive movement of all 4 extremities    Muscle tone - is normal all over.  No asymmetry is seen.      Sensory    Bilateral intact to light touch    GENERAL Exam: Nontoxic , No Acute Distress   	  HEENT:  normocephalic, atraumatic  		  LUNGS:   Decreased bilaterally  	  HEART: Normal S1S2   No murmur RRR        	  GI/ ABDOMEN:  Soft  Non tender    EXTREMITIES:   No Edema  No Clubbing  No Cyanosis No Edema    MUSCULOSKELETAL:  decreased Range of Motion all 4 extremities   	   SKIN: Normal  No Ecchymosis          LABS:  CBC Full  -  ( 03 Sep 2017 06:36 )  WBC Count : 12.6 K/uL  Hemoglobin : 13.6 g/dL  Hematocrit : 43.6 %  Platelet Count - Automated : 605 K/uL  Mean Cell Volume : 97.1 fl  Mean Cell Hemoglobin : 30.3 pg  Mean Cell Hemoglobin Concentration : 31.2 gm/dL  Auto Neutrophil # : x  Auto Lymphocyte # : x  Auto Monocyte # : x  Auto Eosinophil # : x  Auto Basophil # : x  Auto Neutrophil % : x  Auto Lymphocyte % : x  Auto Monocyte % : x  Auto Eosinophil % : x  Auto Basophil % : x      09-03    148<H>  |  114<H>  |  46<H>  ----------------------------<  116<H>  3.8   |  23  |  2.50<H>    Ca    8.3<L>      03 Sep 2017 06:36      Hemoglobin A1C:       Vitamin B12         RADIOLOGY      ANALYSIS AND ZACK:  An 88-year-old with episode of changes in mental status and history of dementia.  1.	For changes in mental status, most likely toxic metabolic encephalopathy, which is multifactorial, possibly secondary to underlying viral-type syndrome along with hypernatremia and acute kidney injury.  2.	Recommend IV fluid hydration and monitor renal function.  3.	Recommend antibiotics as needed.  4.	Monitor patient's electrolytes.  5.	aspiration  precautions   6.	speech and swallow   7.	For history of dementia, for now we will recommend supportive therapy.  I spoke with daughter Valery at [phone] 9/2/17. no response today   She is aware that in the hospital setting, patient may become more disoriented.  He does have a history of sundowning and if necessary she will come stay with him.  We will continue to follow.      30 minutes spent on total encounter; more than 50% of the visit was spent counseling and/or coordinating care by the attending physician.

## 2017-09-04 DIAGNOSIS — E87.70 FLUID OVERLOAD, UNSPECIFIED: ICD-10-CM

## 2017-09-04 LAB
ANION GAP SERPL CALC-SCNC: 11 MMOL/L — SIGNIFICANT CHANGE UP (ref 5–17)
BUN SERPL-MCNC: 36 MG/DL — HIGH (ref 7–23)
CALCIUM SERPL-MCNC: 7.7 MG/DL — LOW (ref 8.4–10.5)
CHLORIDE SERPL-SCNC: 112 MMOL/L — HIGH (ref 96–108)
CO2 SERPL-SCNC: 19 MMOL/L — LOW (ref 22–31)
CREAT SERPL-MCNC: 2.23 MG/DL — HIGH (ref 0.5–1.3)
GLUCOSE SERPL-MCNC: 98 MG/DL — SIGNIFICANT CHANGE UP (ref 70–99)
GRAM STN FLD: SIGNIFICANT CHANGE UP
HCT VFR BLD CALC: 43.2 % — SIGNIFICANT CHANGE UP (ref 39–50)
HGB BLD-MCNC: 13.8 G/DL — SIGNIFICANT CHANGE UP (ref 13–17)
LACTATE SERPL-SCNC: 1.6 MMOL/L — SIGNIFICANT CHANGE UP (ref 0.7–2)
MCHC RBC-ENTMCNC: 31 PG — SIGNIFICANT CHANGE UP (ref 27–34)
MCHC RBC-ENTMCNC: 32 GM/DL — SIGNIFICANT CHANGE UP (ref 32–36)
MCV RBC AUTO: 97 FL — SIGNIFICANT CHANGE UP (ref 80–100)
NT-PROBNP SERPL-SCNC: 3360 PG/ML — HIGH (ref 0–450)
PLATELET # BLD AUTO: 249 K/UL — SIGNIFICANT CHANGE UP (ref 150–400)
POTASSIUM SERPL-MCNC: 3.8 MMOL/L — SIGNIFICANT CHANGE UP (ref 3.5–5.3)
POTASSIUM SERPL-SCNC: 3.8 MMOL/L — SIGNIFICANT CHANGE UP (ref 3.5–5.3)
RBC # BLD: 4.46 M/UL — SIGNIFICANT CHANGE UP (ref 4.2–5.8)
RBC # FLD: 15.2 % — HIGH (ref 10.3–14.5)
SODIUM SERPL-SCNC: 142 MMOL/L — SIGNIFICANT CHANGE UP (ref 135–145)
SPECIMEN SOURCE: SIGNIFICANT CHANGE UP
WBC # BLD: 16.9 K/UL — HIGH (ref 3.8–10.5)
WBC # FLD AUTO: 16.9 K/UL — HIGH (ref 3.8–10.5)

## 2017-09-04 PROCEDURE — 99233 SBSQ HOSP IP/OBS HIGH 50: CPT

## 2017-09-04 RX ORDER — ALBUTEROL 90 UG/1
2.5 AEROSOL, METERED ORAL EVERY 12 HOURS
Qty: 0 | Refills: 0 | Status: DISCONTINUED | OUTPATIENT
Start: 2017-09-04 | End: 2017-09-11

## 2017-09-04 RX ORDER — LIDOCAINE 4 G/100G
1 CREAM TOPICAL DAILY
Qty: 0 | Refills: 0 | Status: DISCONTINUED | OUTPATIENT
Start: 2017-09-04 | End: 2017-09-11

## 2017-09-04 RX ADMIN — HEPARIN SODIUM 5000 UNIT(S): 5000 INJECTION INTRAVENOUS; SUBCUTANEOUS at 09:57

## 2017-09-04 RX ADMIN — ALBUTEROL 2.5 MILLIGRAM(S): 90 AEROSOL, METERED ORAL at 08:09

## 2017-09-04 RX ADMIN — ALBUTEROL 2.5 MILLIGRAM(S): 90 AEROSOL, METERED ORAL at 20:21

## 2017-09-04 RX ADMIN — SODIUM CHLORIDE 75 MILLILITER(S): 9 INJECTION, SOLUTION INTRAVENOUS at 05:52

## 2017-09-04 RX ADMIN — PIPERACILLIN AND TAZOBACTAM 25 GRAM(S): 4; .5 INJECTION, POWDER, LYOPHILIZED, FOR SOLUTION INTRAVENOUS at 21:53

## 2017-09-04 RX ADMIN — SODIUM CHLORIDE 50 MILLILITER(S): 9 INJECTION, SOLUTION INTRAVENOUS at 21:58

## 2017-09-04 RX ADMIN — PIPERACILLIN AND TAZOBACTAM 25 GRAM(S): 4; .5 INJECTION, POWDER, LYOPHILIZED, FOR SOLUTION INTRAVENOUS at 05:52

## 2017-09-04 RX ADMIN — HEPARIN SODIUM 5000 UNIT(S): 5000 INJECTION INTRAVENOUS; SUBCUTANEOUS at 21:54

## 2017-09-04 NOTE — PROGRESS NOTE ADULT - SUBJECTIVE AND OBJECTIVE BOX
Patient seen in follow up for BRAYDEN, hypernatremia; appears comfortable, confused.    MEDICATIONS  (STANDING):  heparin  Injectable 5000 Unit(s) SubCutaneous every 12 hours  risperiDONE   Tablet 0.25 milliGRAM(s) Oral at bedtime  dextrose 5%. 1000 milliLiter(s) (50 mL/Hr) IV Continuous <Continuous>  piperacillin/tazobactam IVPB. 3.375 Gram(s) IV Intermittent every 12 hours  ALBUTerol    0.083% 2.5 milliGRAM(s) Nebulizer every 12 hours    MEDICATIONS  (PRN):  acetaminophen    Suspension. 650 milliGRAM(s) Oral every 8 hours PRN Moderate Pain (4 - 6)  acetaminophen  Suppository 650 milliGRAM(s) Rectal every 6 hours PRN For Temp greater than 38 C (100.4 F)    PHYSICAL EXAM:      T(C): 37 (09-04-17 @ 11:59), Max: 38.9 (09-03-17 @ 19:30)  HR: 68 (09-04-17 @ 12:00) (60 - 121)  BP: 125/55 (09-04-17 @ 12:00) (89/57 - 128/74)  RR: 16 (09-04-17 @ 12:00) (15 - 26)  SpO2: 99% (09-04-17 @ 12:00) (96% - 100%)  Wt(kg): --  Respiratory: clear anteriorly, decreased BS at bases  Cardiovascular: S1 S2  Gastrointestinal: soft NT ND +BS  Extremities:  1 edema                                    13.8   16.9  )-----------( 249      ( 04 Sep 2017 06:20 )             43.2     09-04    142  |  112<H>  |  36<H>  ----------------------------<  98  3.8   |  19<L>  |  2.23<H>    Ca    7.7<L>      04 Sep 2017 06:20            Assessment and Plan:    BRAYDEN, hypernatremia; degree CKD unclear; improving.  Continue current rx.

## 2017-09-04 NOTE — PROGRESS NOTE ADULT - ASSESSMENT
88 male with    1. acute encephalopathy likely related to severe hypernatremia/dehydration:  metabolic encephalopathy- c/w ivf. hyponatremia improved.    2. dementia with behavioral disturbance: risperdal prn. f/up neuro recs    3. BRAYDEN/ATN due to dehydration: renal dose meds and avoid nephrotoxics. monitor intake and output on IVF. f/up renal recs    4. aspiration pneumonia- bilateral lower lobe-  started on iv zosyn.    5. poor dentition/aspiration risk: swallow eval. NPO for now    6. deconditioning: PT/Nutrition eval.    7. heparin SQ for dvt ppx    8. dispo: home with services when acute medical issues resolved.  poor prognosis.  DNR.   8.Skin rashes- likly due to viral infection.  speech and swallow revaluation.

## 2017-09-04 NOTE — PROGRESS NOTE ADULT - SUBJECTIVE AND OBJECTIVE BOX
Neurology follow up note    AZUL VILLA88yMale      Interval History:    Patient awake in bed     MEDICATIONS    heparin  Injectable 5000 Unit(s) SubCutaneous every 12 hours  risperiDONE   Tablet 0.25 milliGRAM(s) Oral at bedtime  acetaminophen    Suspension. 650 milliGRAM(s) Oral every 8 hours PRN  dextrose 5%. 1000 milliLiter(s) IV Continuous <Continuous>  acetaminophen  Suppository 650 milliGRAM(s) Rectal every 6 hours PRN  piperacillin/tazobactam IVPB. 3.375 Gram(s) IV Intermittent every 12 hours  ALBUTerol    0.083% 2.5 milliGRAM(s) Nebulizer every 12 hours      Allergies    No Known Allergies    Intolerances            Vital Signs Last 24 Hrs  T(C): 36.8 (04 Sep 2017 04:01), Max: 38.9 (03 Sep 2017 19:30)  T(F): 98.2 (04 Sep 2017 04:01), Max: 102.1 (03 Sep 2017 19:30)  HR: 80 (04 Sep 2017 08:33) (60 - 121)  BP: 89/57 (04 Sep 2017 04:00) (89/57 - 128/74)  BP(mean): 68 (04 Sep 2017 04:00) (63 - 90)  RR: 26 (04 Sep 2017 06:00) (11 - 26)  SpO2: 98% (04 Sep 2017 08:33) (96% - 100%)      REVIEW OF SYSTEMS: Limit or unable to obtain secondary to patient's poor mental status.        On Neurological Examination:    Mental Status - Patient is alert, awake,       Follow simple commands      Speech -   Fluent      Cranial Nerves - Pupils 3 mm equal and reactive to light,   extraocular eye movements intact.   smile symmetric  intact bilateral NLF    Motor Exam -     positive movement of all 4 extremities    Muscle tone - is normal all over.  No asymmetry is seen.      Sensory    Bilateral intact to light touch    Gait -  normal  ataxia     GENERAL Exam: Nontoxic , No Acute Distress   	  HEENT:  normocephalic, atraumatic  		  LUNGS:   Decreased bilaterally  	  HEART: Normal S1S2   No murmur RRR        	  GI/ ABDOMEN:  Soft  Non tender    EXTREMITIES:   No Edema  No Clubbing  No Cyanosis No Edema    MUSCULOSKELETAL: Normal Range of Motion  	   SKIN: Normal  No Ecchymosis               LABS:  CBC Full  -  ( 04 Sep 2017 06:20 )  WBC Count : 16.9 K/uL  Hemoglobin : 13.8 g/dL  Hematocrit : 43.2 %  Platelet Count - Automated : 249 K/uL  Mean Cell Volume : 97.0 fl  Mean Cell Hemoglobin : 31.0 pg  Mean Cell Hemoglobin Concentration : 32.0 gm/dL  Auto Neutrophil # : x  Auto Lymphocyte # : x  Auto Monocyte # : x  Auto Eosinophil # : x  Auto Basophil # : x  Auto Neutrophil % : x  Auto Lymphocyte % : x  Auto Monocyte % : x  Auto Eosinophil % : x  Auto Basophil % : x    Urinalysis Basic - ( 03 Sep 2017 20:56 )    Color: Yellow / Appearance: x / S.020 / pH: x  Gluc: x / Ketone: Negative  / Bili: Negative / Urobili: Negative mg/dL   Blood: x / Protein: 30 mg/dL / Nitrite: Negative   Leuk Esterase: Trace / RBC: 25-50 /HPF / WBC 3-5   Sq Epi: x / Non Sq Epi: Moderate / Bacteria: TNTC          142  |  112<H>  |  36<H>  ----------------------------<  98  3.8   |  19<L>  |  2.23<H>    Ca    7.7<L>      04 Sep 2017 06:20      Hemoglobin A1C:       Vitamin B12         RADIOLOGY    ANALYSIS AND PLAN:  An 88-year-old with episode of changes in mental status and history of dementia.  1.	For changes in mental status, most likely toxic metabolic encephalopathy, which is multifactorial, possibly secondary to underlying viral-type syndrome along with hypernatremia and acute kidney injury.  2.	Recommend IV fluid hydration and monitor renal function.  3.	Recommend antibiotics as needed.  4.	Monitor patient's electrolytes.  5.	aspiration  precautions   6.	speech and swallow   7.	PT eval if possible  8.	renal function improving   9.	For history of dementia, for now we will recommend supportive therapy.  I spoke with daughter Valery at [phone] 17.  She is aware that in the hospital setting, patient may become more disoriented.  He does have a history of sundowning and if necessary she will come stay with him.  We will continue to follow.      30 minutes spent on total encounter; more than 50% of the visit was spent counseling and/or coordinating care by the attending physician.

## 2017-09-04 NOTE — PROGRESS NOTE ADULT - SUBJECTIVE AND OBJECTIVE BOX
AZUL VILLA is a 88yMale , patient examined and chart reviewed.     INTERVAL HPI/ OVERNIGHT EVENTS:  More awake. Confused but denies pain.  Failed swallow study.    Past Medical History--  PAST MEDICAL & SURGICAL HISTORY:  Dementia with behavioral disturbance  No significant past surgical history      For details regarding the patient's social history, family history, and other miscellaneous elements, please refer the initial infectious diseases consultation and/or the admitting history and physical examination for this admission.      ROS:  Unable to obtain due to : Confusion.        Current inpatient medications :    ANTIBIOTICS/RELEVANT:  piperacillin/tazobactam IVPB. 3.375 Gram(s) IV Intermittent every 12 hours      heparin  Injectable 5000 Unit(s) SubCutaneous every 12 hours  risperiDONE   Tablet 0.25 milliGRAM(s) Oral at bedtime  acetaminophen    Suspension. 650 milliGRAM(s) Oral every 8 hours PRN  dextrose 5%. 1000 milliLiter(s) IV Continuous <Continuous>  acetaminophen  Suppository 650 milliGRAM(s) Rectal every 6 hours PRN  ALBUTerol    0.083% 2.5 milliGRAM(s) Nebulizer every 12 hours      Objective:     @ 07:  -   @ 07:00  --------------------------------------------------------  IN: 3750 mL / OUT: 0 mL / NET: 3750 mL     @ 07:01  -   @ 18:17  --------------------------------------------------------  IN: 450 mL / OUT: 0 mL / NET: 450 mL      T(C): 36.9 (17 @ 16:00), Max: 38.9 (17 @ 19:30)  HR: 77 (17 @ 16:00) (64 - 121)  BP: 96/59 (17 @ 16:00) (89/57 - 128/74)  RR: 23 (17 @ 16:00) (15 - 26)  SpO2: 100% (17 @ 16:00) (96% - 100%)  Wt(kg): --      Physical Exam:  GEN: Confused  HEENT: normocephalic and atraumatic. EOMI. JOSE ALFREDO. Moist mucosa. Clear Posterior pharynx.  NECK: Supple. No carotid bruits.  No lymphadenopathy or thyromegaly.  LUNGS: Clear to auscultation.  HEART: Regular rate and rhythm without murmur.  ABDOMEN: Soft, nontender, and nondistended.  Positive bowel sounds.    EXTREMITIES: Without any cyanosis, clubbing, rash, lesions or edema.  NEUROLOGIC: Confused   SKIN: +rash    LABS:                        13.8   16.9  )-----------( 249      ( 04 Sep 2017 06:20 )             43.2           142  |  112<H>  |  36<H>  ----------------------------<  98  3.8   |  19<L>  |  2.23<H>    Ca    7.7<L>      04 Sep 2017 06:20      Urinalysis Basic - ( 03 Sep 2017 20:56 )    Color: Yellow / Appearance: x / S.020 / pH: x  Gluc: x / Ketone: Negative  / Bili: Negative / Urobili: Negative mg/dL   Blood: x / Protein: 30 mg/dL / Nitrite: Negative   Leuk Esterase: Trace / RBC: 25-50 /HPF / WBC 3-5   Sq Epi: x / Non Sq Epi: Moderate / Bacteria: TNTC        RECENT CULTURES:  Culture - Sputum . (17 @ 13:53)    Gram Stain:   No polymorphonuclear leukocytes per low power field  No Squamous epithelial cells per low power field  Moderate Yeast like cells  Rare Gram Negative Rods    Specimen Source: .Sputum Sputum trap      RADIOLOGY & ADDITIONAL STUDIES:    EXAM:  CHEST-PORTABLE IMMEDIATE                                  PROCEDURE DATE:  2017          INTERPRETATION:  CLINICAL INFORMATION:Fever    TECHNIQUE: AP chest film. Comparison is made to 2017    FINDINGS: There are faint patchy opacities at the lung bases.  Heart size   is within normal limits, however the aorta is ectatic. There are   degenerative changes of the spine.    IMPRESSION: New faint patchy opacities at the lung bases compatible with   atelectasis and/or pneumonia.      Assessment :   88 year old male Dementia admitted with mental status change and BRAYDEN   Severe dehydration with hypernatremia  Enterovirus/Rhinovirus infection   Rash likely sec viral illness  New fever likely aspiration pneumonia  CXR noted  Made NPO  BRAYDEN improving    Plan :   Zosyn for now  Fu repeat cultures  Trend wbc  IVF  Monitor renal fx      Critical care time greater then 35 minutes reviewing notes, labs data/ imaging , discussion with multidisciplinary team.    Thank you for allowing me to participate in care of your patient .        Rik Erwin MD  Infectious Disease  [phone]

## 2017-09-04 NOTE — PROGRESS NOTE ADULT - SUBJECTIVE AND OBJECTIVE BOX
Date/Time Patient Seen:  		  Referring MD:   Data Reviewed	       Patient is a 88y old  Male who presents with a chief complaint of lethargy/AMS (01 Sep 2017 11:05)  in bed  seen and examined  vs and meds reviewed      Subjective/HPI     PAST MEDICAL & SURGICAL HISTORY:  Dementia with behavioral disturbance  No pertinent past medical history  No significant past surgical history        Medication list         MEDICATIONS  (STANDING):  heparin  Injectable 5000 Unit(s) SubCutaneous every 12 hours  risperiDONE   Tablet 0.25 milliGRAM(s) Oral at bedtime  dextrose 5%. 1000 milliLiter(s) (75 mL/Hr) IV Continuous <Continuous>  piperacillin/tazobactam IVPB. 3.375 Gram(s) IV Intermittent every 12 hours  ALBUTerol    0.083% 2.5 milliGRAM(s) Nebulizer every 12 hours    MEDICATIONS  (PRN):  acetaminophen    Suspension. 650 milliGRAM(s) Oral every 8 hours PRN Moderate Pain (4 - 6)  acetaminophen  Suppository 650 milliGRAM(s) Rectal every 6 hours PRN For Temp greater than 38 C (100.4 F)         Vitals log        ICU Vital Signs Last 24 Hrs  T(C): 36.8 (04 Sep 2017 04:01), Max: 38.9 (03 Sep 2017 19:30)  T(F): 98.2 (04 Sep 2017 04:01), Max: 102.1 (03 Sep 2017 19:30)  HR: 64 (04 Sep 2017 04:00) (60 - 121)  BP: 89/57 (04 Sep 2017 04:00) (89/57 - 128/74)  BP(mean): 68 (04 Sep 2017 04:00) (63 - 90)  ABP: --  ABP(mean): --  RR: 18 (04 Sep 2017 04:00) (11 - 20)  SpO2: 100% (04 Sep 2017 04:00) (96% - 100%)           Input and Output:  I&O's Detail    02 Sep 2017 07:01  -  03 Sep 2017 07:00  --------------------------------------------------------  IN:    dextrose 5%.: 2400 mL  Total IN: 2400 mL    OUT:  Total OUT: 0 mL    Total NET: 2400 mL      03 Sep 2017 07:01  -  04 Sep 2017 06:59  --------------------------------------------------------  IN:    dextrose 5%.: 1650 mL    IV PiggyBack: 100 mL    Sodium Chloride 0.9% IV Bolus: 2000 mL  Total IN: 3750 mL    OUT:  Total OUT: 0 mL    Total NET: 3750 mL          Lab Data                        13.8   16.9  )-----------( 249      ( 04 Sep 2017 06:20 )             43.2     09-03    148<H>  |  114<H>  |  46<H>  ----------------------------<  116<H>  3.8   |  23  |  2.50<H>    Ca    8.3<L>      03 Sep 2017 06:36              Review of Systems	      Objective     Physical Examination  head at  heart - s1s2  lungs - crackles  abd - soft        Pertinent Lab findings & Imaging      Renea:  NO   Adequate UO     I&O's Detail    02 Sep 2017 07:01  -  03 Sep 2017 07:00  --------------------------------------------------------  IN:    dextrose 5%.: 2400 mL  Total IN: 2400 mL    OUT:  Total OUT: 0 mL    Total NET: 2400 mL      03 Sep 2017 07:01  -  04 Sep 2017 06:59  --------------------------------------------------------  IN:    dextrose 5%.: 1650 mL    IV PiggyBack: 100 mL    Sodium Chloride 0.9% IV Bolus: 2000 mL  Total IN: 3750 mL    OUT:  Total OUT: 0 mL    Total NET: 3750 mL               Discussed with:     Cultures:	        Radiology

## 2017-09-04 NOTE — PROGRESS NOTE ADULT - SUBJECTIVE AND OBJECTIVE BOX
Patient is a 88y old  Male who presents with a chief complaint of lethargy/AMS (01 Sep 2017 11:05)      INTERVAL HPI/OVERNIGHT EVENTS:  Pt is seen and examined.   confused, sleeping comfortably. started on Iv zosyn for aspiration pneumonia.    Pain Location & Control:     MEDICATIONS  (STANDING):  heparin  Injectable 5000 Unit(s) SubCutaneous every 12 hours  risperiDONE   Tablet 0.25 milliGRAM(s) Oral at bedtime  dextrose 5%. 1000 milliLiter(s) (50 mL/Hr) IV Continuous <Continuous>  piperacillin/tazobactam IVPB. 3.375 Gram(s) IV Intermittent every 12 hours  ALBUTerol    0.083% 2.5 milliGRAM(s) Nebulizer every 12 hours    MEDICATIONS  (PRN):  acetaminophen    Suspension. 650 milliGRAM(s) Oral every 8 hours PRN Moderate Pain (4 - 6)  acetaminophen  Suppository 650 milliGRAM(s) Rectal every 6 hours PRN For Temp greater than 38 C (100.4 F)      Allergies    No Known Allergies    Intolerances            Vital Signs Last 24 Hrs  T(C): 36.4 (04 Sep 2017 07:45), Max: 38.9 (03 Sep 2017 19:30)  T(F): 97.5 (04 Sep 2017 07:45), Max: 102.1 (03 Sep 2017 19:30)  HR: 92 (04 Sep 2017 10:00) (60 - 121)  BP: 122/63 (04 Sep 2017 10:00) (89/57 - 128/74)  BP(mean): 78 (04 Sep 2017 10:00) (63 - 90)  RR: 17 (04 Sep 2017 10:00) (11 - 26)  SpO2: 100% (04 Sep 2017 10:00) (96% - 100%)        LABS:                        13.8   16.9  )-----------( 249      ( 04 Sep 2017 06:20 )             43.2     04 Sep 2017 06:20    142    |  112    |  36     ----------------------------<  98     3.8     |  19     |  2.23     Ca    7.7        04 Sep 2017 06:20        Urinalysis Basic - ( 03 Sep 2017 20:56 )    Color: Yellow / Appearance: x / S.020 / pH: x  Gluc: x / Ketone: Negative  / Bili: Negative / Urobili: Negative mg/dL   Blood: x / Protein: 30 mg/dL / Nitrite: Negative   Leuk Esterase: Trace / RBC: 25-50 /HPF / WBC 3-5   Sq Epi: x / Non Sq Epi: Moderate / Bacteria: TNTC      CAPILLARY BLOOD GLUCOSE  132 (04 Sep 2017 06:00)  120 (03 Sep 2017 23:21)            Cultures    Lactate, Blood: 1.6 mmol/L ( @ 06:21)  Lactate, Blood: 2.2 mmol/L ( @ 20:55)    RADIOLOGY & ADDITIONAL TESTS:  < from: Xray Chest 1 View AP-PORTABLE IMMEDIATE (17 @ 20:09) >  EXAM:  CHEST-PORTABLE IMMEDIATE          < from: Xray Chest 1 View AP-PORTABLE IMMEDIATE (17 @ 20:09) >  IMPRESSION: New faint patchy opacities at the lung bases compatible with   atelectasis and/or pneumonia.          Imaging Personally Reviewed:  [ ] YES  [ ] NO    Consultant(s) Notes Reviewed:  [ ] YES  [ ] NO    Care Discussed with Consultants/Other Providers [ ] YES  [ ] NO

## 2017-09-05 DIAGNOSIS — J69.0 PNEUMONITIS DUE TO INHALATION OF FOOD AND VOMIT: ICD-10-CM

## 2017-09-05 LAB
ANION GAP SERPL CALC-SCNC: 11 MMOL/L — SIGNIFICANT CHANGE UP (ref 5–17)
BUN SERPL-MCNC: 28 MG/DL — HIGH (ref 7–23)
CALCIUM SERPL-MCNC: 8 MG/DL — LOW (ref 8.4–10.5)
CHLORIDE SERPL-SCNC: 111 MMOL/L — HIGH (ref 96–108)
CO2 SERPL-SCNC: 22 MMOL/L — SIGNIFICANT CHANGE UP (ref 22–31)
CREAT SERPL-MCNC: 2.3 MG/DL — HIGH (ref 0.5–1.3)
CULTURE RESULTS: NO GROWTH — SIGNIFICANT CHANGE UP
GLUCOSE SERPL-MCNC: 87 MG/DL — SIGNIFICANT CHANGE UP (ref 70–99)
HCT VFR BLD CALC: 43.4 % — SIGNIFICANT CHANGE UP (ref 39–50)
HGB BLD-MCNC: 13.6 G/DL — SIGNIFICANT CHANGE UP (ref 13–17)
MCHC RBC-ENTMCNC: 29.7 PG — SIGNIFICANT CHANGE UP (ref 27–34)
MCHC RBC-ENTMCNC: 31.4 GM/DL — LOW (ref 32–36)
MCV RBC AUTO: 94.6 FL — SIGNIFICANT CHANGE UP (ref 80–100)
PLATELET # BLD AUTO: 316 K/UL — SIGNIFICANT CHANGE UP (ref 150–400)
POTASSIUM SERPL-MCNC: 3.8 MMOL/L — SIGNIFICANT CHANGE UP (ref 3.5–5.3)
POTASSIUM SERPL-SCNC: 3.8 MMOL/L — SIGNIFICANT CHANGE UP (ref 3.5–5.3)
RBC # BLD: 4.59 M/UL — SIGNIFICANT CHANGE UP (ref 4.2–5.8)
RBC # FLD: 15 % — HIGH (ref 10.3–14.5)
SODIUM SERPL-SCNC: 144 MMOL/L — SIGNIFICANT CHANGE UP (ref 135–145)
SPECIMEN SOURCE: SIGNIFICANT CHANGE UP
WBC # BLD: 17.2 K/UL — HIGH (ref 3.8–10.5)
WBC # FLD AUTO: 17.2 K/UL — HIGH (ref 3.8–10.5)

## 2017-09-05 PROCEDURE — 99233 SBSQ HOSP IP/OBS HIGH 50: CPT

## 2017-09-05 RX ADMIN — ALBUTEROL 2.5 MILLIGRAM(S): 90 AEROSOL, METERED ORAL at 07:43

## 2017-09-05 RX ADMIN — HEPARIN SODIUM 5000 UNIT(S): 5000 INJECTION INTRAVENOUS; SUBCUTANEOUS at 21:37

## 2017-09-05 RX ADMIN — PIPERACILLIN AND TAZOBACTAM 25 GRAM(S): 4; .5 INJECTION, POWDER, LYOPHILIZED, FOR SOLUTION INTRAVENOUS at 17:00

## 2017-09-05 RX ADMIN — HEPARIN SODIUM 5000 UNIT(S): 5000 INJECTION INTRAVENOUS; SUBCUTANEOUS at 10:39

## 2017-09-05 RX ADMIN — Medication 650 MILLIGRAM(S): at 22:40

## 2017-09-05 RX ADMIN — LIDOCAINE 1 APPLICATION(S): 4 CREAM TOPICAL at 05:45

## 2017-09-05 RX ADMIN — ALBUTEROL 2.5 MILLIGRAM(S): 90 AEROSOL, METERED ORAL at 20:01

## 2017-09-05 RX ADMIN — RISPERIDONE 0.25 MILLIGRAM(S): 4 TABLET ORAL at 21:37

## 2017-09-05 RX ADMIN — Medication 650 MILLIGRAM(S): at 21:37

## 2017-09-05 RX ADMIN — SODIUM CHLORIDE 75 MILLILITER(S): 9 INJECTION, SOLUTION INTRAVENOUS at 17:00

## 2017-09-05 RX ADMIN — PIPERACILLIN AND TAZOBACTAM 25 GRAM(S): 4; .5 INJECTION, POWDER, LYOPHILIZED, FOR SOLUTION INTRAVENOUS at 05:45

## 2017-09-05 NOTE — PROGRESS NOTE ADULT - SUBJECTIVE AND OBJECTIVE BOX
Patient is a 88y old  Male who presents with a chief complaint of lethargy/AMS (01 Sep 2017 11:05)      INTERVAL HPI/OVERNIGHT EVENTS:  Pt is seen and examined.  sittin on chair.  Pain Location & Control:     MEDICATIONS  (STANDING):  heparin  Injectable 5000 Unit(s) SubCutaneous every 12 hours  risperiDONE   Tablet 0.25 milliGRAM(s) Oral at bedtime  dextrose 5%. 1000 milliLiter(s) (75 mL/Hr) IV Continuous <Continuous>  piperacillin/tazobactam IVPB. 3.375 Gram(s) IV Intermittent every 12 hours  ALBUTerol    0.083% 2.5 milliGRAM(s) Nebulizer every 12 hours    MEDICATIONS  (PRN):  acetaminophen    Suspension. 650 milliGRAM(s) Oral every 8 hours PRN Moderate Pain (4 - 6)  acetaminophen  Suppository 650 milliGRAM(s) Rectal every 6 hours PRN For Temp greater than 38 C (100.4 F)  lidocaine 5% Ointment 1 Application(s) Topical daily PRN to viral rash on Back      Allergies    No Known Allergies    Intolerances            Vital Signs Last 24 Hrs  T(C): 36.7 (05 Sep 2017 12:08), Max: 37.8 (04 Sep 2017 22:00)  T(F): 98 (05 Sep 2017 12:08), Max: 100.1 (04 Sep 2017 22:00)  HR: 69 (05 Sep 2017 12:00) (69 - 103)  BP: 99/57 (05 Sep 2017 12:00) (96/49 - 136/70)  BP(mean): 71 (05 Sep 2017 12:00) (65 - 91)  RR: 18 (05 Sep 2017 12:00) (18 - 26)  SpO2: 100% (05 Sep 2017 12:00) (97% - 100%)        LABS:                        13.6   17.2  )-----------( 316      ( 05 Sep 2017 06:43 )             43.4     05 Sep 2017 06:43    144    |  111    |  28     ----------------------------<  87     3.8     |  22     |  2.30     Ca    8.0        05 Sep 2017 06:43        Urinalysis Basic - ( 03 Sep 2017 20:56 )    Color: Yellow / Appearance: x / S.020 / pH: x  Gluc: x / Ketone: Negative  / Bili: Negative / Urobili: Negative mg/dL   Blood: x / Protein: 30 mg/dL / Nitrite: Negative   Leuk Esterase: Trace / RBC: 25-50 /HPF / WBC 3-5   Sq Epi: x / Non Sq Epi: Moderate / Bacteria: TNTC      CAPILLARY BLOOD GLUCOSE  116 (05 Sep 2017 12:00)  90 (04 Sep 2017 23:28)      RADIOLOGY & ADDITIONAL TESTS:    Imaging Personally Reviewed:  [ ] YES  [ ] NO    Consultant(s) Notes Reviewed:  [ x] YES  [ ] NO    Care Discussed with Consultants/Other Providers [ x] YES  [ ] NO

## 2017-09-05 NOTE — PROGRESS NOTE ADULT - SUBJECTIVE AND OBJECTIVE BOX
AZUL VILLA is a 88yMale , patient examined and chart reviewed. Patient being followed for     INTERVAL HPI/ OVERNIGHT EVENTS:  More awake alert. Confused. Afebrile.    Past Medical History--  PAST MEDICAL & SURGICAL HISTORY:  Dementia with behavioral disturbance  No significant past surgical history      For details regarding the patient's social history, family history, and other miscellaneous elements, please refer the initial infectious diseases consultation and/or the admitting history and physical examination for this admission.      ROS:  CONSTITUTIONAL:  Negative fever or chills,  EYES:  Negative  blurry vision or double vision  CARDIOVASCULAR:  Negative for chest pain or palpitations  RESPIRATORY:  Negative for cough, wheezing, or SOB   GASTROINTESTINAL:  Negative for nausea, vomiting, diarrhea, constipation, or abdominal pain  GENITOURINARY:  Negative frequency, urgency , dysuria or hematuria   NEUROLOGIC:  No headache,  dizziness, lightheadedness + confusion  All other systems were reviewed and are negative       Current inpatient medications :    ANTIBIOTICS/RELEVANT:  piperacillin/tazobactam IVPB. 3.375 Gram(s) IV Intermittent every 12 hours      heparin  Injectable 5000 Unit(s) SubCutaneous every 12 hours  risperiDONE   Tablet 0.25 milliGRAM(s) Oral at bedtime  acetaminophen    Suspension. 650 milliGRAM(s) Oral every 8 hours PRN  dextrose 5%. 1000 milliLiter(s) IV Continuous <Continuous>  acetaminophen  Suppository 650 milliGRAM(s) Rectal every 6 hours PRN  ALBUTerol    0.083% 2.5 milliGRAM(s) Nebulizer every 12 hours  lidocaine 5% Ointment 1 Application(s) Topical daily PRN      Objective:     @ : @ 07:00  --------------------------------------------------------  IN: 1200 mL / OUT: 0 mL / NET: 1200 mL     @ : @ 17:40  --------------------------------------------------------  IN: 500 mL / OUT: 150 mL / NET: 350 mL      T(C): 36.4 (17 @ 16:06), Max: 37.8 (17 @ 22:00)  HR: 81 (17 @ 14:50) (69 - 103)  BP: 110/57 (17 @ 14:50) (96/49 - 136/70)  RR: 17 (17 @ 14:50) (17 - 26)  SpO2: 100% (17 @ 12:00) (97% - 100%)  Wt(kg): --      Physical Exam:  GEN: Awake confused no distress  HEENT: normocephalic and atraumatic. EOMI. JOSE ALFREDO. Moist mucosa. Clear Posterior pharynx.  NECK: Supple. No carotid bruits.  No lymphadenopathy or thyromegaly.  LUNGS: Decreased to auscultation.  HEART: Regular rate and rhythm without murmur.  ABDOMEN: Soft, nontender, and nondistended.  Positive bowel sounds.  No hepatosplenomegaly was noted.  EXTREMITIES: Without any cyanosis, clubbing, rash, lesions + edema.  NEUROLOGIC: No focal neurological deficits   SKIN: Rash    LABS:                        13.6   17.2  )-----------( 316      ( 05 Sep 2017 06:43 )             43.4           144  |  111<H>  |  28<H>  ----------------------------<  87  3.8   |  22  |  2.30<H>    Ca    8.0<L>      05 Sep 2017 06:43          Urinalysis Basic - ( 03 Sep 2017 20:56 )    Color: Yellow / Appearance: x / S.020 / pH: x  Gluc: x / Ketone: Negative  / Bili: Negative / Urobili: Negative mg/dL   Blood: x / Protein: 30 mg/dL / Nitrite: Negative   Leuk Esterase: Trace / RBC: 25-50 /HPF / WBC 3-5   Sq Epi: x / Non Sq Epi: Moderate / Bacteria: TNTC        RECENT CULTURES:  Culture - Sputum . (17 @ 13:53)    Gram Stain:   No polymorphonuclear leukocytes per low power field  No Squamous epithelial cells per low power field  Moderate Yeast like cells  Rare Gram Negative Rods    Specimen Source: .Sputum Sputum trap    Culture Results:   Numerous Klebsiella pneumoniae  Normal Respiratory Yumiko present    Culture - Blood (17 @ 12:44)    Specimen Source: .Blood    Culture Results:   No growth to date.    Culture - Urine (17 @ 11:35)    Specimen Source: .Urine Catheterized    Culture Results:   No growth      RADIOLOGY & ADDITIONAL STUDIES:  EXAM:  CHEST-PORTABLE IMMEDIATE                                  PROCEDURE DATE:  2017          INTERPRETATION:  CLINICAL INFORMATION:Fever    TECHNIQUE: AP chest film. Comparison is made to 2017    FINDINGS: There are faint patchy opacities at the lung bases.  Heart size   is within normal limits, however the aorta is ectatic. There are   degenerative changes of the spine.    IMPRESSION: New faint patchy opacities at the lung bases compatible with   atelectasis and/or pneumonia.        Assessment :   88 year old male Dementia admitted with mental status change and BRAYDEN   Sp severe dehydration with hypernatremia   Enterovirus/Rhinovirus infection   Rash likely sec viral illness  New fever likely aspiration pneumonia  CXR noted  Seen by speech - diet modified  BRAYDEN improving    Plan :   Zosyn for now day 3  Fu repeat cultures  Trend wbc  IVF  Monitor renal fx  DNRDNI      Critical care time greater then 35 minutes reviewing notes, labs data/ imaging , discussion with multidisciplinary team.    Thank you for allowing me to participate in care of your patient .      Rik Erwin MD  Infectious Disease  [phone]

## 2017-09-05 NOTE — PROGRESS NOTE ADULT - ASSESSMENT
88 male with    1. acute encephalopathy likely related to severe hypernatremia/dehydration:  metabolic encephalopathy- c/w ivf. hyponatremia improved.    2. dementia with behavioral disturbance: risperdal prn. f/up neuro recs    3. BRAYDEN/ATN due to dehydration: renal dose meds and avoid nephrotoxics. monitor intake and output on IVF. f/up renal recs    4. aspiration pneumonia- bilateral lower lobe-  started on iv zosyn.    5. poor dentition/aspiration risk: swallow eval. NPO for now    6. deconditioning: PT/Nutrition eval.    7. heparin SQ for dvt ppx    8. dispo: home with services when acute medical issues resolved.  poor prognosis.  DNR.   8.Skin rashes- likly due to viral infection.  speech and swallow revaluation. 88 male with    1. acute encephalopathy :  metabolic encephalopathy- c/w ivf. hyponatremia improved.    2. dementia with behavioral disturbance: risperdal prn. f/up neuro recs    3. BRAYDEN/ATN due to dehydration: renal dose meds and avoid nephrotoxics. monitor intake and output on IVF. f/up renal recs    4. aspiration pneumonia- bilateral lower lobe-  started on iv zosyn.    5. poor dentition/aspiration risk: swallow eval. NPO for now    6. deconditioning: PT/Nutrition eval.    7. heparin SQ for dvt ppx    8. dispo: home with services when acute medical issues resolved.  poor prognosis.  DNR.   8.Skin rashes- likly due to viral infection.  speech and swallow revaluation.

## 2017-09-05 NOTE — PROGRESS NOTE ADULT - SUBJECTIVE AND OBJECTIVE BOX
Date/Time Patient Seen:  		  Referring MD:   Data Reviewed	       Patient is a 88y old  Male who presents with a chief complaint of lethargy/AMS (01 Sep 2017 11:05)  in bed  seen and examined  on ABX overnight  vs and meds reviewed      Subjective/HPI     PAST MEDICAL & SURGICAL HISTORY:  Dementia with behavioral disturbance  No pertinent past medical history  No significant past surgical history        Medication list         MEDICATIONS  (STANDING):  heparin  Injectable 5000 Unit(s) SubCutaneous every 12 hours  risperiDONE   Tablet 0.25 milliGRAM(s) Oral at bedtime  dextrose 5%. 1000 milliLiter(s) (50 mL/Hr) IV Continuous <Continuous>  piperacillin/tazobactam IVPB. 3.375 Gram(s) IV Intermittent every 12 hours  ALBUTerol    0.083% 2.5 milliGRAM(s) Nebulizer every 12 hours    MEDICATIONS  (PRN):  acetaminophen    Suspension. 650 milliGRAM(s) Oral every 8 hours PRN Moderate Pain (4 - 6)  acetaminophen  Suppository 650 milliGRAM(s) Rectal every 6 hours PRN For Temp greater than 38 C (100.4 F)  lidocaine 5% Ointment 1 Application(s) Topical daily PRN to viral rash on Back         Vitals log        ICU Vital Signs Last 24 Hrs  T(C): 36.8 (05 Sep 2017 04:01), Max: 37.8 (04 Sep 2017 22:00)  T(F): 98.3 (05 Sep 2017 04:01), Max: 100.1 (04 Sep 2017 22:00)  HR: 90 (05 Sep 2017 04:00) (68 - 103)  BP: 128/59 (05 Sep 2017 04:00) (96/52 - 136/70)  BP(mean): 79 (05 Sep 2017 04:00) (66 - 91)  ABP: --  ABP(mean): --  RR: 22 (05 Sep 2017 04:00) (16 - 26)  SpO2: 97% (05 Sep 2017 04:00) (97% - 100%)           Input and Output:  I&O's Detail    03 Sep 2017 07:01  -  04 Sep 2017 07:00  --------------------------------------------------------  IN:    dextrose 5%.: 1650 mL    IV PiggyBack: 100 mL    Sodium Chloride 0.9% IV Bolus: 2000 mL  Total IN: 3750 mL    OUT:  Total OUT: 0 mL    Total NET: 3750 mL      04 Sep 2017 07:01  -  05 Sep 2017 06:49  --------------------------------------------------------  IN:    dextrose 5%.: 1000 mL    IV PiggyBack: 200 mL  Total IN: 1200 mL    OUT:  Total OUT: 0 mL    Total NET: 1200 mL          Lab Data                        13.6   17.2  )-----------( 316      ( 05 Sep 2017 06:43 )             43.4     09-04    142  |  112<H>  |  36<H>  ----------------------------<  98  3.8   |  19<L>  |  2.23<H>    Ca    7.7<L>      04 Sep 2017 06:20              Review of Systems	      Objective     Physical Examination    frail  weak  head at  heart - s1s2  lungs - dec BS  abd - soft      Pertinent Lab findings & Imaging      Renea:  NO   Adequate UO     I&O's Detail    03 Sep 2017 07:01  -  04 Sep 2017 07:00  --------------------------------------------------------  IN:    dextrose 5%.: 1650 mL    IV PiggyBack: 100 mL    Sodium Chloride 0.9% IV Bolus: 2000 mL  Total IN: 3750 mL    OUT:  Total OUT: 0 mL    Total NET: 3750 mL      04 Sep 2017 07:01  -  05 Sep 2017 06:49  --------------------------------------------------------  IN:    dextrose 5%.: 1000 mL    IV PiggyBack: 200 mL  Total IN: 1200 mL    OUT:  Total OUT: 0 mL    Total NET: 1200 mL               Discussed with:     Cultures:	        Radiology

## 2017-09-05 NOTE — PROGRESS NOTE ADULT - SUBJECTIVE AND OBJECTIVE BOX
Neurology follow up note    AZUL VILLA88yMale      Interval History:    Patient feels ok no new complaints.    MEDICATIONS    heparin  Injectable 5000 Unit(s) SubCutaneous every 12 hours  risperiDONE   Tablet 0.25 milliGRAM(s) Oral at bedtime  acetaminophen    Suspension. 650 milliGRAM(s) Oral every 8 hours PRN  dextrose 5%. 1000 milliLiter(s) IV Continuous <Continuous>  acetaminophen  Suppository 650 milliGRAM(s) Rectal every 6 hours PRN  piperacillin/tazobactam IVPB. 3.375 Gram(s) IV Intermittent every 12 hours  ALBUTerol    0.083% 2.5 milliGRAM(s) Nebulizer every 12 hours  lidocaine 5% Ointment 1 Application(s) Topical daily PRN      Allergies    No Known Allergies    Intolerances            Vital Signs Last 24 Hrs  T(C): 36.4 (05 Sep 2017 16:06), Max: 37.8 (04 Sep 2017 22:00)  T(F): 97.6 (05 Sep 2017 16:06), Max: 100.1 (04 Sep 2017 22:00)  HR: 69 (05 Sep 2017 12:00) (69 - 103)  BP: 99/57 (05 Sep 2017 12:00) (96/49 - 136/70)  BP(mean): 71 (05 Sep 2017 12:00) (65 - 91)  RR: 18 (05 Sep 2017 12:00) (18 - 26)  SpO2: 100% (05 Sep 2017 12:00) (97% - 100%)         REVIEW OF SYSTEMS: Limit or unable to obtain secondary to patient's poor mental status.        On Neurological Examination:    Mental Status - Patient is alert, awake,       Follow simple commands      Speech -   Fluent      Cranial Nerves - Pupils 3 mm equal and reactive to light,   extraocular eye movements intact.   smile symmetric  intact bilateral NLF    Motor Exam -     positive movement of all 4 extremities    Muscle tone - is normal all over.  No asymmetry is seen.      Sensory    Bilateral intact to light touch    Gait -  normal  ataxia     GENERAL Exam: Nontoxic , No Acute Distress   	  HEENT:  normocephalic, atraumatic  		  LUNGS:   Decreased bilaterally  	  HEART: Normal S1S2   No murmur RRR        	  GI/ ABDOMEN:  Soft  Non tender    EXTREMITIES:   No Edema  No Clubbing  No Cyanosis No Edema    MUSCULOSKELETAL: Normal Range of Motion  	   SKIN: Normal  No Ecchymosis               LABS:  CBC Full  -  ( 05 Sep 2017 06:43 )  WBC Count : 17.2 K/uL  Hemoglobin : 13.6 g/dL  Hematocrit : 43.4 %  Platelet Count - Automated : 316 K/uL  Mean Cell Volume : 94.6 fl  Mean Cell Hemoglobin : 29.7 pg  Mean Cell Hemoglobin Concentration : 31.4 gm/dL  Auto Neutrophil # : x  Auto Lymphocyte # : x  Auto Monocyte # : x  Auto Eosinophil # : x  Auto Basophil # : x  Auto Neutrophil % : x  Auto Lymphocyte % : x  Auto Monocyte % : x  Auto Eosinophil % : x  Auto Basophil % : x    Urinalysis Basic - ( 03 Sep 2017 20:56 )    Color: Yellow / Appearance: x / S.020 / pH: x  Gluc: x / Ketone: Negative  / Bili: Negative / Urobili: Negative mg/dL   Blood: x / Protein: 30 mg/dL / Nitrite: Negative   Leuk Esterase: Trace / RBC: 25-50 /HPF / WBC 3-5   Sq Epi: x / Non Sq Epi: Moderate / Bacteria: [facility]          144  |  111<H>  |  28<H>  ----------------------------<  87  3.8   |  22  |  2.30<H>    Ca    8.0<L>      05 Sep 2017 06:43      Hemoglobin A1C:       Vitamin B12         RADIOLOGY  ANALYSIS AND PLAN:  An 88-year-old with episode of changes in mental status and history of dementia.  1.	For changes in mental status, most likely toxic metabolic encephalopathy, which is multifactorial, possibly secondary to underlying viral-type syndrome along with hypernatremia and acute kidney injury.  2.	Recommend IV fluid hydration and monitor renal function.  3.	Recommend antibiotics as needed.  4.	Monitor patient's electrolytes.  5.	aspiration  precautions   6.	speech and swallow   7.	PT eval if possible  8.	renal function improving   9.	monitor oral intake   10.	For history of dementia, for now we will recommend supportive therapy.  I spoke with daughter Valery at [phone] 17.  She is aware that in the hospital setting, patient may become more disoriented.  He does have a history of sundowning and if necessary she will come stay with him.  We will continue to follow.      30 minutes spent on total encounter; more than 50% of the visit was spent counseling and/or coordinating care by the attending physician.

## 2017-09-05 NOTE — PROGRESS NOTE ADULT - SUBJECTIVE AND OBJECTIVE BOX
Patient is a 88y old  Male who presents with a chief complaint of lethargy/AMS (01 Sep 2017 11:05)      Patient seen in follow up for BRAYDEN, Hypoernatremia. Improving sodium levels. Stable renal function.     PAST MEDICAL HISTORY:  Dementia with behavioral disturbance  No pertinent past medical history      MEDICATIONS  (STANDING):  heparin  Injectable 5000 Unit(s) SubCutaneous every 12 hours  risperiDONE   Tablet 0.25 milliGRAM(s) Oral at bedtime  dextrose 5%. 1000 milliLiter(s) (50 mL/Hr) IV Continuous <Continuous>  piperacillin/tazobactam IVPB. 3.375 Gram(s) IV Intermittent every 12 hours  ALBUTerol    0.083% 2.5 milliGRAM(s) Nebulizer every 12 hours    MEDICATIONS  (PRN):  acetaminophen    Suspension. 650 milliGRAM(s) Oral every 8 hours PRN Moderate Pain (4 - 6)  acetaminophen  Suppository 650 milliGRAM(s) Rectal every 6 hours PRN For Temp greater than 38 C (100.4 F)  lidocaine 5% Ointment 1 Application(s) Topical daily PRN to viral rash on Back    T(C): 37 (17 @ 07:54), Max: 38.9 (17 @ 19:30)  HR: 80 (17 @ 07:43) (60 - 121)  BP: 96/49 (17 @ 07:42) (89/57 - 136/70)  RR: 19 (17 @ 07:42)  SpO2: 99% (17 @ 07:43)  Wt(kg): --  I&O's Detail    04 Sep 2017 07:01  -  05 Sep 2017 07:00  --------------------------------------------------------  IN:    dextrose 5%.: 1000 mL    IV PiggyBack: 200 mL  Total IN: 1200 mL    OUT:  Total OUT: 0 mL    Total NET: 1200 mL      05 Sep 2017 07:01  -  05 Sep 2017 09:52  --------------------------------------------------------  IN:    dextrose 5%.: 50 mL  Total IN: 50 mL    OUT:  Total OUT: 0 mL    Total NET: 50 mL          PHYSICAL EXAM:  General: NAD  Respiratory: b/l air entry  Cardiovascular: S1 S2  Gastrointestinal: soft  Extremities:  no edema               LABORATORY:                        13.6   17.2  )-----------( 316      ( 05 Sep 2017 06:43 )             43.4         144  |  111<H>  |  28<H>  ----------------------------<  87  3.8   |  22  |  2.30<H>    Ca    8.0<L>      05 Sep 2017 06:43      Sodium, Serum: 144 mmol/L ( @ 06:43)  Sodium, Serum: 142 mmol/L ( @ 06:20)    Potassium, Serum: 3.8 mmol/L (:43)  Potassium, Serum: 3.8 mmol/L ( @ 06:20)    Hemoglobin: 13.6 g/dL ( @ 06:43)  Hemoglobin: 13.8 g/dL ( @ 06:20)  Hemoglobin: 13.6 g/dL ( @ 06:36)    Creatinine, Serum 2.30 ( @ 06:43)  Creatinine, Serum 2.23 ( @ 06:20)  Creatinine, Serum 2.50 ( @ 06:36)          Urinalysis Basic - ( 03 Sep 2017 20:56 )    Color: Yellow / Appearance: x / S.020 / pH: x  Gluc: x / Ketone: Negative  / Bili: Negative / Urobili: Negative mg/dL   Blood: x / Protein: 30 mg/dL / Nitrite: Negative   Leuk Esterase: Trace / RBC: 25-50 /HPF / WBC 3-5   Sq Epi: x / Non Sq Epi: Moderate / Bacteria: TNTC Patient is a 88y old  Male who presents with a chief complaint of lethargy/AMS (01 Sep 2017 11:05)      Patient seen in follow up for BRAYDEN, Hypoernatremia. Improving sodium levels. Stable renal function. NPO for aspiration risk.     PAST MEDICAL HISTORY:  Dementia with behavioral disturbance  No pertinent past medical history      MEDICATIONS  (STANDING):  heparin  Injectable 5000 Unit(s) SubCutaneous every 12 hours  risperiDONE   Tablet 0.25 milliGRAM(s) Oral at bedtime  dextrose 5%. 1000 milliLiter(s) (50 mL/Hr) IV Continuous <Continuous>  piperacillin/tazobactam IVPB. 3.375 Gram(s) IV Intermittent every 12 hours  ALBUTerol    0.083% 2.5 milliGRAM(s) Nebulizer every 12 hours    MEDICATIONS  (PRN):  acetaminophen    Suspension. 650 milliGRAM(s) Oral every 8 hours PRN Moderate Pain (4 - 6)  acetaminophen  Suppository 650 milliGRAM(s) Rectal every 6 hours PRN For Temp greater than 38 C (100.4 F)  lidocaine 5% Ointment 1 Application(s) Topical daily PRN to viral rash on Back    T(C): 37 (17 @ 07:54), Max: 38.9 (17 @ 19:30)  HR: 80 (17 @ 07:43) (60 - 121)  BP: 96/49 (17 @ 07:42) (89/57 - 136/70)  RR: 19 (17 @ 07:42)  SpO2: 99% (17 @ 07:43)  Wt(kg): --  I&O's Detail    04 Sep 2017 07:  -  05 Sep 2017 07:00  --------------------------------------------------------  IN:    dextrose 5%.: 1000 mL    IV PiggyBack: 200 mL  Total IN: 1200 mL    OUT:  Total OUT: 0 mL    Total NET: 1200 mL      05 Sep 2017 07:  -  05 Sep 2017 09:52  --------------------------------------------------------  IN:    dextrose 5%.: 50 mL  Total IN: 50 mL    OUT:  Total OUT: 0 mL    Total NET: 50 mL          PHYSICAL EXAM:  General: NAD  Respiratory: b/l air entry  Cardiovascular: S1 S2  Gastrointestinal: soft  Extremities:  no edema               LABORATORY:                        13.6   17.2  )-----------( 316      ( 05 Sep 2017 06:43 )             43.4         144  |  111<H>  |  28<H>  ----------------------------<  87  3.8   |  22  |  2.30<H>    Ca    8.0<L>      05 Sep 2017 06:43      Sodium, Serum: 144 mmol/L ( @ 06:43)  Sodium, Serum: 142 mmol/L ( @ 06:20)    Potassium, Serum: 3.8 mmol/L (:43)  Potassium, Serum: 3.8 mmol/L ( @ 06:20)    Hemoglobin: 13.6 g/dL ( 06:43)  Hemoglobin: 13.8 g/dL ( @ 06:20)  Hemoglobin: 13.6 g/dL ( @ 06:36)    Creatinine, Serum 2.30 ( @ 06:43)  Creatinine, Serum 2.23 ( @ 06:20)  Creatinine, Serum 2.50 ( @ 06:36)          Urinalysis Basic - ( 03 Sep 2017 20:56 )    Color: Yellow / Appearance: x / S.020 / pH: x  Gluc: x / Ketone: Negative  / Bili: Negative / Urobili: Negative mg/dL   Blood: x / Protein: 30 mg/dL / Nitrite: Negative   Leuk Esterase: Trace / RBC: 25-50 /HPF / WBC 3-5   Sq Epi: x / Non Sq Epi: Moderate / Bacteria: TNTC

## 2017-09-05 NOTE — PROGRESS NOTE ADULT - ASSESSMENT
·	Hypernatremia: Secondary to decreased free water intake  ·	BRAYDEN: Prerenal azotemia, R/o retention    Continue IVF D5W. Increase rate to 75cc/hr. Will follow electrolytes and renal function trend. Encourage PO intake as tolerated. Labs as ordered. Avoid nephrotoxic meds as possible. Avoid ACEI, ARB and NSAIDS. Monitor input and output. Overall prognosis poor. ·	Hypernatremia: Secondary to decreased free water intake  ·	BRAYDEN, ? CKD: Prerenal azotemia  ·	Aspiration pneumonia    Continue IVF D5W. Increase rate to 75cc/hr. Will follow electrolytes and renal function trend. Currently NPO. Aspiration precautions. Labs as ordered. Avoid nephrotoxic meds as possible. Avoid ACEI, ARB and NSAIDS. Monitor input and output. Overall prognosis poor.

## 2017-09-06 LAB
-  AMIKACIN: SIGNIFICANT CHANGE UP
-  AMPICILLIN/SULBACTAM: SIGNIFICANT CHANGE UP
-  AMPICILLIN: SIGNIFICANT CHANGE UP
-  AZTREONAM: SIGNIFICANT CHANGE UP
-  CEFAZOLIN: SIGNIFICANT CHANGE UP
-  CEFEPIME: SIGNIFICANT CHANGE UP
-  CEFOXITIN: SIGNIFICANT CHANGE UP
-  CEFTAZIDIME: SIGNIFICANT CHANGE UP
-  CEFTRIAXONE: SIGNIFICANT CHANGE UP
-  CIPROFLOXACIN: SIGNIFICANT CHANGE UP
-  ERTAPENEM: SIGNIFICANT CHANGE UP
-  GENTAMICIN: SIGNIFICANT CHANGE UP
-  IMIPENEM: SIGNIFICANT CHANGE UP
-  LEVOFLOXACIN: SIGNIFICANT CHANGE UP
-  MEROPENEM: SIGNIFICANT CHANGE UP
-  PIPERACILLIN/TAZOBACTAM: SIGNIFICANT CHANGE UP
-  TOBRAMYCIN: SIGNIFICANT CHANGE UP
-  TRIMETHOPRIM/SULFAMETHOXAZOLE: SIGNIFICANT CHANGE UP
ANION GAP SERPL CALC-SCNC: 11 MMOL/L — SIGNIFICANT CHANGE UP (ref 5–17)
BUN SERPL-MCNC: 21 MG/DL — SIGNIFICANT CHANGE UP (ref 7–23)
CALCIUM SERPL-MCNC: 7.9 MG/DL — LOW (ref 8.4–10.5)
CHLORIDE SERPL-SCNC: 106 MMOL/L — SIGNIFICANT CHANGE UP (ref 96–108)
CO2 SERPL-SCNC: 21 MMOL/L — LOW (ref 22–31)
CREAT SERPL-MCNC: 2.14 MG/DL — HIGH (ref 0.5–1.3)
CULTURE RESULTS: SIGNIFICANT CHANGE UP
GLUCOSE SERPL-MCNC: 115 MG/DL — HIGH (ref 70–99)
HCT VFR BLD CALC: 38.8 % — LOW (ref 39–50)
HGB BLD-MCNC: 12.6 G/DL — LOW (ref 13–17)
MCHC RBC-ENTMCNC: 30.6 PG — SIGNIFICANT CHANGE UP (ref 27–34)
MCHC RBC-ENTMCNC: 32.4 GM/DL — SIGNIFICANT CHANGE UP (ref 32–36)
MCV RBC AUTO: 94.4 FL — SIGNIFICANT CHANGE UP (ref 80–100)
METHOD TYPE: SIGNIFICANT CHANGE UP
ORGANISM # SPEC MICROSCOPIC CNT: SIGNIFICANT CHANGE UP
ORGANISM # SPEC MICROSCOPIC CNT: SIGNIFICANT CHANGE UP
PLATELET # BLD AUTO: 302 K/UL — SIGNIFICANT CHANGE UP (ref 150–400)
POTASSIUM SERPL-MCNC: 3.8 MMOL/L — SIGNIFICANT CHANGE UP (ref 3.5–5.3)
POTASSIUM SERPL-SCNC: 3.8 MMOL/L — SIGNIFICANT CHANGE UP (ref 3.5–5.3)
RBC # BLD: 4.11 M/UL — LOW (ref 4.2–5.8)
RBC # FLD: 15.4 % — HIGH (ref 10.3–14.5)
SODIUM SERPL-SCNC: 138 MMOL/L — SIGNIFICANT CHANGE UP (ref 135–145)
SPECIMEN SOURCE: SIGNIFICANT CHANGE UP
WBC # BLD: 15.5 K/UL — HIGH (ref 3.8–10.5)
WBC # FLD AUTO: 15.5 K/UL — HIGH (ref 3.8–10.5)

## 2017-09-06 PROCEDURE — 99233 SBSQ HOSP IP/OBS HIGH 50: CPT

## 2017-09-06 RX ORDER — HYDROCORTISONE 1 %
1 OINTMENT (GRAM) TOPICAL
Qty: 0 | Refills: 0 | Status: DISCONTINUED | OUTPATIENT
Start: 2017-09-06 | End: 2017-09-11

## 2017-09-06 RX ADMIN — ALBUTEROL 2.5 MILLIGRAM(S): 90 AEROSOL, METERED ORAL at 07:41

## 2017-09-06 RX ADMIN — PIPERACILLIN AND TAZOBACTAM 25 GRAM(S): 4; .5 INJECTION, POWDER, LYOPHILIZED, FOR SOLUTION INTRAVENOUS at 05:41

## 2017-09-06 RX ADMIN — SODIUM CHLORIDE 75 MILLILITER(S): 9 INJECTION, SOLUTION INTRAVENOUS at 18:11

## 2017-09-06 RX ADMIN — RISPERIDONE 0.25 MILLIGRAM(S): 4 TABLET ORAL at 22:10

## 2017-09-06 RX ADMIN — HEPARIN SODIUM 5000 UNIT(S): 5000 INJECTION INTRAVENOUS; SUBCUTANEOUS at 10:55

## 2017-09-06 RX ADMIN — HEPARIN SODIUM 5000 UNIT(S): 5000 INJECTION INTRAVENOUS; SUBCUTANEOUS at 22:10

## 2017-09-06 RX ADMIN — ALBUTEROL 2.5 MILLIGRAM(S): 90 AEROSOL, METERED ORAL at 21:06

## 2017-09-06 RX ADMIN — PIPERACILLIN AND TAZOBACTAM 25 GRAM(S): 4; .5 INJECTION, POWDER, LYOPHILIZED, FOR SOLUTION INTRAVENOUS at 18:11

## 2017-09-06 RX ADMIN — Medication 1 APPLICATION(S): at 18:12

## 2017-09-06 NOTE — PROGRESS NOTE ADULT - SUBJECTIVE AND OBJECTIVE BOX
Date/Time Patient Seen:  		  Referring MD:   Data Reviewed	       Patient is a 88y old  Male who presents with a chief complaint of lethargy/AMS (01 Sep 2017 11:05)  in bed  seen and examined  vs and meds reviewed  on ABX  ID follow up noted        Subjective/HPI     PAST MEDICAL & SURGICAL HISTORY:  Dementia with behavioral disturbance  No pertinent past medical history  No significant past surgical history        Medication list         MEDICATIONS  (STANDING):  heparin  Injectable 5000 Unit(s) SubCutaneous every 12 hours  risperiDONE   Tablet 0.25 milliGRAM(s) Oral at bedtime  dextrose 5%. 1000 milliLiter(s) (75 mL/Hr) IV Continuous <Continuous>  piperacillin/tazobactam IVPB. 3.375 Gram(s) IV Intermittent every 12 hours  ALBUTerol    0.083% 2.5 milliGRAM(s) Nebulizer every 12 hours    MEDICATIONS  (PRN):  acetaminophen    Suspension. 650 milliGRAM(s) Oral every 8 hours PRN Moderate Pain (4 - 6)  acetaminophen  Suppository 650 milliGRAM(s) Rectal every 6 hours PRN For Temp greater than 38 C (100.4 F)  lidocaine 5% Ointment 1 Application(s) Topical daily PRN to viral rash on Back         Vitals log        ICU Vital Signs Last 24 Hrs  T(C): 36.6 (06 Sep 2017 04:01), Max: 37 (05 Sep 2017 07:54)  T(F): 97.9 (06 Sep 2017 04:01), Max: 98.6 (05 Sep 2017 07:54)  HR: 87 (06 Sep 2017 06:03) (69 - 87)  BP: 107/51 (06 Sep 2017 06:03) (96/49 - 132/61)  BP(mean): 67 (06 Sep 2017 06:03) (65 - 82)  ABP: --  ABP(mean): --  RR: 26 (06 Sep 2017 06:03) (17 - 26)  SpO2: 100% (06 Sep 2017 06:03) (98% - 100%)           Input and Output:  I&O's Detail    05 Sep 2017 07:01  -  06 Sep 2017 07:00  --------------------------------------------------------  IN:    dextrose 5%.: 1400 mL    IV PiggyBack: 50 mL  Total IN: 1450 mL    OUT:    Voided: 150 mL  Total OUT: 150 mL    Total NET: 1300 mL          Lab Data                        13.6   17.2  )-----------( 316      ( 05 Sep 2017 06:43 )             43.4     09-05    144  |  111<H>  |  28<H>  ----------------------------<  87  3.8   |  22  |  2.30<H>    Ca    8.0<L>      05 Sep 2017 06:43              Review of Systems	      Objective     Physical Examination    head at  heart - s1s2  lungs - dec BS  abd - soft      Pertinent Lab findings & Imaging      Renea:  NO   Adequate UO     I&O's Detail    05 Sep 2017 07:01  -  06 Sep 2017 07:00  --------------------------------------------------------  IN:    dextrose 5%.: 1400 mL    IV PiggyBack: 50 mL  Total IN: 1450 mL    OUT:    Voided: 150 mL  Total OUT: 150 mL    Total NET: 1300 mL               Discussed with:     Cultures:	        Radiology

## 2017-09-06 NOTE — PROGRESS NOTE ADULT - SUBJECTIVE AND OBJECTIVE BOX
AZUL VILLA is a 88yMale , patient examined and chart reviewed.     INTERVAL HPI/ OVERNIGHT EVENTS:  No events. Afebrile.    Past Medical History--  PAST MEDICAL & SURGICAL HISTORY:  Dementia with behavioral disturbance  No significant past surgical history      For details regarding the patient's social history, family history, and other miscellaneous elements, please refer the initial infectious diseases consultation and/or the admitting history and physical examination for this admission.    ROS:  Unable to obtain due to : Confused    Current inpatient medications :    ANTIBIOTICS/RELEVANT:  piperacillin/tazobactam IVPB. 3.375 Gram(s) IV Intermittent every 12 hours      heparin  Injectable 5000 Unit(s) SubCutaneous every 12 hours  risperiDONE   Tablet 0.25 milliGRAM(s) Oral at bedtime  acetaminophen    Suspension. 650 milliGRAM(s) Oral every 8 hours PRN  dextrose 5%. 1000 milliLiter(s) IV Continuous <Continuous>  acetaminophen  Suppository 650 milliGRAM(s) Rectal every 6 hours PRN  ALBUTerol    0.083% 2.5 milliGRAM(s) Nebulizer every 12 hours  lidocaine 5% Ointment 1 Application(s) Topical daily PRN  hydrocortisone 1% Cream 1 Application(s) Topical two times a day      Objective:    09-05 @ 07:01  -  09-06 @ 07:00  --------------------------------------------------------  IN: 1450 mL / OUT: 150 mL / NET: 1300 mL    09-06 @ 07:01  -  09-06 @ 17:11  --------------------------------------------------------  IN: 595 mL / OUT: 0 mL / NET: 595 mL      T(C): 36.7 (09-06-17 @ 16:00), Max: 36.9 (09-05-17 @ 23:48)  HR: 87 (09-06-17 @ 14:38) (74 - 96)  BP: 120/55 (09-06-17 @ 14:38) (102/57 - 132/61)  RR: 18 (09-06-17 @ 14:38) (18 - 27)  SpO2: 100% (09-06-17 @ 14:38) (96% - 100%)  Wt(kg): --      Physical Exam:  GEN: No distress  HEENT: normocephalic and atraumatic. EOMI. JOSE ALFREDO. Moist mucosa. Clear Posterior pharynx.  NECK: Supple. No carotid bruits.  No lymphadenopathy or thyromegaly.  LUNGS: Decreased to auscultation.  HEART: Regular rate and rhythm without murmur.  ABDOMEN: Soft, nontender, and nondistended.  Positive bowel sounds.    EXTREMITIES: Without any cyanosis, clubbing, rash, lesions or edema.  NEUROLOGIC: confused No focal neurological deficits   SKIN: Rash better      LABS:                        12.6   15.5  )-----------( 302      ( 06 Sep 2017 11:05 )             38.8       09-06    138  |  106  |  21  ----------------------------<  115<H>  3.8   |  21<L>  |  2.14<H>    Ca    7.9<L>      06 Sep 2017 11:05        RECENT CULTURES:  Culture - Sputum . (09.04.17 @ 13:53)    -  Ampicillin: R >16    -  Aztreonam: S <=4    -  Cefazolin: R >16    -  Cefoxitin: S <=4    -  Ceftazidime: S <=1    -  Meropenem: S <=1    -  Ceftriaxone: S <=1    -  Ciprofloxacin: S <=0.5    -  Ertapenem: S <=0.5    -  Gentamicin: S <=1    -  Piperacillin/Tazobactam: R >64    -  Imipenem: S <=1    -  Tobramycin: S <=2    -  Amikacin: S <=8    -  Ampicillin/Sulbactam: R >16/8    -  Cefepime: S <=2    -  Levofloxacin: S <=1    -  Trimethoprim/Sulfamethoxazole: S <=0.5/9.5    Gram Stain:   No polymorphonuclear leukocytes per low power field  No Squamous epithelial cells per low power field  Moderate Yeast like cells  Rare Gram Negative Rods    Specimen Source: .Sputum Sputum trap    Culture Results:   Numerous Klebsiella pneumoniae  Normal Respiratory Yumiko present    Organism Identification: Klebsiella pneumoniae    Organism: Klebsiella pneumoniae    Method Type: DOMINGUEZ    Culture - Blood (09.04.17 @ 12:44)    Specimen Source: .Blood    Culture Results:   No growth to date.    Culture - Urine (09.04.17 @ 11:35)    Specimen Source: .Urine Catheterized    Culture Results:   No growth    RADIOLOGY & ADDITIONAL STUDIES:    EXAM:  CHEST-PORTABLE IMMEDIATE                          PROCEDURE DATE:  09/03/2017        INTERPRETATION:  CLINICAL INFORMATION:Fever    TECHNIQUE: AP chest film. Comparison is made to 9/1/2017    FINDINGS: There are faint patchy opacities at the lung bases.  Heart size   is within normal limits, however the aorta is ectatic. There are   degenerative changes of the spine.    IMPRESSION: New faint patchy opacities at the lung bases compatible with   atelectasis and/or pneumonia.      Assessment :   88 year old male Dementia admitted with mental status change and BRAYDEN   Sp severe dehydration with hypernatremia   Enterovirus/Rhinovirus infection   Rash likely sec viral illness  New fever likely aspiration pneumonia  CXR noted  Seen by speech - diet modified  BRAYDEN improving    Plan :   Zosyn for now day 4  Trend wbc  IVF  Monitor renal fx  DNRDNI              I have discussed the above plan of care with patient/ family in detail. They expressed understanding of the the treatment plan . Risks, benefits and alternatives discussed in detail. I have asked if they have any questions or concerns and appropriately addressed them to the best of my ability .      Critical care time greater then 35 minutes reviewing notes, labs data/ imaging , discussion with multidisciplinary team.    Thank you for allowing me to participate in care of your patient .        Rik Erwin MD  Infectious Disease  [phone]

## 2017-09-06 NOTE — PROGRESS NOTE ADULT - SUBJECTIVE AND OBJECTIVE BOX
Patient is a 88y old  Male who presents with a chief complaint of lethargy/AMS (01 Sep 2017 11:05)      INTERVAL HPI/OVERNIGHT EVENTS:  Pt is seen and examined.    Pain Location & Control:     MEDICATIONS  (STANDING):  heparin  Injectable 5000 Unit(s) SubCutaneous every 12 hours  risperiDONE   Tablet 0.25 milliGRAM(s) Oral at bedtime  dextrose 5%. 1000 milliLiter(s) (75 mL/Hr) IV Continuous <Continuous>  piperacillin/tazobactam IVPB. 3.375 Gram(s) IV Intermittent every 12 hours  ALBUTerol    0.083% 2.5 milliGRAM(s) Nebulizer every 12 hours    MEDICATIONS  (PRN):  acetaminophen    Suspension. 650 milliGRAM(s) Oral every 8 hours PRN Moderate Pain (4 - 6)  acetaminophen  Suppository 650 milliGRAM(s) Rectal every 6 hours PRN For Temp greater than 38 C (100.4 F)  lidocaine 5% Ointment 1 Application(s) Topical daily PRN to viral rash on Back      Allergies    No Known Allergies    Intolerances            Vital Signs Last 24 Hrs  T(C): 36.4 (06 Sep 2017 08:36), Max: 36.9 (05 Sep 2017 23:48)  T(F): 97.6 (06 Sep 2017 08:36), Max: 98.5 (05 Sep 2017 23:48)  HR: 96 (06 Sep 2017 08:00) (69 - 96)  BP: 128/70 (06 Sep 2017 08:00) (99/57 - 132/61)  BP(mean): 85 (06 Sep 2017 08:00) (67 - 85)  RR: 27 (06 Sep 2017 08:00) (17 - 27)  SpO2: 98% (06 Sep 2017 08:00) (96% - 100%)        LABS:      Ca    8.0        05 Sep 2017 06:43          CAPILLARY BLOOD GLUCOSE  116 (05 Sep 2017 12:00)            Cultures      RADIOLOGY & ADDITIONAL TESTS:    Imaging Personally Reviewed:  [ ] YES  [ ] NO    Consultant(s) Notes Reviewed:  [ ] YES  [ ] NO    Care Discussed with Consultants/Other Providers [ ] YES  [ ] NO Patient is a 88y old  Male who presents with a chief complaint of lethargy/AMS (01 Sep 2017 11:05)      INTERVAL HPI/OVERNIGHT EVENTS:  Pt is seen and examined.  +rashes on chest wall and back.    Pain Location & Control:     MEDICATIONS  (STANDING):  heparin  Injectable 5000 Unit(s) SubCutaneous every 12 hours  risperiDONE   Tablet 0.25 milliGRAM(s) Oral at bedtime  dextrose 5%. 1000 milliLiter(s) (75 mL/Hr) IV Continuous <Continuous>  piperacillin/tazobactam IVPB. 3.375 Gram(s) IV Intermittent every 12 hours  ALBUTerol    0.083% 2.5 milliGRAM(s) Nebulizer every 12 hours    MEDICATIONS  (PRN):  acetaminophen    Suspension. 650 milliGRAM(s) Oral every 8 hours PRN Moderate Pain (4 - 6)  acetaminophen  Suppository 650 milliGRAM(s) Rectal every 6 hours PRN For Temp greater than 38 C (100.4 F)  lidocaine 5% Ointment 1 Application(s) Topical daily PRN to viral rash on Back      Allergies    No Known Allergies    Intolerances            Vital Signs Last 24 Hrs  T(C): 36.4 (06 Sep 2017 08:36), Max: 36.9 (05 Sep 2017 23:48)  T(F): 97.6 (06 Sep 2017 08:36), Max: 98.5 (05 Sep 2017 23:48)  HR: 96 (06 Sep 2017 08:00) (69 - 96)  BP: 128/70 (06 Sep 2017 08:00) (99/57 - 132/61)  BP(mean): 85 (06 Sep 2017 08:00) (67 - 85)  RR: 27 (06 Sep 2017 08:00) (17 - 27)  SpO2: 98% (06 Sep 2017 08:00) (96% - 100%)        LABS:      Ca    8.0        05 Sep 2017 06:43          CAPILLARY BLOOD GLUCOSE  116 (05 Sep 2017 12:00)          RADIOLOGY & ADDITIONAL TESTS:    Imaging Personally Reviewed:  [ ] YES  [ ] NO    Consultant(s) Notes Reviewed:  [ x] YES  [ ] NO    Care Discussed with Consultants/Other Providers [ x] YES  [ ] NO Tom

## 2017-09-06 NOTE — PROGRESS NOTE ADULT - SUBJECTIVE AND OBJECTIVE BOX
Neurology follow up note    AZUL VILLA88yMale      Interval History:    Patient feels ok no new complaints.    MEDICATIONS    heparin  Injectable 5000 Unit(s) SubCutaneous every 12 hours  risperiDONE   Tablet 0.25 milliGRAM(s) Oral at bedtime  acetaminophen    Suspension. 650 milliGRAM(s) Oral every 8 hours PRN  dextrose 5%. 1000 milliLiter(s) IV Continuous <Continuous>  acetaminophen  Suppository 650 milliGRAM(s) Rectal every 6 hours PRN  piperacillin/tazobactam IVPB. 3.375 Gram(s) IV Intermittent every 12 hours  ALBUTerol    0.083% 2.5 milliGRAM(s) Nebulizer every 12 hours  lidocaine 5% Ointment 1 Application(s) Topical daily PRN  hydrocortisone 1% Cream 1 Application(s) Topical two times a day      Allergies    No Known Allergies    Intolerances            Vital Signs Last 24 Hrs  T(C): 36.6 (06 Sep 2017 12:27), Max: 36.9 (05 Sep 2017 23:48)  T(F): 97.8 (06 Sep 2017 12:27), Max: 98.5 (05 Sep 2017 23:48)  HR: 85 (06 Sep 2017 12:00) (74 - 96)  BP: 118/70 (06 Sep 2017 12:00) (102/57 - 132/61)  BP(mean): 84 (06 Sep 2017 12:00) (67 - 85)  RR: 23 (06 Sep 2017 12:00) (17 - 27)  SpO2: 100% (06 Sep 2017 12:00) (96% - 100%)          REVIEW OF SYSTEMS: Limit or unable to obtain secondary to patient's poor mental status.        On Neurological Examination:    Mental Status - Patient is alert, awake,       Follow simple commands      Speech -  say few words     Cranial Nerves - Pupils 3 mm equal and reactive to light,   extraocular eye movements intact.   smile symmetric  intact bilateral NLF    Motor Exam -     positive movement of all 4 extremities    Muscle tone - is normal all over.  No asymmetry is seen.      Sensory    Bilateral intact to light touch    Gait -  normal  ataxia     GENERAL Exam: Nontoxic , No Acute Distress   	  HEENT:  normocephalic, atraumatic  		  LUNGS:   Decreased bilaterally  	  HEART: Normal S1S2   No murmur RRR        	  GI/ ABDOMEN:  Soft  Non tender    EXTREMITIES:   No Edema  No Clubbing  No Cyanosis No Edema    MUSCULOSKELETAL: Normal Range of Motion  	   SKIN: Normal  No Ecchymosis        LABS:  CBC Full  -  ( 06 Sep 2017 11:05 )  WBC Count : 15.5 K/uL  Hemoglobin : 12.6 g/dL  Hematocrit : 38.8 %  Platelet Count - Automated : 302 K/uL  Mean Cell Volume : 94.4 fl  Mean Cell Hemoglobin : 30.6 pg  Mean Cell Hemoglobin Concentration : 32.4 gm/dL  Auto Neutrophil # : x  Auto Lymphocyte # : x  Auto Monocyte # : x  Auto Eosinophil # : x  Auto Basophil # : x  Auto Neutrophil % : x  Auto Lymphocyte % : x  Auto Monocyte % : x  Auto Eosinophil % : x  Auto Basophil % : x      09-06    138  |  106  |  21  ----------------------------<  115<H>  3.8   |  21<L>  |  2.14<H>    Ca    7.9<L>      06 Sep 2017 11:05      Hemoglobin A1C:       Vitamin B12         RADIOLOGY      ANALYSIS AND PLAN:  An 88-year-old with episode of changes in mental status and history of dementia.  1.	For changes in mental status, most likely toxic metabolic encephalopathy, which is multifactorial, possibly secondary to underlying viral-type syndrome along with hypernatremia and acute kidney injury.  2.	Recommend IV fluid hydration and monitor renal function.  3.	Recommend antibiotics as needed.  4.	Monitor patient's electrolytes.  5.	aspiration  precautions   6.	monitor oral intake   7.	PT eval if possible  8.	renal function improving   9.	monitor oral intake   10.	For history of dementia, for now we will recommend supportive therapy.  I spoke with daughter Valery at [phone] 9/5/17.  no response today  She is aware that in the hospital setting, patient may become more disoriented.  He does have a history of sundowning and if necessary she will come stay with him.  We will continue to follow.      30 minutes spent on total encounter; more than 50% of the visit was spent counseling and/or coordinating care by the attending physician.

## 2017-09-06 NOTE — PROGRESS NOTE ADULT - PROBLEM SELECTOR PLAN 1
on ABX  ID following  asp prec  oral hygiene  skin care  HOB elevation  at high risk for recurrent aspiration  monitor sat  keep sat > 90 pct  chest pt  NEBS prn

## 2017-09-06 NOTE — PROGRESS NOTE ADULT - SUBJECTIVE AND OBJECTIVE BOX
Patient is a 88y old  Male who presents with a chief complaint of lethargy/AMS (01 Sep 2017 11:05)      Patient seen in follow up for BRAYDEN, Hypoernatremia. Improving sodium levels. Stable renal function. NPO for aspiration risk.     PAST MEDICAL HISTORY:  Dementia with behavioral disturbance  No pertinent past medical history      MEDICATIONS  (STANDING):  heparin  Injectable 5000 Unit(s) SubCutaneous every 12 hours  risperiDONE   Tablet 0.25 milliGRAM(s) Oral at bedtime  dextrose 5%. 1000 milliLiter(s) (75 mL/Hr) IV Continuous <Continuous>  piperacillin/tazobactam IVPB. 3.375 Gram(s) IV Intermittent every 12 hours  ALBUTerol    0.083% 2.5 milliGRAM(s) Nebulizer every 12 hours  hydrocortisone 1% Cream 1 Application(s) Topical two times a day    MEDICATIONS  (PRN):  acetaminophen    Suspension. 650 milliGRAM(s) Oral every 8 hours PRN Moderate Pain (4 - 6)  acetaminophen  Suppository 650 milliGRAM(s) Rectal every 6 hours PRN For Temp greater than 38 C (100.4 F)  lidocaine 5% Ointment 1 Application(s) Topical daily PRN to viral rash on Back    T(C): 36.6 (09-06-17 @ 12:27), Max: 37.8 (09-04-17 @ 22:00)  HR: 85 (09-06-17 @ 12:00) (69 - 103)  BP: 118/70 (09-06-17 @ 12:00) (96/49 - 136/70)  RR: 23 (09-06-17 @ 12:00)  SpO2: 100% (09-06-17 @ 12:00)  Wt(kg): --  I&O's Detail    05 Sep 2017 07:01  -  06 Sep 2017 07:00  --------------------------------------------------------  IN:    dextrose 5%.: 1400 mL    IV PiggyBack: 50 mL  Total IN: 1450 mL    OUT:    Voided: 150 mL  Total OUT: 150 mL    Total NET: 1300 mL      06 Sep 2017 07:01  -  06 Sep 2017 14:18  --------------------------------------------------------  IN:    dextrose 5%.: 375 mL    Oral Fluid: 220 mL  Total IN: 595 mL    OUT:  Total OUT: 0 mL    Total NET: 595 mL            PHYSICAL EXAM:  General: NAD  Respiratory: b/l air entry  Cardiovascular: S1 S2  Gastrointestinal: soft  Extremities:  no edema               LABORATORY:                        12.6   15.5  )-----------( 302      ( 06 Sep 2017 11:05 )             38.8     09-06    138  |  106  |  21  ----------------------------<  115<H>  3.8   |  21<L>  |  2.14<H>    Ca    7.9<L>      06 Sep 2017 11:05      Sodium, Serum: 138 mmol/L (09-06 @ 11:05)  Sodium, Serum: 144 mmol/L (09-05 @ 06:43)    Potassium, Serum: 3.8 mmol/L (09-06 @ 11:05)  Potassium, Serum: 3.8 mmol/L (09-05 @ 06:43)    Hemoglobin: 12.6 g/dL (09-06 @ 11:05)  Hemoglobin: 13.6 g/dL (09-05 @ 06:43)  Hemoglobin: 13.8 g/dL (09-04 @ 06:20)    Creatinine, Serum 2.14 (09-06 @ 11:05)  Creatinine, Serum 2.30 (09-05 @ 06:43)  Creatinine, Serum 2.23 (09-04 @ 06:20)

## 2017-09-06 NOTE — PROGRESS NOTE ADULT - ASSESSMENT
88 male with    1. acute encephalopathy :  metabolic encephalopathy- c/w ivf. hyponatremia improved.    2. dementia with behavioral disturbance: risperdal prn. f/up neuro recs    3. BRAYDEN/ATN due to dehydration: renal dose meds and avoid nephrotoxics. monitor intake and output on IVF. f/up renal recs    4. aspiration pneumonia- bilateral lower lobe-  started on iv zosyn.    5. poor dentition/aspiration risk: swallow eval. NPO for now    6. deconditioning: PT/Nutrition eval.    7. heparin SQ for dvt ppx    8. dispo: home with services when acute medical issues resolved.  poor prognosis.  DNR.   8.Skin rashes- likly due to viral infection.  speech and swallow revaluation. 88 male with    1. acute encephalopathy :  metabolic encephalopathy- c/w ivf. hyponatremia improved.    2. dementia with behavioral disturbance: risperdal prn. f/up neuro recs    3. BRAYDEN/ATN due to dehydration: renal dose meds and avoid nephrotoxics. monitor intake and output on IVF. f/up renal recs    4. aspiration pneumonia- bilateral lower lobe-  started on iv zosyn.    5. poor dentition/aspiration risk: swallow eval. NPO for now    6. deconditioning: PT/Nutrition eval.    7. heparin SQ for dvt ppx    8. dispo: home with services when acute medical issues resolved.  poor prognosis.  DNR.   8.Skin rashes- likly due to viral infection. will apply hydrocortisone cream locally.  . 88 male with    1. acute encephalopathy :  metabolic encephalopathy- c/w ivf. hyponatremia improved.    2. dementia with behavioral disturbance: risperdal prn. f/up neuro recs    3. BRAYDEN/ATN due to dehydration: renal dose meds and avoid nephrotoxics. monitor intake and output on IVF. f/up renal recs    4. aspiration pneumonia- bilateral lower lobe-  started on iv zosyn.    5. poor dentition/aspiration risk: swallow eval. NPO for now    6. deconditioning: PT/Nutrition eval.    7. heparin SQ for dvt ppx    8. dispo: rehab  when acute medical issues resolved.  poor prognosis.  DNR.   8.Skin rashes- likly due to viral infection. will apply hydrocortisone cream locally.  .

## 2017-09-06 NOTE — PROGRESS NOTE ADULT - ASSESSMENT
·	Hypernatremia: Secondary to decreased free water intake  ·	BRAYDEN, ? CKD: Prerenal azotemia  ·	Aspiration pneumonia    Continue IVF D5W. Improving renal function. Will follow electrolytes and renal function trend. Currently NPO. Aspiration precautions. Labs as ordered. Avoid nephrotoxic meds as possible. Avoid ACEI, ARB and NSAIDS. Monitor input and output. Overall prognosis poor.

## 2017-09-07 DIAGNOSIS — N18.4 CHRONIC KIDNEY DISEASE, STAGE 4 (SEVERE): ICD-10-CM

## 2017-09-07 LAB
ANION GAP SERPL CALC-SCNC: 11 MMOL/L — SIGNIFICANT CHANGE UP (ref 5–17)
BUN SERPL-MCNC: 18 MG/DL — SIGNIFICANT CHANGE UP (ref 7–23)
CALCIUM SERPL-MCNC: 7.9 MG/DL — LOW (ref 8.4–10.5)
CHLORIDE SERPL-SCNC: 109 MMOL/L — HIGH (ref 96–108)
CO2 SERPL-SCNC: 21 MMOL/L — LOW (ref 22–31)
CREAT SERPL-MCNC: 2.03 MG/DL — HIGH (ref 0.5–1.3)
GLUCOSE SERPL-MCNC: 102 MG/DL — HIGH (ref 70–99)
HCT VFR BLD CALC: 38 % — LOW (ref 39–50)
HGB BLD-MCNC: 12.4 G/DL — LOW (ref 13–17)
MCHC RBC-ENTMCNC: 30.5 PG — SIGNIFICANT CHANGE UP (ref 27–34)
MCHC RBC-ENTMCNC: 32.6 GM/DL — SIGNIFICANT CHANGE UP (ref 32–36)
MCV RBC AUTO: 93.5 FL — SIGNIFICANT CHANGE UP (ref 80–100)
PLATELET # BLD AUTO: 304 K/UL — SIGNIFICANT CHANGE UP (ref 150–400)
POTASSIUM SERPL-MCNC: 3.8 MMOL/L — SIGNIFICANT CHANGE UP (ref 3.5–5.3)
POTASSIUM SERPL-SCNC: 3.8 MMOL/L — SIGNIFICANT CHANGE UP (ref 3.5–5.3)
RBC # BLD: 4.07 M/UL — LOW (ref 4.2–5.8)
RBC # FLD: 15.5 % — HIGH (ref 10.3–14.5)
SODIUM SERPL-SCNC: 141 MMOL/L — SIGNIFICANT CHANGE UP (ref 135–145)
WBC # BLD: 10.9 K/UL — HIGH (ref 3.8–10.5)
WBC # FLD AUTO: 10.9 K/UL — HIGH (ref 3.8–10.5)

## 2017-09-07 PROCEDURE — 99233 SBSQ HOSP IP/OBS HIGH 50: CPT

## 2017-09-07 RX ORDER — LIDOCAINE 4 G/100G
1 CREAM TOPICAL DAILY
Qty: 0 | Refills: 0 | Status: DISCONTINUED | OUTPATIENT
Start: 2017-09-07 | End: 2017-09-11

## 2017-09-07 RX ADMIN — ALBUTEROL 2.5 MILLIGRAM(S): 90 AEROSOL, METERED ORAL at 21:08

## 2017-09-07 RX ADMIN — HEPARIN SODIUM 5000 UNIT(S): 5000 INJECTION INTRAVENOUS; SUBCUTANEOUS at 09:48

## 2017-09-07 RX ADMIN — PIPERACILLIN AND TAZOBACTAM 25 GRAM(S): 4; .5 INJECTION, POWDER, LYOPHILIZED, FOR SOLUTION INTRAVENOUS at 17:37

## 2017-09-07 RX ADMIN — RISPERIDONE 0.25 MILLIGRAM(S): 4 TABLET ORAL at 22:27

## 2017-09-07 RX ADMIN — LIDOCAINE 1 PATCH: 4 CREAM TOPICAL at 17:36

## 2017-09-07 RX ADMIN — PIPERACILLIN AND TAZOBACTAM 25 GRAM(S): 4; .5 INJECTION, POWDER, LYOPHILIZED, FOR SOLUTION INTRAVENOUS at 05:49

## 2017-09-07 RX ADMIN — ALBUTEROL 2.5 MILLIGRAM(S): 90 AEROSOL, METERED ORAL at 07:40

## 2017-09-07 RX ADMIN — HEPARIN SODIUM 5000 UNIT(S): 5000 INJECTION INTRAVENOUS; SUBCUTANEOUS at 22:26

## 2017-09-07 RX ADMIN — Medication 1 APPLICATION(S): at 05:50

## 2017-09-07 RX ADMIN — Medication 1 APPLICATION(S): at 17:38

## 2017-09-07 NOTE — PROGRESS NOTE ADULT - ASSESSMENT
·	Hypernatremia: Secondary to decreased free water intake  ·	BRAYDEN, ? CKD: Prerenal azotemia  ·	Aspiration pneumonia    Continue IVF D5W. Stable renal function. Encourage Po intake as tolerated. Will follow electrolytes and renal function trend. Aspiration precautions. Avoid nephrotoxic meds as possible. Avoid ACEI, ARB and NSAIDS. Monitor input and output. Overall prognosis poor.

## 2017-09-07 NOTE — PROGRESS NOTE ADULT - SUBJECTIVE AND OBJECTIVE BOX
Neurology follow up note    AZUL VILLA88yMale      Interval History:    Patient with anyi     MEDICATIONS    heparin  Injectable 5000 Unit(s) SubCutaneous every 12 hours  risperiDONE   Tablet 0.25 milliGRAM(s) Oral at bedtime  acetaminophen    Suspension. 650 milliGRAM(s) Oral every 8 hours PRN  dextrose 5%. 1000 milliLiter(s) IV Continuous <Continuous>  acetaminophen  Suppository 650 milliGRAM(s) Rectal every 6 hours PRN  piperacillin/tazobactam IVPB. 3.375 Gram(s) IV Intermittent every 12 hours  ALBUTerol    0.083% 2.5 milliGRAM(s) Nebulizer every 12 hours  lidocaine 5% Ointment 1 Application(s) Topical daily PRN  hydrocortisone 1% Cream 1 Application(s) Topical two times a day      Allergies    No Known Allergies    Intolerances            Vital Signs Last 24 Hrs  T(C): 36.3 (07 Sep 2017 14:20), Max: 36.9 (06 Sep 2017 20:05)  T(F): 97.3 (07 Sep 2017 14:20), Max: 98.5 (06 Sep 2017 20:05)  HR: 80 (07 Sep 2017 12:02) (80 - 111)  BP: 108/64 (07 Sep 2017 12:02) (108/64 - 151/92)  BP(mean): 78 (07 Sep 2017 12:02) (78 - 112)  RR: 17 (07 Sep 2017 12:02) (17 - 27)  SpO2: 100% (07 Sep 2017 12:02) (99% - 100%)           REVIEW OF SYSTEMS: Limit or unable to obtain secondary to patient's poor mental status.        On Neurological Examination:    Mental Status - Patient is alert, awake,       Follow simple commands      Speech -  say few words     Cranial Nerves - Pupils 3 mm equal and reactive to light,   extraocular eye movements intact.   smile symmetric  intact bilateral NLF    Motor Exam -     positive movement of all 4 extremities    Muscle tone - is normal all over.  No asymmetry is seen.      Sensory    Bilateral intact to light touch    Gait -  normal  ataxia     GENERAL Exam: Nontoxic , No Acute Distress   	  HEENT:  normocephalic, atraumatic  		  LUNGS:   Decreased bilaterally  	  HEART: Normal S1S2   No murmur RRR        	  GI/ ABDOMEN:  Soft  Non tender    EXTREMITIES:   No Edema  No Clubbing  No Cyanosis No Edema    MUSCULOSKELETAL: Normal Range of Motion  	   SKIN: + rash better         LABS:  CBC Full  -  ( 07 Sep 2017 07:01 )  WBC Count : 10.9 K/uL  Hemoglobin : 12.4 g/dL  Hematocrit : 38.0 %  Platelet Count - Automated : 304 K/uL  Mean Cell Volume : 93.5 fl  Mean Cell Hemoglobin : 30.5 pg  Mean Cell Hemoglobin Concentration : 32.6 gm/dL  Auto Neutrophil # : x  Auto Lymphocyte # : x  Auto Monocyte # : x  Auto Eosinophil # : x  Auto Basophil # : x  Auto Neutrophil % : x  Auto Lymphocyte % : x  Auto Monocyte % : x  Auto Eosinophil % : x  Auto Basophil % : x      09-07    141  |  109<H>  |  18  ----------------------------<  102<H>  3.8   |  21<L>  |  2.03<H>    Ca    7.9<L>      07 Sep 2017 07:01      Hemoglobin A1C:       Vitamin B12         RADIOLOGY      ANALYSIS AND PLAN:  An 88-year-old with episode of changes in mental status and history of dementia.  1.	For changes in mental status, most likely toxic metabolic encephalopathy, which is multifactorial, possibly secondary to underlying viral-type syndrome along with hypernatremia and acute kidney injury.  2.	Recommend IV fluid hydration and monitor renal function.  3.	Recommend antibiotics as needed.  4.	Monitor patient's electrolytes.  5.	aspiration  precautions   6.	monitor oral intake   7.	PT eval if possible  8.	renal function improving   9.	monitor oral intake   10.	For history of dementia, for now we will recommend supportive therapy.  I spoke with daughter Valery at [phone] 9/5/17.  no response today  She is aware that in the hospital setting, patient may become more disoriented.  He does have a history of sundowning and if necessary she will come stay with him.  We will continue to follow. Neurology follow up note    AZUL VILLA88yMale      Interval History:    Patient with anyi     MEDICATIONS    heparin  Injectable 5000 Unit(s) SubCutaneous every 12 hours  risperiDONE   Tablet 0.25 milliGRAM(s) Oral at bedtime  acetaminophen    Suspension. 650 milliGRAM(s) Oral every 8 hours PRN  dextrose 5%. 1000 milliLiter(s) IV Continuous <Continuous>  acetaminophen  Suppository 650 milliGRAM(s) Rectal every 6 hours PRN  piperacillin/tazobactam IVPB. 3.375 Gram(s) IV Intermittent every 12 hours  ALBUTerol    0.083% 2.5 milliGRAM(s) Nebulizer every 12 hours  lidocaine 5% Ointment 1 Application(s) Topical daily PRN  hydrocortisone 1% Cream 1 Application(s) Topical two times a day      Allergies    No Known Allergies    Intolerances            Vital Signs Last 24 Hrs  T(C): 36.3 (07 Sep 2017 14:20), Max: 36.9 (06 Sep 2017 20:05)  T(F): 97.3 (07 Sep 2017 14:20), Max: 98.5 (06 Sep 2017 20:05)  HR: 80 (07 Sep 2017 12:02) (80 - 111)  BP: 108/64 (07 Sep 2017 12:02) (108/64 - 151/92)  BP(mean): 78 (07 Sep 2017 12:02) (78 - 112)  RR: 17 (07 Sep 2017 12:02) (17 - 27)  SpO2: 100% (07 Sep 2017 12:02) (99% - 100%)           REVIEW OF SYSTEMS: Limit or unable to obtain secondary to patient's poor mental status.        On Neurological Examination:    Mental Status - Patient is alert, awake,       Follow simple commands      Speech -  say few words     Cranial Nerves - Pupils 3 mm equal and reactive to light,   extraocular eye movements intact.   smile symmetric  intact bilateral NLF    Motor Exam -     positive movement of all 4 extremities    Muscle tone - is normal all over.  No asymmetry is seen.      Sensory    Bilateral intact to light touch    Gait -  normal  ataxia     GENERAL Exam: Nontoxic , No Acute Distress   	  HEENT:  normocephalic, atraumatic  		  LUNGS:   Decreased bilaterally  	  HEART: Normal S1S2   No murmur RRR        	  GI/ ABDOMEN:  Soft  Non tender    EXTREMITIES:   No Edema  No Clubbing  No Cyanosis No Edema    MUSCULOSKELETAL: Normal Range of Motion  	   SKIN: + rash better         LABS:  CBC Full  -  ( 07 Sep 2017 07:01 )  WBC Count : 10.9 K/uL  Hemoglobin : 12.4 g/dL  Hematocrit : 38.0 %  Platelet Count - Automated : 304 K/uL  Mean Cell Volume : 93.5 fl  Mean Cell Hemoglobin : 30.5 pg  Mean Cell Hemoglobin Concentration : 32.6 gm/dL  Auto Neutrophil # : x  Auto Lymphocyte # : x  Auto Monocyte # : x  Auto Eosinophil # : x  Auto Basophil # : x  Auto Neutrophil % : x  Auto Lymphocyte % : x  Auto Monocyte % : x  Auto Eosinophil % : x  Auto Basophil % : x      09-07    141  |  109<H>  |  18  ----------------------------<  102<H>  3.8   |  21<L>  |  2.03<H>    Ca    7.9<L>      07 Sep 2017 07:01      Hemoglobin A1C:       Vitamin B12         RADIOLOGY      ANALYSIS AND PLAN:  An 88-year-old with episode of changes in mental status and history of dementia.  1.	For changes in mental status, most likely toxic metabolic encephalopathy, which is multifactorial, possibly secondary to underlying viral-type syndrome along with hypernatremia and acute kidney injury.  2.	Recommend IV fluid hydration and monitor renal function.  3.	Recommend antibiotics as needed.  4.	Monitor patient's electrolytes.  5.	aspiration  precautions   6.	monitor oral intake   7.	PT eval if possible  8.	renal function improving   9.	monitor oral intake   10.	For history of dementia, for now we will recommend supportive therapy.  11.	neck pain will try lidoderm patch   I spoke with daughter Valery at [phone] 9/6/17. She is aware that in the hospital setting, patient may become more disoriented.  He does have a history of sundowning and if necessary she will come stay with him.  We will continue to follow.

## 2017-09-07 NOTE — DISCHARGE NOTE ADULT - PLAN OF CARE
completed antibiotic course. Keep in sitting position for 30-45 mins during/after meals.  puree diet nectar thick liquids improved; now with CKD III resolved stable

## 2017-09-07 NOTE — DISCHARGE NOTE ADULT - PATIENT PORTAL LINK FT
“You can access the [facility] Patient Portal, offered by [facility], by registering with the following website: [email or web address]”

## 2017-09-07 NOTE — PROGRESS NOTE ADULT - GASTROINTESTINAL DETAILS
no distention/soft
normal/soft/no distention
soft/no distention

## 2017-09-07 NOTE — DISCHARGE NOTE ADULT - MEDICATION SUMMARY - MEDICATIONS TO TAKE
Statement Selected I will START or STAY ON the medications listed below when I get home from the hospital:    acetaminophen 160 mg/5 mL oral suspension  -- 20.31 milliliter(s) by mouth every 8 hours, As needed, Moderate Pain (4 - 6)  -- Indication: For pain    risperiDONE 0.25 mg oral tablet  -- 1  by mouth once a day (at bedtime)   -- Indication: For Dementia    lidocaine 5% topical film  -- Apply on skin to affected area once a day, As Needed  . neck pain.   -- Indication: For Neck pain from arthritis    hydrocortisone 1% topical cream  -- 1 application on skin 2 times a day for 5 days.  -- Indication: For itchy rash - improving

## 2017-09-07 NOTE — DISCHARGE NOTE ADULT - OTHER SIGNIFICANT FINDINGS
88 year old male Dementia admitted with mental status change and BRAYDEN   Severe dehydration with hypernatremia  Enterovirus/Rhinovirus infection   Rash likely sec viral illness improved with hydrocortisone cream. Pt had new fever in hospital,   poss aspiration pneumonia started on zosyn 0n9/3. pt remains afebrile mental status improved. Serum Na improved. BRAYDEN improving. c/w IV antibiotics for pneumonia as per pulmonary.  then d/c plan to rehab.

## 2017-09-07 NOTE — DISCHARGE NOTE ADULT - SECONDARY DIAGNOSIS.
Acute renal failure, unspecified acute renal failure type Dehydration Dementia with behavioral disturbance, unspecified dementia type Hypernatremia

## 2017-09-07 NOTE — DISCHARGE NOTE ADULT - CARE PLAN
Principal Discharge DX:	Aspiration pneumonia  Secondary Diagnosis:	Acute renal failure, unspecified acute renal failure type  Secondary Diagnosis:	Dehydration  Secondary Diagnosis:	Dementia with behavioral disturbance, unspecified dementia type  Secondary Diagnosis:	Hypernatremia Principal Discharge DX:	Aspiration pneumonia  Goal:	completed antibiotic course.  Instructions for follow-up, activity and diet:	Keep in sitting position for 30-45 mins during/after meals.  puree diet nectar thick liquids  Secondary Diagnosis:	Acute renal failure, unspecified acute renal failure type  Instructions for follow-up, activity and diet:	improved; now with CKD III  Secondary Diagnosis:	Dehydration  Instructions for follow-up, activity and diet:	resolved  Secondary Diagnosis:	Dementia with behavioral disturbance, unspecified dementia type  Instructions for follow-up, activity and diet:	stable  Secondary Diagnosis:	Hypernatremia  Instructions for follow-up, activity and diet:	resolved

## 2017-09-07 NOTE — PROGRESS NOTE ADULT - SUBJECTIVE AND OBJECTIVE BOX
Patient is a 88y old  Male who presents with a chief complaint of lethargy/AMS (01 Sep 2017 11:05)      Patient seen in follow up for BRAYDEN, Hypernatremia. Improving sodium levels. Stable renal function. Started on PO feeds.     PAST MEDICAL HISTORY:  Dementia with behavioral disturbance  No pertinent past medical history      MEDICATIONS  (STANDING):  heparin  Injectable 5000 Unit(s) SubCutaneous every 12 hours  risperiDONE   Tablet 0.25 milliGRAM(s) Oral at bedtime  dextrose 5%. 1000 milliLiter(s) (75 mL/Hr) IV Continuous <Continuous>  piperacillin/tazobactam IVPB. 3.375 Gram(s) IV Intermittent every 12 hours  ALBUTerol    0.083% 2.5 milliGRAM(s) Nebulizer every 12 hours  hydrocortisone 1% Cream 1 Application(s) Topical two times a day    MEDICATIONS  (STANDING):  heparin  Injectable 5000 Unit(s) SubCutaneous every 12 hours  risperiDONE   Tablet 0.25 milliGRAM(s) Oral at bedtime  dextrose 5%. 1000 milliLiter(s) (75 mL/Hr) IV Continuous <Continuous>  piperacillin/tazobactam IVPB. 3.375 Gram(s) IV Intermittent every 12 hours  ALBUTerol    0.083% 2.5 milliGRAM(s) Nebulizer every 12 hours  hydrocortisone 1% Cream 1 Application(s) Topical two times a day    MEDICATIONS  (PRN):  acetaminophen    Suspension. 650 milliGRAM(s) Oral every 8 hours PRN Moderate Pain (4 - 6)  acetaminophen  Suppository 650 milliGRAM(s) Rectal every 6 hours PRN For Temp greater than 38 C (100.4 F)  lidocaine 5% Ointment 1 Application(s) Topical daily PRN to viral rash on Back    T(C): 36.2 (09-07-17 @ 08:00), Max: 36.9 (09-05-17 @ 23:48)  HR: 80 (09-07-17 @ 12:02) (74 - 111)  BP: 108/64 (09-07-17 @ 12:02) (102/57 - 151/92)  RR: 17 (09-07-17 @ 12:02)  SpO2: 100% (09-07-17 @ 12:02)  Wt(kg): --  I&O's Detail    06 Sep 2017 07:01  -  07 Sep 2017 07:00  --------------------------------------------------------  IN:    dextrose 5%.: 1800 mL    IV PiggyBack: 200 mL    Oral Fluid: 370 mL  Total IN: 2370 mL    OUT:  Total OUT: 0 mL    Total NET: 2370 mL      07 Sep 2017 07:01  -  07 Sep 2017 13:19  --------------------------------------------------------  IN:    dextrose 5%.: 375 mL    Oral Fluid: 170 mL  Total IN: 545 mL    OUT:  Total OUT: 0 mL    Total NET: 545 mL              PHYSICAL EXAM:  General: NAD  Respiratory: b/l air entry  Cardiovascular: S1 S2  Gastrointestinal: soft  Extremities:  no edema               LABORATORY:                        12.4   10.9  )-----------( 304      ( 07 Sep 2017 07:01 )             38.0     09-07    141  |  109<H>  |  18  ----------------------------<  102<H>  3.8   |  21<L>  |  2.03<H>    Ca    7.9<L>      07 Sep 2017 07:01      Sodium, Serum: 141 mmol/L (09-07 @ 07:01)  Sodium, Serum: 138 mmol/L (09-06 @ 11:05)    Potassium, Serum: 3.8 mmol/L (09-07 @ 07:01)  Potassium, Serum: 3.8 mmol/L (09-06 @ 11:05)    Hemoglobin: 12.4 g/dL (09-07 @ 07:01)  Hemoglobin: 12.6 g/dL (09-06 @ 11:05)  Hemoglobin: 13.6 g/dL (09-05 @ 06:43)    Creatinine, Serum 2.03 (09-07 @ 07:01)  Creatinine, Serum 2.14 (09-06 @ 11:05)  Creatinine, Serum 2.30 (09-05 @ 06:43)

## 2017-09-07 NOTE — DISCHARGE NOTE ADULT - HOSPITAL COURSE
88 male from home came in with AMS/lethargy.  Patient likely has dementia and is altered from baseline - unable to provide history.  Spoke to Valery - HCP/caretaker about care plan.  Found to be hypernatremic - improved over time.  Aspiration pneumonia - completed antibiotic course.  Swallow eval - puree diet honey thick liquids.  Pt had new fever in hospital - Enterovirus/Rhinovirus infection resolved.  Rash likely sec viral illness improved with hydrocortisone cream.  Good appetite.  Chronic hearing loss - HCP working on hearing aid problem.  Seen by PT - rehab facility recommended.  Called PCP - left message.  Spoke to HCP - she is very thankful to [facility] team for the excellent multi-disciplinary teamwork in caring for her father.

## 2017-09-07 NOTE — PROGRESS NOTE ADULT - ASSESSMENT
88 male with    1. acute encephalopathy :  metabolic encephalopathy- c/w ivf. hyponatremia improved.    2. dementia with behavioral disturbance: risperdal prn. f/up neuro recs    3. BRAYDEN/ATN due to dehydration: renal dose meds and avoid nephrotoxics. monitor intake and output on IVF. f/up renal recs    4. aspiration pneumonia- bilateral lower lobe-  started on iv zosyn.    5. poor dentition/aspiration risk: swallow eval. NPO for now    6. deconditioning: PT/Nutrition eval.    7. heparin SQ for dvt ppx    8. dispo: rehab  when acute medical issues resolved.  poor prognosis.  DNR.   8.Skin rashes- likly due to viral infection. will apply hydrocortisone cream locally.  .

## 2017-09-07 NOTE — PROGRESS NOTE ADULT - PROBLEM SELECTOR PLAN 1
HOB elevation  ABX  ID following  pt is DNR  keep sat > 90 pct  bronchodilators  chest pt  suction PRN

## 2017-09-07 NOTE — PROGRESS NOTE ADULT - SUBJECTIVE AND OBJECTIVE BOX
Date/Time Patient Seen:  		  Referring MD:   Data Reviewed	       Patient is a 88y old  Male who presents with a chief complaint of lethargy/AMS (01 Sep 2017 11:05)  in bed  seen and examined  vs and meds reviewed        Subjective/HPI     PAST MEDICAL & SURGICAL HISTORY:  Dementia with behavioral disturbance  No pertinent past medical history  No significant past surgical history        Medication list         MEDICATIONS  (STANDING):  heparin  Injectable 5000 Unit(s) SubCutaneous every 12 hours  risperiDONE   Tablet 0.25 milliGRAM(s) Oral at bedtime  dextrose 5%. 1000 milliLiter(s) (75 mL/Hr) IV Continuous <Continuous>  piperacillin/tazobactam IVPB. 3.375 Gram(s) IV Intermittent every 12 hours  ALBUTerol    0.083% 2.5 milliGRAM(s) Nebulizer every 12 hours  hydrocortisone 1% Cream 1 Application(s) Topical two times a day    MEDICATIONS  (PRN):  acetaminophen    Suspension. 650 milliGRAM(s) Oral every 8 hours PRN Moderate Pain (4 - 6)  acetaminophen  Suppository 650 milliGRAM(s) Rectal every 6 hours PRN For Temp greater than 38 C (100.4 F)  lidocaine 5% Ointment 1 Application(s) Topical daily PRN to viral rash on Back         Vitals log        ICU Vital Signs Last 24 Hrs  T(C): 36.6 (07 Sep 2017 04:07), Max: 36.9 (06 Sep 2017 20:05)  T(F): 97.8 (07 Sep 2017 04:07), Max: 98.5 (06 Sep 2017 20:05)  HR: 111 (07 Sep 2017 06:00) (74 - 111)  BP: 151/92 (07 Sep 2017 06:00) (118/70 - 151/92)  BP(mean): 112 (07 Sep 2017 06:00) (75 - 112)  ABP: --  ABP(mean): --  RR: 24 (07 Sep 2017 06:00) (18 - 27)  SpO2: 100% (07 Sep 2017 06:00) (96% - 100%)           Input and Output:  I&O's Detail    06 Sep 2017 07:01  -  07 Sep 2017 07:00  --------------------------------------------------------  IN:    dextrose 5%.: 1725 mL    IV PiggyBack: 200 mL    Oral Fluid: 370 mL  Total IN: 2295 mL    OUT:  Total OUT: 0 mL    Total NET: 2295 mL          Lab Data                        12.4   10.9  )-----------( 304      ( 07 Sep 2017 07:01 )             38.0     09-07    141  |  109<H>  |  18  ----------------------------<  102<H>  3.8   |  21<L>  |  2.03<H>    Ca    7.9<L>      07 Sep 2017 07:01              Review of Systems	      Objective     Physical Examination    head at  heart - s1s2  lungs - dec BS  abd - soft  cn grossly int      Pertinent Lab findings & Imaging      Renea:  NO   Adequate UO     I&O's Detail    06 Sep 2017 07:01  -  07 Sep 2017 07:00  --------------------------------------------------------  IN:    dextrose 5%.: 1725 mL    IV PiggyBack: 200 mL    Oral Fluid: 370 mL  Total IN: 2295 mL    OUT:  Total OUT: 0 mL    Total NET: 2295 mL               Discussed with:     Cultures:	        Radiology

## 2017-09-07 NOTE — PROGRESS NOTE ADULT - SUBJECTIVE AND OBJECTIVE BOX
AZUL VILLA is a 88yMale , patient examined and chart reviewed.     INTERVAL HPI/ OVERNIGHT EVENTS:  No events, Sitting in chair. Afebrile.    Past Medical History--  PAST MEDICAL & SURGICAL HISTORY:  Dementia with behavioral disturbance  No significant past surgical history      For details regarding the patient's social history, family history, and other miscellaneous elements, please refer the initial infectious diseases consultation and/or the admitting history and physical examination for this admission.    ROS:  Unable to obtain due to : Confusion      Current inpatient medications :    ANTIBIOTICS/RELEVANT:  piperacillin/tazobactam IVPB. 3.375 Gram(s) IV Intermittent every 12 hours      heparin  Injectable 5000 Unit(s) SubCutaneous every 12 hours  risperiDONE   Tablet 0.25 milliGRAM(s) Oral at bedtime  acetaminophen    Suspension. 650 milliGRAM(s) Oral every 8 hours PRN  dextrose 5%. 1000 milliLiter(s) IV Continuous <Continuous>  acetaminophen  Suppository 650 milliGRAM(s) Rectal every 6 hours PRN  ALBUTerol    0.083% 2.5 milliGRAM(s) Nebulizer every 12 hours  lidocaine 5% Ointment 1 Application(s) Topical daily PRN  hydrocortisone 1% Cream 1 Application(s) Topical two times a day  lidocaine   Patch 1 Patch Transdermal daily      Objective:    09-06 @ 07:01  -  09-07 @ 07:00  --------------------------------------------------------  IN: 2370 mL / OUT: 0 mL / NET: 2370 mL    09-07 @ 07:01  -  09-07 @ 16:31  --------------------------------------------------------  IN: 870 mL / OUT: 0 mL / NET: 870 mL      T(C): 36.4 (09-07-17 @ 15:38), Max: 36.9 (09-06-17 @ 20:05)  HR: 84 (09-07-17 @ 15:38) (80 - 111)  BP: 119/62 (09-07-17 @ 15:38) (108/64 - 151/92)  RR: 22 (09-07-17 @ 15:38) (17 - 26)  SpO2: 100% (09-07-17 @ 15:38) (99% - 100%)  Wt(kg): --      Physical Exam:  GEN: NAD, pleasant Sitting in chair  HEENT: normocephalic and atraumatic. EOMI. JOSE ALFREDO. Moist mucosa. Clear Posterior pharynx.  NECK: Supple. No carotid bruits.  No lymphadenopathy or thyromegaly.  LUNGS: Decreased to auscultation.  HEART: Regular rate and rhythm without murmur.  ABDOMEN: Soft, nontender, and nondistended.  Positive bowel sounds.  No hepatosplenomegaly was noted.  EXTREMITIES: Without any cyanosis, clubbing, rash, lesions +edema.  NEUROLOGIC: Confused no focal deficit   SKIN: No ulceration or induration present.      LABS:                        12.4   10.9  )-----------( 304      ( 07 Sep 2017 07:01 )             38.0       09-07    141  |  109<H>  |  18  ----------------------------<  102<H>  3.8   |  21<L>  |  2.03<H>    Ca    7.9<L>      07 Sep 2017 07:01      RECENT CULTURES:   Culture - Sputum . (09.04.17 @ 13:53)    -  Ampicillin: R >16    -  Aztreonam: S <=4    -  Cefazolin: R >16    -  Cefoxitin: S <=4    -  Ceftazidime: S <=1    -  Meropenem: S <=1    -  Ceftriaxone: S <=1    -  Ciprofloxacin: S <=0.5    -  Ertapenem: S <=0.5    -  Gentamicin: S <=1    -  Piperacillin/Tazobactam: R >64    -  Imipenem: S <=1    -  Tobramycin: S <=2    -  Amikacin: S <=8    -  Ampicillin/Sulbactam: R >16/8    -  Cefepime: S <=2    -  Levofloxacin: S <=1    -  Trimethoprim/Sulfamethoxazole: S <=0.5/9.5    Gram Stain:   No polymorphonuclear leukocytes per low power field  No Squamous epithelial cells per low power field  Moderate Yeast like cells  Rare Gram Negative Rods    Specimen Source: .Sputum Sputum trap    Culture Results:   Numerous Klebsiella pneumoniae  Normal Respiratory Yumiko present    Organism Identification: Klebsiella pneumoniae    Organism: Klebsiella pneumoniae    Method Type: DOMINGUEZ    Culture - Blood (09.04.17 @ 12:44)    Specimen Source: .Blood    Culture Results:   No growth to date.    Culture - Urine (09.04.17 @ 11:35)    Specimen Source: .Urine Catheterized    Culture Results:   No growth        RADIOLOGY & ADDITIONAL STUDIES:  EXAM:  CHEST-PORTABLE IMMEDIATE                          PROCEDURE DATE:  09/03/2017        INTERPRETATION:  CLINICAL INFORMATION:Fever    TECHNIQUE: AP chest film. Comparison is made to 9/1/2017    FINDINGS: There are faint patchy opacities at the lung bases.  Heart size   is within normal limits, however the aorta is ectatic. There are   degenerative changes of the spine.    IMPRESSION: New faint patchy opacities at the lung bases compatible with   atelectasis and/or pneumonia.    Assessment :   88 year old male Dementia admitted with mental status change and BRAYDEN   Sp severe dehydration with hypernatremia   Enterovirus/Rhinovirus infection   Rash likely sec viral illness  New fever likely aspiration pneumonia  CXR noted  Seen by speech - diet modified  BRAYDEN improving    Plan :   Zosyn for now day 5/7  Trend wbc  Aspiration precautions  Monitor renal fx  Increase activity  DNRDNI      Critical care time greater then 35 minutes reviewing notes, labs data/ imaging , discussion with multidisciplinary team.    Thank you for allowing me to participate in care of your patient .        Rik Erwin MD  Infectious Disease  [phone]

## 2017-09-07 NOTE — PROGRESS NOTE ADULT - SUBJECTIVE AND OBJECTIVE BOX
Patient is a 88y old  Male who presents with a chief complaint of lethargy/AMS (01 Sep 2017 11:05)      INTERVAL HPI/OVERNIGHT EVENTS:  Pt is seen and examined.  confused, sitting on chair.    Pain Location & Control:     MEDICATIONS  (STANDING):  heparin  Injectable 5000 Unit(s) SubCutaneous every 12 hours  risperiDONE   Tablet 0.25 milliGRAM(s) Oral at bedtime  dextrose 5%. 1000 milliLiter(s) (75 mL/Hr) IV Continuous <Continuous>  piperacillin/tazobactam IVPB. 3.375 Gram(s) IV Intermittent every 12 hours  ALBUTerol    0.083% 2.5 milliGRAM(s) Nebulizer every 12 hours  hydrocortisone 1% Cream 1 Application(s) Topical two times a day    MEDICATIONS  (PRN):  acetaminophen    Suspension. 650 milliGRAM(s) Oral every 8 hours PRN Moderate Pain (4 - 6)  acetaminophen  Suppository 650 milliGRAM(s) Rectal every 6 hours PRN For Temp greater than 38 C (100.4 F)  lidocaine 5% Ointment 1 Application(s) Topical daily PRN to viral rash on Back      Allergies    No Known Allergies    Intolerances            Vital Signs Last 24 Hrs  T(C): 36.2 (07 Sep 2017 08:00), Max: 36.9 (06 Sep 2017 20:05)  T(F): 97.1 (07 Sep 2017 08:00), Max: 98.5 (06 Sep 2017 20:05)  HR: 102 (07 Sep 2017 08:15) (81 - 111)  BP: 119/76 (07 Sep 2017 08:15) (118/70 - 151/92)  BP(mean): 89 (07 Sep 2017 08:15) (75 - 112)  RR: 17 (07 Sep 2017 08:15) (17 - 27)  SpO2: 100% (07 Sep 2017 08:15) (99% - 100%)        LABS:                        12.4   10.9  )-----------( 304      ( 07 Sep 2017 07:01 )             38.0     07 Sep 2017 07:01    141    |  109    |  18     ----------------------------<  102    3.8     |  21     |  2.03     Ca    7.9        07 Sep 2017 07:01          RADIOLOGY & ADDITIONAL TESTS:    Imaging Personally Reviewed:  [ ] YES  [ ] NO    Consultant(s) Notes Reviewed:  [x ] YES  [ ] NO    Care Discussed with Consultants/Other Providers [x ] YES  [ ] NO

## 2017-09-08 PROCEDURE — 99233 SBSQ HOSP IP/OBS HIGH 50: CPT

## 2017-09-08 RX ADMIN — LIDOCAINE 1 PATCH: 4 CREAM TOPICAL at 14:17

## 2017-09-08 RX ADMIN — HEPARIN SODIUM 5000 UNIT(S): 5000 INJECTION INTRAVENOUS; SUBCUTANEOUS at 21:32

## 2017-09-08 RX ADMIN — HEPARIN SODIUM 5000 UNIT(S): 5000 INJECTION INTRAVENOUS; SUBCUTANEOUS at 10:51

## 2017-09-08 RX ADMIN — PIPERACILLIN AND TAZOBACTAM 25 GRAM(S): 4; .5 INJECTION, POWDER, LYOPHILIZED, FOR SOLUTION INTRAVENOUS at 05:45

## 2017-09-08 RX ADMIN — ALBUTEROL 2.5 MILLIGRAM(S): 90 AEROSOL, METERED ORAL at 19:40

## 2017-09-08 RX ADMIN — SODIUM CHLORIDE 75 MILLILITER(S): 9 INJECTION, SOLUTION INTRAVENOUS at 00:32

## 2017-09-08 RX ADMIN — PIPERACILLIN AND TAZOBACTAM 25 GRAM(S): 4; .5 INJECTION, POWDER, LYOPHILIZED, FOR SOLUTION INTRAVENOUS at 17:31

## 2017-09-08 RX ADMIN — ALBUTEROL 2.5 MILLIGRAM(S): 90 AEROSOL, METERED ORAL at 07:41

## 2017-09-08 RX ADMIN — LIDOCAINE 1 PATCH: 4 CREAM TOPICAL at 05:38

## 2017-09-08 RX ADMIN — RISPERIDONE 0.25 MILLIGRAM(S): 4 TABLET ORAL at 21:33

## 2017-09-08 RX ADMIN — Medication 1 APPLICATION(S): at 05:46

## 2017-09-08 RX ADMIN — Medication 1 APPLICATION(S): at 17:32

## 2017-09-08 NOTE — PROGRESS NOTE ADULT - SUBJECTIVE AND OBJECTIVE BOX
Date/Time Patient Seen:  		  Referring MD:   Data Reviewed	       Patient is a 88y old  Male who presents with a chief complaint of lethargy/AMS (07 Sep 2017 15:17)    in bed  seen and examined  vs and meds reviewed    Subjective/HPI     PAST MEDICAL & SURGICAL HISTORY:  Dementia with behavioral disturbance  No pertinent past medical history  No significant past surgical history        Medication list         MEDICATIONS  (STANDING):  heparin  Injectable 5000 Unit(s) SubCutaneous every 12 hours  risperiDONE   Tablet 0.25 milliGRAM(s) Oral at bedtime  dextrose 5%. 1000 milliLiter(s) (75 mL/Hr) IV Continuous <Continuous>  piperacillin/tazobactam IVPB. 3.375 Gram(s) IV Intermittent every 12 hours  ALBUTerol    0.083% 2.5 milliGRAM(s) Nebulizer every 12 hours  hydrocortisone 1% Cream 1 Application(s) Topical two times a day  lidocaine   Patch 1 Patch Transdermal daily    MEDICATIONS  (PRN):  acetaminophen    Suspension. 650 milliGRAM(s) Oral every 8 hours PRN Moderate Pain (4 - 6)  acetaminophen  Suppository 650 milliGRAM(s) Rectal every 6 hours PRN For Temp greater than 38 C (100.4 F)  lidocaine 5% Ointment 1 Application(s) Topical daily PRN to viral rash on Back         Vitals log        ICU Vital Signs Last 24 Hrs  T(C): 36.8 (08 Sep 2017 04:01), Max: 36.9 (08 Sep 2017 00:01)  T(F): 98.2 (08 Sep 2017 04:01), Max: 98.4 (08 Sep 2017 00:01)  HR: 79 (08 Sep 2017 04:00) (79 - 102)  BP: 131/71 (08 Sep 2017 04:00) (108/64 - 131/71)  BP(mean): 89 (08 Sep 2017 04:00) (78 - 89)  ABP: --  ABP(mean): --  RR: 19 (08 Sep 2017 04:00) (17 - 22)  SpO2: 99% (08 Sep 2017 04:00) (99% - 100%)           Input and Output:  I&O's Detail    06 Sep 2017 07:01  -  07 Sep 2017 07:00  --------------------------------------------------------  IN:    dextrose 5%.: 1800 mL    IV PiggyBack: 200 mL    Oral Fluid: 370 mL  Total IN: 2370 mL    OUT:  Total OUT: 0 mL    Total NET: 2370 mL      07 Sep 2017 07:01  -  08 Sep 2017 06:47  --------------------------------------------------------  IN:    dextrose 5%.: 1650 mL    IV PiggyBack: 200 mL    Oral Fluid: 430 mL  Total IN: 2280 mL    OUT:  Total OUT: 0 mL    Total NET: 2280 mL          Lab Data                        12.4   10.9  )-----------( 304      ( 07 Sep 2017 07:01 )             38.0     09-07    141  |  109<H>  |  18  ----------------------------<  102<H>  3.8   |  21<L>  |  2.03<H>    Ca    7.9<L>      07 Sep 2017 07:01              Review of Systems	      Objective     Physical Examination  head at  heart - s1s2  lungs - occ crackles  abd - soft        Pertinent Lab findings & Imaging      Renea:  NO   Adequate UO     I&O's Detail    06 Sep 2017 07:01  -  07 Sep 2017 07:00  --------------------------------------------------------  IN:    dextrose 5%.: 1800 mL    IV PiggyBack: 200 mL    Oral Fluid: 370 mL  Total IN: 2370 mL    OUT:  Total OUT: 0 mL    Total NET: 2370 mL      07 Sep 2017 07:01  -  08 Sep 2017 06:47  --------------------------------------------------------  IN:    dextrose 5%.: 1650 mL    IV PiggyBack: 200 mL    Oral Fluid: 430 mL  Total IN: 2280 mL    OUT:  Total OUT: 0 mL    Total NET: 2280 mL               Discussed with:     Cultures:	        Radiology

## 2017-09-08 NOTE — PROGRESS NOTE ADULT - SUBJECTIVE AND OBJECTIVE BOX
Neurology follow up note    AZUL VILLA88yMale      Interval History:    Patient seen with aid less neck pain     MEDICATIONS    heparin  Injectable 5000 Unit(s) SubCutaneous every 12 hours  risperiDONE   Tablet 0.25 milliGRAM(s) Oral at bedtime  acetaminophen    Suspension. 650 milliGRAM(s) Oral every 8 hours PRN  dextrose 5%. 1000 milliLiter(s) IV Continuous <Continuous>  acetaminophen  Suppository 650 milliGRAM(s) Rectal every 6 hours PRN  piperacillin/tazobactam IVPB. 3.375 Gram(s) IV Intermittent every 12 hours  ALBUTerol    0.083% 2.5 milliGRAM(s) Nebulizer every 12 hours  lidocaine 5% Ointment 1 Application(s) Topical daily PRN  hydrocortisone 1% Cream 1 Application(s) Topical two times a day  lidocaine   Patch 1 Patch Transdermal daily      Allergies    No Known Allergies    Intolerances            Vital Signs Last 24 Hrs  T(C): 37.1 (09 Sep 2017 12:01), Max: 37.1 (09 Sep 2017 12:01)  T(F): 98.8 (09 Sep 2017 12:01), Max: 98.8 (09 Sep 2017 12:01)  HR: 65 (09 Sep 2017 10:00) (65 - 93)  BP: 140/80 (09 Sep 2017 08:00) (108/55 - 178/69)  BP(mean): 89 (08 Sep 2017 21:57) (70 - 101)  RR: 15 (09 Sep 2017 10:00) (15 - 32)  SpO2: 100% (09 Sep 2017 10:00) (99% - 100%)       REVIEW OF SYSTEMS: Limit or unable to obtain secondary to patient's poor mental status.        On Neurological Examination:    Mental Status - Patient is alert, awake,       Follow simple commands      Speech -  say few words     Cranial Nerves - Pupils 3 mm equal and reactive to light,   extraocular eye movements intact.   smile symmetric  intact bilateral NLF    Motor Exam -     positive movement of all 4 extremities    Muscle tone - is normal all over.  No asymmetry is seen.      Sensory    Bilateral intact to light touch    Gait -  normal  ataxia     GENERAL Exam: Nontoxic , No Acute Distress   	  HEENT:  normocephalic, atraumatic  		  LUNGS:   Decreased bilaterally  	  HEART: Normal S1S2   No murmur RRR        	  GI/ ABDOMEN:  Soft  Non tender    EXTREMITIES:   No Edema  No Clubbing  No Cyanosis No Edema    MUSCULOSKELETAL: Normal Range of Motion  	   SKIN: + rash better              LABS:  CBC Full  -  ( 09 Sep 2017 06:09 )  WBC Count : 10.2 K/uL  Hemoglobin : 12.3 g/dL  Hematocrit : 37.3 %  Platelet Count - Automated : 287 K/uL  Mean Cell Volume : 94.7 fl  Mean Cell Hemoglobin : 31.3 pg  Mean Cell Hemoglobin Concentration : 33.1 gm/dL  Auto Neutrophil # : x  Auto Lymphocyte # : x  Auto Monocyte # : x  Auto Eosinophil # : x  Auto Basophil # : x  Auto Neutrophil % : x  Auto Lymphocyte % : x  Auto Monocyte % : x  Auto Eosinophil % : x  Auto Basophil % : x      09-09    136  |  105  |  16  ----------------------------<  97  4.6   |  22  |  1.89<H>    Ca    8.0<L>      09 Sep 2017 06:09      Hemoglobin A1C:       Vitamin B12         RADIOLOGY    ANALYSIS AND PLAN:  An 88-year-old with episode of changes in mental status and history of dementia.  1.	For changes in mental status, most likely toxic metabolic encephalopathy, which is multifactorial, possibly secondary to underlying viral-type syndrome along with hypernatremia and acute kidney injury.  2.	Recommend IV fluid hydration and monitor renal function.  3.	Recommend antibiotics as needed.  4.	Monitor patient's electrolytes.  5.	aspiration  precautions   6.	monitor oral intake   7.	PT eval if possible  8.	renal function improving   9.	monitor oral intake   10.	For history of dementia, for now we will recommend supportive therapy.  11.	neck pain will try lidoderm patch better   I spoke with daughter Valery at [phone] 9/6/17. She is aware that in the hospital setting, patient may become more disoriented.  He does have a history of sundowning and if necessary she will come stay with him.  We will continue to follow.  Would continue to follow.  30 minutes spent on total encounter; more than 50% of the visit was spent counseling and/or coordinating care by the attending physician.

## 2017-09-08 NOTE — PROGRESS NOTE ADULT - SUBJECTIVE AND OBJECTIVE BOX
AZUL VILLA  88y  Male    Patient is a 88y old  Male who presents with a chief complaint of lethargy/AMS (07 Sep 2017 15:17)    out of bed to chair. lethargic but arousable.       PAST MEDICAL & SURGICAL HISTORY:  Dementia with behavioral disturbance  No significant past surgical history          PHYSICAL EXAM:    T(C): 36.7 (09-08-17 @ 07:39), Max: 36.9 (09-08-17 @ 00:01)  HR: 93 (09-08-17 @ 08:37) (79 - 102)  BP: 145/77 (09-08-17 @ 08:37) (115/69 - 145/77)  RR: 19 (09-08-17 @ 08:37) (19 - 22)  SpO2: 98% (09-08-17 @ 08:37) (98% - 100%)  Wt(kg): --    I&O's Detail    07 Sep 2017 07:01  -  08 Sep 2017 07:00  --------------------------------------------------------  IN:    dextrose 5%.: 1725 mL    IV PiggyBack: 200 mL    Oral Fluid: 430 mL  Total IN: 2355 mL    OUT:  Total OUT: 0 mL    Total NET: 2355 mL      08 Sep 2017 07:01  -  08 Sep 2017 12:27  --------------------------------------------------------  IN:    Oral Fluid: 25 mL  Total IN: 25 mL    OUT:  Total OUT: 0 mL    Total NET: 25 mL          Respiratory: clear anteriorly, decreased BS at bases  Cardiovascular: S1 S2  Gastrointestinal: soft NT ND +BS  Extremities: edema   Neuro: Awake and alert    MEDICATIONS  (STANDING):  heparin  Injectable 5000 Unit(s) SubCutaneous every 12 hours  risperiDONE   Tablet 0.25 milliGRAM(s) Oral at bedtime  dextrose 5%. 1000 milliLiter(s) (75 mL/Hr) IV Continuous <Continuous>  piperacillin/tazobactam IVPB. 3.375 Gram(s) IV Intermittent every 12 hours  ALBUTerol    0.083% 2.5 milliGRAM(s) Nebulizer every 12 hours  hydrocortisone 1% Cream 1 Application(s) Topical two times a day  lidocaine   Patch 1 Patch Transdermal daily    MEDICATIONS  (PRN):  acetaminophen    Suspension. 650 milliGRAM(s) Oral every 8 hours PRN Moderate Pain (4 - 6)  acetaminophen  Suppository 650 milliGRAM(s) Rectal every 6 hours PRN For Temp greater than 38 C (100.4 F)  lidocaine 5% Ointment 1 Application(s) Topical daily PRN to viral rash on Back                            12.4   10.9  )-----------( 304      ( 07 Sep 2017 07:01 )             38.0       09-07    141  |  109<H>  |  18  ----------------------------<  102<H>  3.8   |  21<L>  |  2.03<H>    Ca    7.9<L>      07 Sep 2017 07:01    < from: Xray Chest 1 View AP-PORTABLE IMMEDIATE (09.03.17 @ 20:09) >  PROCEDURE DATE:  09/03/2017          INTERPRETATION:  CLINICAL INFORMATION:Fever    TECHNIQUE: AP chest film. Comparison is made to 9/1/2017    FINDINGS: There are faint patchy opacities at the lung bases.  Heart size   is within normal limits, however the aorta is ectatic. There are   degenerative changes of the spine.    IMPRESSION: New faint patchy opacities at the lung bases compatible with   atelectasis and/or pneumonia.

## 2017-09-08 NOTE — PROGRESS NOTE ADULT - MENTAL STATUS
alert and disoriented.
pt is disoriented.
pt is disoriented.
pt is confused.
pt is disoriented.
alert and oriented x 1, confused and forgetful.

## 2017-09-08 NOTE — PROGRESS NOTE ADULT - SUBJECTIVE AND OBJECTIVE BOX
Patient is a 88y old  Male who presents with a chief complaint of lethargy/AMS (07 Sep 2017 15:17)      INTERVAL HPI/OVERNIGHT EVENTS:  Pt is seen and examined.  more awake, knows his name, still forgetfull and confused.  Pain Location & Control:     MEDICATIONS  (STANDING):  heparin  Injectable 5000 Unit(s) SubCutaneous every 12 hours  risperiDONE   Tablet 0.25 milliGRAM(s) Oral at bedtime  dextrose 5%. 1000 milliLiter(s) (75 mL/Hr) IV Continuous <Continuous>  piperacillin/tazobactam IVPB. 3.375 Gram(s) IV Intermittent every 12 hours  ALBUTerol    0.083% 2.5 milliGRAM(s) Nebulizer every 12 hours  hydrocortisone 1% Cream 1 Application(s) Topical two times a day  lidocaine   Patch 1 Patch Transdermal daily    MEDICATIONS  (PRN):  acetaminophen    Suspension. 650 milliGRAM(s) Oral every 8 hours PRN Moderate Pain (4 - 6)  acetaminophen  Suppository 650 milliGRAM(s) Rectal every 6 hours PRN For Temp greater than 38 C (100.4 F)  lidocaine 5% Ointment 1 Application(s) Topical daily PRN to viral rash on Back      Allergies    No Known Allergies    Intolerances            Vital Signs Last 24 Hrs  T(C): 36.7 (08 Sep 2017 07:39), Max: 36.9 (08 Sep 2017 00:01)  T(F): 98 (08 Sep 2017 07:39), Max: 98.4 (08 Sep 2017 00:01)  HR: 93 (08 Sep 2017 08:37) (79 - 102)  BP: 145/77 (08 Sep 2017 08:37) (108/64 - 145/77)  BP(mean): 99 (08 Sep 2017 08:37) (78 - 99)  RR: 19 (08 Sep 2017 08:37) (17 - 22)  SpO2: 98% (08 Sep 2017 08:37) (98% - 100%)        LABS:      Ca    7.9        07 Sep 2017 07:01          RADIOLOGY & ADDITIONAL TESTS:    Imaging Personally Reviewed:  [ ] YES  [ ] NO    Consultant(s) Notes Reviewed:  [x ] YES  [ ] NO    Care Discussed with Consultants/Other Providers [x ] YES  [ ] NO

## 2017-09-08 NOTE — PROGRESS NOTE ADULT - ASSESSMENT
88 male with    1. acute encephalopathy :  metabolic encephalopathy- c/w ivf. hyponatremia improved.    2. dementia with behavioral disturbance: risperdal prn. f/up neuro recs    3. BRAYDEN/ATN due to dehydration: renal dose meds and avoid nephrotoxics. monitor intake and output on IVF. f/up renal recs    4. aspiration pneumonia- bilateral lower lobe-  started on iv zosyn for 7 days till 9/9/17.    5. poor dentition/aspiration risk: swallow eval. NPO for now    6. deconditioning: PT/Nutrition eval.    7. heparin SQ for dvt ppx    8. dispo: rehab  when acute medical issues resolved.  poor prognosis.  DNR.   8.Skin rashes- likly due to viral infection. will apply hydrocortisone cream locally.    . 88 male with    1. acute encephalopathy :  metabolic encephalopathy- c/w ivf. hyponatremia improved.    2. dementia with behavioral disturbance: risperdal prn. f/up neuro recs    3. BRAYDEN/ATN due to dehydration: renal dose meds and avoid nephrotoxics. monitor intake and output on IVF. f/up renal recs    4. aspiration pneumonia- bilateral lower lobe-  started on iv zosyn on 9/3.plan- as per pulmonary.    5. poor dentition/aspiration risk: swallow eval. NPO for now    6. deconditioning: PT/Nutrition eval.    7. heparin SQ for dvt ppx    8. dispo: rehab  when acute medical issues resolved.  poor prognosis.  DNR.   9.Skin rashes- likly due to viral infection. will apply hydrocortisone cream locally.      .

## 2017-09-08 NOTE — PROGRESS NOTE ADULT - ASSESSMENT
88 white male with dementia admitted with acute MS changes.  found to have BRAYDEN and also possible aspiration PNA  renal indices are slowly but steadily improving.  hypernatremia due to decreased free water intake.  continue the D5W at the present rate.   will follow.

## 2017-09-08 NOTE — PROGRESS NOTE ADULT - SUBJECTIVE AND OBJECTIVE BOX
AZUL VILLA is a 88yMale , patient examined and chart reviewed.    INTERVAL HPI/ OVERNIGHT EVENTS:  Afebrile. Sitting in chair.    Past Medical History--  PAST MEDICAL & SURGICAL HISTORY:  Dementia with behavioral disturbance  No significant past surgical history      For details regarding the patient's social history, family history, and other miscellaneous elements, please refer the initial infectious diseases consultation and/or the admitting history and physical examination for this admission.    ROS:  CONSTITUTIONAL:  Negative fever or chills  EYES:  Negative  blurry vision or double vision  CARDIOVASCULAR:  Negative for chest pain or palpitations  RESPIRATORY:  Negative for cough, wheezing, or SOB   GASTROINTESTINAL:  Negative for nausea, vomiting, diarrhea, constipation, or abdominal pain  GENITOURINARY:  Negative frequency, urgency , dysuria or hematuria   NEUROLOGIC:  No headache, confusion, dizziness, lightheadedness  All other systems were reviewed and are negative       Current inpatient medications :    ANTIBIOTICS/RELEVANT:  piperacillin/tazobactam IVPB. 3.375 Gram(s) IV Intermittent every 12 hours      heparin  Injectable 5000 Unit(s) SubCutaneous every 12 hours  risperiDONE   Tablet 0.25 milliGRAM(s) Oral at bedtime  acetaminophen    Suspension. 650 milliGRAM(s) Oral every 8 hours PRN  dextrose 5%. 1000 milliLiter(s) IV Continuous <Continuous>  acetaminophen  Suppository 650 milliGRAM(s) Rectal every 6 hours PRN  ALBUTerol    0.083% 2.5 milliGRAM(s) Nebulizer every 12 hours  lidocaine 5% Ointment 1 Application(s) Topical daily PRN  hydrocortisone 1% Cream 1 Application(s) Topical two times a day  lidocaine   Patch 1 Patch Transdermal daily      Objective:    09-07 @ 07:01 - 09-08 @ 07:00  --------------------------------------------------------  IN: 2355 mL / OUT: 0 mL / NET: 2355 mL    09-08 @ 07:01 - 09-08 @ 15:30  --------------------------------------------------------  IN: 25 mL / OUT: 0 mL / NET: 25 mL      T(C): 36.7 (09-08-17 @ 12:38), Max: 36.9 (09-08-17 @ 00:01)  HR: 93 (09-08-17 @ 08:37) (79 - 102)  BP: 145/77 (09-08-17 @ 08:37) (115/69 - 145/77)  RR: 19 (09-08-17 @ 08:37) (19 - 22)  SpO2: 98% (09-08-17 @ 08:37) (98% - 100%)  Wt(kg): --      Physical Exam:  GEN: NAD, pleasant  HEENT: normocephalic and atraumatic. EOMI. JOSE ALFREDO. Moist mucosa. Clear Posterior pharynx.  NECK: Supple. No carotid bruits.  No lymphadenopathy or thyromegaly.  LUNGS: Decreased to auscultation.  HEART: Regular rate and rhythm without murmur.  ABDOMEN: Soft, nontender, and nondistended.  Positive bowel sounds.  No hepatosplenomegaly was noted.  EXTREMITIES: Without any cyanosis, clubbing, rash, lesions + edema.  NEUROLOGIC: confused No focal neurological deficits   SKIN: Rash better      LABS:                        12.4   10.9  )-----------( 304      ( 07 Sep 2017 07:01 )             38.0       09-07    141  |  109<H>  |  18  ----------------------------<  102<H>  3.8   |  21<L>  |  2.03<H>    Ca    7.9<L>      07 Sep 2017 07:01      Assessment :   88 year old male Dementia admitted with mental status change and BRAYDEN   Sp severe dehydration with hypernatremia   Enterovirus/Rhinovirus infection   Rash likely sec viral illness  New fever likely aspiration pneumonia  CXR noted  Seen by speech - diet modified  BRAYDEN improving    Plan :   Zosyn for now day 6/7  Dc antibx after tmrw's last dose  Trend wbc  Aspiration precautions  Monitor renal fx  Increase activity  DNRDNI      Critical care time greater then 35 minutes reviewing notes, labs data/ imaging , discussion with multidisciplinary team.    Thank you for allowing me to participate in care of your patient .        Rik Erwin MD  Infectious Disease  [phone]

## 2017-09-09 DIAGNOSIS — F03.91 UNSPECIFIED DEMENTIA WITH BEHAVIORAL DISTURBANCE: ICD-10-CM

## 2017-09-09 LAB
ANION GAP SERPL CALC-SCNC: 9 MMOL/L — SIGNIFICANT CHANGE UP (ref 5–17)
BUN SERPL-MCNC: 16 MG/DL — SIGNIFICANT CHANGE UP (ref 7–23)
CALCIUM SERPL-MCNC: 8 MG/DL — LOW (ref 8.4–10.5)
CHLORIDE SERPL-SCNC: 105 MMOL/L — SIGNIFICANT CHANGE UP (ref 96–108)
CO2 SERPL-SCNC: 22 MMOL/L — SIGNIFICANT CHANGE UP (ref 22–31)
CREAT SERPL-MCNC: 1.89 MG/DL — HIGH (ref 0.5–1.3)
CULTURE RESULTS: SIGNIFICANT CHANGE UP
GLUCOSE SERPL-MCNC: 97 MG/DL — SIGNIFICANT CHANGE UP (ref 70–99)
HCT VFR BLD CALC: 37.3 % — LOW (ref 39–50)
HGB BLD-MCNC: 12.3 G/DL — LOW (ref 13–17)
MCHC RBC-ENTMCNC: 31.3 PG — SIGNIFICANT CHANGE UP (ref 27–34)
MCHC RBC-ENTMCNC: 33.1 GM/DL — SIGNIFICANT CHANGE UP (ref 32–36)
MCV RBC AUTO: 94.7 FL — SIGNIFICANT CHANGE UP (ref 80–100)
PLATELET # BLD AUTO: 287 K/UL — SIGNIFICANT CHANGE UP (ref 150–400)
POTASSIUM SERPL-MCNC: 4.6 MMOL/L — SIGNIFICANT CHANGE UP (ref 3.5–5.3)
POTASSIUM SERPL-SCNC: 4.6 MMOL/L — SIGNIFICANT CHANGE UP (ref 3.5–5.3)
RBC # BLD: 3.94 M/UL — LOW (ref 4.2–5.8)
RBC # FLD: 15.8 % — HIGH (ref 10.3–14.5)
SODIUM SERPL-SCNC: 136 MMOL/L — SIGNIFICANT CHANGE UP (ref 135–145)
SPECIMEN SOURCE: SIGNIFICANT CHANGE UP
WBC # BLD: 10.2 K/UL — SIGNIFICANT CHANGE UP (ref 3.8–10.5)
WBC # FLD AUTO: 10.2 K/UL — SIGNIFICANT CHANGE UP (ref 3.8–10.5)

## 2017-09-09 PROCEDURE — 71010: CPT | Mod: 26

## 2017-09-09 PROCEDURE — 99233 SBSQ HOSP IP/OBS HIGH 50: CPT

## 2017-09-09 RX ADMIN — ALBUTEROL 2.5 MILLIGRAM(S): 90 AEROSOL, METERED ORAL at 08:34

## 2017-09-09 RX ADMIN — LIDOCAINE 1 PATCH: 4 CREAM TOPICAL at 02:00

## 2017-09-09 RX ADMIN — SODIUM CHLORIDE 75 MILLILITER(S): 9 INJECTION, SOLUTION INTRAVENOUS at 07:00

## 2017-09-09 RX ADMIN — HEPARIN SODIUM 5000 UNIT(S): 5000 INJECTION INTRAVENOUS; SUBCUTANEOUS at 21:17

## 2017-09-09 RX ADMIN — PIPERACILLIN AND TAZOBACTAM 25 GRAM(S): 4; .5 INJECTION, POWDER, LYOPHILIZED, FOR SOLUTION INTRAVENOUS at 17:31

## 2017-09-09 RX ADMIN — Medication 1 APPLICATION(S): at 17:35

## 2017-09-09 RX ADMIN — HEPARIN SODIUM 5000 UNIT(S): 5000 INJECTION INTRAVENOUS; SUBCUTANEOUS at 11:12

## 2017-09-09 RX ADMIN — RISPERIDONE 0.25 MILLIGRAM(S): 4 TABLET ORAL at 21:18

## 2017-09-09 RX ADMIN — SODIUM CHLORIDE 75 MILLILITER(S): 9 INJECTION, SOLUTION INTRAVENOUS at 16:18

## 2017-09-09 RX ADMIN — Medication 1 APPLICATION(S): at 06:00

## 2017-09-09 RX ADMIN — PIPERACILLIN AND TAZOBACTAM 25 GRAM(S): 4; .5 INJECTION, POWDER, LYOPHILIZED, FOR SOLUTION INTRAVENOUS at 06:00

## 2017-09-09 RX ADMIN — ALBUTEROL 2.5 MILLIGRAM(S): 90 AEROSOL, METERED ORAL at 19:27

## 2017-09-09 NOTE — PROGRESS NOTE ADULT - CARDIOVASCULAR
Regular rate & rhythm, normal S1, S2; no murmurs, gallops or rubs; no S3, S4

## 2017-09-09 NOTE — PROGRESS NOTE ADULT - NEUROLOGICAL
Alert & oriented; no sensory, motor or coordination deficits, normal reflexes
detailed exam

## 2017-09-09 NOTE — PROGRESS NOTE ADULT - GASTROINTESTINAL
detailed exam
Soft, non-tender, no hepatosplenomegaly, normal bowel sounds
detailed exam
Soft, non-tender, no hepatosplenomegaly, normal bowel sounds

## 2017-09-09 NOTE — PROGRESS NOTE ADULT - RESPIRATORY
Breath Sounds equal & clear to percussion & auscultation, no accessory muscle use

## 2017-09-09 NOTE — PROGRESS NOTE ADULT - ASSESSMENT
88 male with    1. acute encephalopathy :  metabolic encephalopathy- c/w ivf. hyponatremia improved.    2. dementia with behavioral disturbance: risperdal prn. f/up neuro recs    3. BRAYDEN/ATN due to dehydration: renal dose meds and avoid nephrotoxics. monitor intake and output on IVF. f/up renal recs    4. aspiration pneumonia- bilateral lower lobe-  started on iv zosyn on 9/3. plan-  as per pulmonary.    5. poor dentition/aspiration risk: swallow eval. NPO for now    6. deconditioning: PT/Nutrition eval.    7. heparin SQ for dvt ppx    8. dispo: rehab  when acute medical issues resolved.  poor prognosis.  DNR.   9.Skin rashes- likly due to viral infection. will apply hydrocortisone cream locally.      .

## 2017-09-09 NOTE — PROGRESS NOTE ADULT - SUBJECTIVE AND OBJECTIVE BOX
Date/Time Patient Seen:  		  Referring MD:   Data Reviewed	       Patient is a 88y old  Male who presents with a chief complaint of lethargy/AMS (07 Sep 2017 15:17)  in bed  seen and examined  vs and meds reviewed      Subjective/HPI     PAST MEDICAL & SURGICAL HISTORY:  Dementia with behavioral disturbance  No pertinent past medical history  No significant past surgical history        Medication list         MEDICATIONS  (STANDING):  heparin  Injectable 5000 Unit(s) SubCutaneous every 12 hours  risperiDONE   Tablet 0.25 milliGRAM(s) Oral at bedtime  dextrose 5%. 1000 milliLiter(s) (75 mL/Hr) IV Continuous <Continuous>  ALBUTerol    0.083% 2.5 milliGRAM(s) Nebulizer every 12 hours  hydrocortisone 1% Cream 1 Application(s) Topical two times a day  lidocaine   Patch 1 Patch Transdermal daily    MEDICATIONS  (PRN):  acetaminophen    Suspension. 650 milliGRAM(s) Oral every 8 hours PRN Moderate Pain (4 - 6)  acetaminophen  Suppository 650 milliGRAM(s) Rectal every 6 hours PRN For Temp greater than 38 C (100.4 F)  lidocaine 5% Ointment 1 Application(s) Topical daily PRN to viral rash on Back         Vitals log        ICU Vital Signs Last 24 Hrs  T(C): 36.9 (09 Sep 2017 16:06), Max: 37.1 (09 Sep 2017 12:01)  T(F): 98.5 (09 Sep 2017 16:06), Max: 98.8 (09 Sep 2017 12:01)  HR: 69 (09 Sep 2017 16:00) (65 - 89)  BP: 121/64 (09 Sep 2017 16:00) (108/55 - 140/80)  BP(mean): 81 (09 Sep 2017 16:00) (70 - 89)  ABP: --  ABP(mean): --  RR: 15 (09 Sep 2017 16:00) (14 - 32)  SpO2: 100% (09 Sep 2017 16:00) (100% - 100%)           Input and Output:  I&O's Detail    08 Sep 2017 07:01  -  09 Sep 2017 07:00  --------------------------------------------------------  IN:    dextrose 5%.: 1725 mL    IV PiggyBack: 200 mL    Oral Fluid: 275 mL  Total IN: 2200 mL    OUT:  Total OUT: 0 mL    Total NET: 2200 mL      09 Sep 2017 07:01  -  09 Sep 2017 18:13  --------------------------------------------------------  IN:    dextrose 5%.: 825 mL    IV PiggyBack: 100 mL  Total IN: 925 mL    OUT:  Total OUT: 0 mL    Total NET: 925 mL          Lab Data                        12.3   10.2  )-----------( 287      ( 09 Sep 2017 06:09 )             37.3     09-09    136  |  105  |  16  ----------------------------<  97  4.6   |  22  |  1.89<H>    Ca    8.0<L>      09 Sep 2017 06:09              Review of Systems	      Objective     Physical Examination    head at  heart - s1s2  lungs - dec BS      Pertinent Lab findings & Imaging      Renea:  NO   Adequate UO     I&O's Detail    08 Sep 2017 07:01  -  09 Sep 2017 07:00  --------------------------------------------------------  IN:    dextrose 5%.: 1725 mL    IV PiggyBack: 200 mL    Oral Fluid: 275 mL  Total IN: 2200 mL    OUT:  Total OUT: 0 mL    Total NET: 2200 mL      09 Sep 2017 07:01  -  09 Sep 2017 18:13  --------------------------------------------------------  IN:    dextrose 5%.: 825 mL    IV PiggyBack: 100 mL  Total IN: 925 mL    OUT:  Total OUT: 0 mL    Total NET: 925 mL               Discussed with:     Cultures:	        Radiology

## 2017-09-09 NOTE — PROGRESS NOTE ADULT - SUBJECTIVE AND OBJECTIVE BOX
Patient is a 88y old  Male who presents with a chief complaint of lethargy/AMS (07 Sep 2017 15:17)      INTERVAL HPI/OVERNIGHT EVENTS:  Pt is seen and examined.  pt states he feels ok.  +hard of hearing.    Pain Location & Control:     MEDICATIONS  (STANDING):  heparin  Injectable 5000 Unit(s) SubCutaneous every 12 hours  risperiDONE   Tablet 0.25 milliGRAM(s) Oral at bedtime  dextrose 5%. 1000 milliLiter(s) (75 mL/Hr) IV Continuous <Continuous>  piperacillin/tazobactam IVPB. 3.375 Gram(s) IV Intermittent every 12 hours  ALBUTerol    0.083% 2.5 milliGRAM(s) Nebulizer every 12 hours  hydrocortisone 1% Cream 1 Application(s) Topical two times a day  lidocaine   Patch 1 Patch Transdermal daily    MEDICATIONS  (PRN):  acetaminophen    Suspension. 650 milliGRAM(s) Oral every 8 hours PRN Moderate Pain (4 - 6)  acetaminophen  Suppository 650 milliGRAM(s) Rectal every 6 hours PRN For Temp greater than 38 C (100.4 F)  lidocaine 5% Ointment 1 Application(s) Topical daily PRN to viral rash on Back      Allergies    No Known Allergies    Intolerances            Vital Signs Last 24 Hrs  T(C): 36.8 (09 Sep 2017 08:00), Max: 36.9 (08 Sep 2017 20:02)  T(F): 98.2 (09 Sep 2017 08:00), Max: 98.4 (08 Sep 2017 20:02)  HR: 65 (09 Sep 2017 10:00) (65 - 93)  BP: 140/80 (09 Sep 2017 08:00) (108/55 - 178/69)  BP(mean): 89 (08 Sep 2017 21:57) (70 - 101)  RR: 15 (09 Sep 2017 10:00) (15 - 32)  SpO2: 100% (09 Sep 2017 10:00) (99% - 100%)        LABS:                        12.3   10.2  )-----------( 287      ( 09 Sep 2017 06:09 )             37.3     09 Sep 2017 06:09    136    |  105    |  16     ----------------------------<  97     4.6     |  22     |  1.89     Ca    8.0        09 Sep 2017 06:09          RADIOLOGY & ADDITIONAL TESTS:    Imaging Personally Reviewed:  [ x] YES  [ ] NO    Consultant(s) Notes Reviewed:  [x ] YES  [ ] NO    Care Discussed with Consultants/Other Providers [ ] YES  [ ] NO

## 2017-09-09 NOTE — PROGRESS NOTE ADULT - VASCULAR
Equal and normal pulses (carotid, femoral, dorsalis pedis)

## 2017-09-09 NOTE — PROGRESS NOTE ADULT - EYES
EOMI; PERRL; no drainage or redness

## 2017-09-09 NOTE — PROGRESS NOTE ADULT - EXTREMITIES
No cyanosis, clubbing or edema
detailed exam
No cyanosis, clubbing or edema
No cyanosis, clubbing or edema

## 2017-09-09 NOTE — PROGRESS NOTE ADULT - SUBJECTIVE AND OBJECTIVE BOX
Neurology follow up note    AZUL VILLA88yMale      Interval History:    Patient feels ok no new complaints.    MEDICATIONS    heparin  Injectable 5000 Unit(s) SubCutaneous every 12 hours  risperiDONE   Tablet 0.25 milliGRAM(s) Oral at bedtime  acetaminophen    Suspension. 650 milliGRAM(s) Oral every 8 hours PRN  dextrose 5%. 1000 milliLiter(s) IV Continuous <Continuous>  acetaminophen  Suppository 650 milliGRAM(s) Rectal every 6 hours PRN  piperacillin/tazobactam IVPB. 3.375 Gram(s) IV Intermittent every 12 hours  ALBUTerol    0.083% 2.5 milliGRAM(s) Nebulizer every 12 hours  lidocaine 5% Ointment 1 Application(s) Topical daily PRN  hydrocortisone 1% Cream 1 Application(s) Topical two times a day  lidocaine   Patch 1 Patch Transdermal daily      Allergies    No Known Allergies    Intolerances            Vital Signs Last 24 Hrs  T(C): 37.1 (09 Sep 2017 12:01), Max: 37.1 (09 Sep 2017 12:01)  T(F): 98.8 (09 Sep 2017 12:01), Max: 98.8 (09 Sep 2017 12:01)  HR: 65 (09 Sep 2017 10:00) (65 - 93)  BP: 140/80 (09 Sep 2017 08:00) (108/55 - 178/69)  BP(mean): 89 (08 Sep 2017 21:57) (70 - 101)  RR: 15 (09 Sep 2017 10:00) (15 - 32)  SpO2: 100% (09 Sep 2017 10:00) (99% - 100%)       REVIEW OF SYSTEMS: Limit or unable to obtain secondary to patient's poor mental status.        On Neurological Examination:    Mental Status - Patient is alert, awake,       Follow simple commands      Speech -  say few words     Cranial Nerves - Pupils 3 mm equal and reactive to light,   extraocular eye movements intact.   smile symmetric  intact bilateral NLF    Motor Exam -     positive movement of all 4 extremities    Muscle tone - is normal all over.  No asymmetry is seen.      Sensory    Bilateral intact to light touch    Gait -  normal  ataxia     GENERAL Exam: Nontoxic , No Acute Distress   	  HEENT:  normocephalic, atraumatic  		  LUNGS:   Decreased bilaterally  	  HEART: Normal S1S2   No murmur RRR        	  GI/ ABDOMEN:  Soft  Non tender    EXTREMITIES:   No Edema  No Clubbing  No Cyanosis No Edema    MUSCULOSKELETAL: Normal Range of Motion  	   SKIN: + rash better              LABS:  CBC Full  -  ( 09 Sep 2017 06:09 )  WBC Count : 10.2 K/uL  Hemoglobin : 12.3 g/dL  Hematocrit : 37.3 %  Platelet Count - Automated : 287 K/uL  Mean Cell Volume : 94.7 fl  Mean Cell Hemoglobin : 31.3 pg  Mean Cell Hemoglobin Concentration : 33.1 gm/dL  Auto Neutrophil # : x  Auto Lymphocyte # : x  Auto Monocyte # : x  Auto Eosinophil # : x  Auto Basophil # : x  Auto Neutrophil % : x  Auto Lymphocyte % : x  Auto Monocyte % : x  Auto Eosinophil % : x  Auto Basophil % : x      09-09    136  |  105  |  16  ----------------------------<  97  4.6   |  22  |  1.89<H>    Ca    8.0<L>      09 Sep 2017 06:09      Hemoglobin A1C:       Vitamin B12         RADIOLOGY    ANALYSIS AND PLAN:  An 88-year-old with episode of changes in mental status and history of dementia.  1.	For changes in mental status, most likely toxic metabolic encephalopathy, which is multifactorial, possibly secondary to underlying viral-type syndrome along with hypernatremia and acute kidney injury.  2.	Recommend IV fluid hydration and monitor renal function.  3.	Recommend antibiotics as needed.  4.	Monitor patient's electrolytes.  5.	aspiration  precautions   6.	monitor oral intake   7.	PT eval if possible  8.	renal function improving   9.	monitor oral intake   10.	For history of dementia, for now we will recommend supportive therapy.  11.	neck pain will try lidoderm patch better   I spoke with daughter Valery at [phone] 9/6/17. She is aware that in the hospital setting, patient may become more disoriented.  He does have a history of sundowning and if necessary she will come stay with him.  We will continue to follow.  Would continue to follow.  30 minutes spent on total encounter; more than 50% of the visit was spent counseling and/or coordinating care by the attending physician.

## 2017-09-09 NOTE — PROGRESS NOTE ADULT - COMMENTS
unable to obtain as pt is confused.
unable to obtain as pt is confused. states he has no pain and feels better.
unable to obtain as pt is confused.
unable to obtain as pt is confused. states he has no pain and feels better. + hard of hearing.

## 2017-09-10 PROCEDURE — 71010: CPT | Mod: 26

## 2017-09-10 PROCEDURE — 99233 SBSQ HOSP IP/OBS HIGH 50: CPT

## 2017-09-10 RX ADMIN — LIDOCAINE 1 PATCH: 4 CREAM TOPICAL at 23:00

## 2017-09-10 RX ADMIN — RISPERIDONE 0.25 MILLIGRAM(S): 4 TABLET ORAL at 21:41

## 2017-09-10 RX ADMIN — ALBUTEROL 2.5 MILLIGRAM(S): 90 AEROSOL, METERED ORAL at 09:07

## 2017-09-10 RX ADMIN — HEPARIN SODIUM 5000 UNIT(S): 5000 INJECTION INTRAVENOUS; SUBCUTANEOUS at 21:41

## 2017-09-10 RX ADMIN — SODIUM CHLORIDE 75 MILLILITER(S): 9 INJECTION, SOLUTION INTRAVENOUS at 17:13

## 2017-09-10 RX ADMIN — HEPARIN SODIUM 5000 UNIT(S): 5000 INJECTION INTRAVENOUS; SUBCUTANEOUS at 11:12

## 2017-09-10 RX ADMIN — LIDOCAINE 1 PATCH: 4 CREAM TOPICAL at 11:12

## 2017-09-10 RX ADMIN — Medication 1 APPLICATION(S): at 05:14

## 2017-09-10 RX ADMIN — ALBUTEROL 2.5 MILLIGRAM(S): 90 AEROSOL, METERED ORAL at 19:33

## 2017-09-10 RX ADMIN — Medication 1 APPLICATION(S): at 17:14

## 2017-09-10 NOTE — PROGRESS NOTE ADULT - SUBJECTIVE AND OBJECTIVE BOX
Patient is a 88y old  Male who presents with a chief complaint of lethargy/AMS (07 Sep 2017 15:17)      INTERVAL History of Present Illness/OVERNIGHT EVENTS: no new issues. presumptive discharge plan tomorrow.    MEDICATIONS  (STANDING):  heparin  Injectable 5000 Unit(s) SubCutaneous every 12 hours  risperiDONE   Tablet 0.25 milliGRAM(s) Oral at bedtime  dextrose 5%. 1000 milliLiter(s) (75 mL/Hr) IV Continuous <Continuous>  ALBUTerol    0.083% 2.5 milliGRAM(s) Nebulizer every 12 hours  hydrocortisone 1% Cream 1 Application(s) Topical two times a day  lidocaine   Patch 1 Patch Transdermal daily    MEDICATIONS  (PRN):  acetaminophen    Suspension. 650 milliGRAM(s) Oral every 8 hours PRN Moderate Pain (4 - 6)  acetaminophen  Suppository 650 milliGRAM(s) Rectal every 6 hours PRN For Temp greater than 38 C (100.4 F)  lidocaine 5% Ointment 1 Application(s) Topical daily PRN to viral rash on Back      Allergies    No Known Allergies    Intolerances        REVIEW OF SYSTEMS:  Negative unless otherwise specified above.    Vital Signs Last 24 Hrs  T(C): 36.8 (10 Sep 2017 12:15), Max: 36.9 (09 Sep 2017 16:06)  T(F): 98.2 (10 Sep 2017 12:15), Max: 98.5 (09 Sep 2017 16:06)  HR: 90 (10 Sep 2017 12:00) (69 - 94)  BP: 135/77 (10 Sep 2017 12:00) (103/59 - 137/64)  BP(mean): 94 (10 Sep 2017 12:00) (72 - 105)  RR: 28 (10 Sep 2017 12:00) (13 - 29)  SpO2: 98% (10 Sep 2017 12:00) (95% - 100%)        PHYSICAL EXAM:  GENERAL: sleeping  HEAD:  Atraumatic, Normocephalic  EYES: conjunctiva and sclera clear  ENMT: Moist mucous membranes  NECK: Supple, No Jugular venous distension  CHEST/LUNG: Clear to auscultation bilaterally; No rales, rhonchi, wheezing, or rubs  HEART: Regular rate and rhythm; No murmurs, rubs, or gallops  ABDOMEN: Soft, Nontender, Nondistended; Bowel sounds present  EXTREMITIES:  2+ Peripheral Pulses, No clubbing or cyanosis  LYMPH: No lymphadenopathy noted  SKIN: No rashes or lesions  NERVOUS SYSTEM:  limited interaction as pt sleepy but arousable      LABS:      Ca    8.0        09 Sep 2017 06:09          CAPILLARY BLOOD GLUCOSE          RADIOLOGY & ADDITIONAL TESTS:    Images reviewed personally    Consultant Notes Reviewed and Care Discussed with relevant Consultants/Other Providers.

## 2017-09-10 NOTE — PROGRESS NOTE ADULT - SUBJECTIVE AND OBJECTIVE BOX
Date/Time Patient Seen:  		  Referring MD:   Data Reviewed	       Patient is a 88y old  Male who presents with a chief complaint of lethargy/AMS (07 Sep 2017 15:17)  in bed  seen and examined  vs and meds reviewed      Subjective/HPI     PAST MEDICAL & SURGICAL HISTORY:  Dementia with behavioral disturbance  No pertinent past medical history  No significant past surgical history        Medication list         MEDICATIONS  (STANDING):  heparin  Injectable 5000 Unit(s) SubCutaneous every 12 hours  risperiDONE   Tablet 0.25 milliGRAM(s) Oral at bedtime  dextrose 5%. 1000 milliLiter(s) (75 mL/Hr) IV Continuous <Continuous>  ALBUTerol    0.083% 2.5 milliGRAM(s) Nebulizer every 12 hours  hydrocortisone 1% Cream 1 Application(s) Topical two times a day  lidocaine   Patch 1 Patch Transdermal daily    MEDICATIONS  (PRN):  acetaminophen    Suspension. 650 milliGRAM(s) Oral every 8 hours PRN Moderate Pain (4 - 6)  acetaminophen  Suppository 650 milliGRAM(s) Rectal every 6 hours PRN For Temp greater than 38 C (100.4 F)  lidocaine 5% Ointment 1 Application(s) Topical daily PRN to viral rash on Back         Vitals log        ICU Vital Signs Last 24 Hrs  T(C): 36.8 (10 Sep 2017 04:14), Max: 37.1 (09 Sep 2017 12:01)  T(F): 98.3 (10 Sep 2017 04:14), Max: 98.8 (09 Sep 2017 12:01)  HR: 92 (10 Sep 2017 06:00) (65 - 92)  BP: 134/65 (10 Sep 2017 06:00) (103/59 - 140/80)  BP(mean): 84 (10 Sep 2017 04:00) (72 - 85)  ABP: --  ABP(mean): --  RR: 24 (10 Sep 2017 06:00) (14 - 28)  SpO2: 100% (10 Sep 2017 06:00) (98% - 100%)           Input and Output:  I&O's Detail    08 Sep 2017 07:01  -  09 Sep 2017 07:00  --------------------------------------------------------  IN:    dextrose 5%.: 1725 mL    IV PiggyBack: 200 mL    Oral Fluid: 275 mL  Total IN: 2200 mL    OUT:  Total OUT: 0 mL    Total NET: 2200 mL      09 Sep 2017 07:01  -  10 Sep 2017 06:30  --------------------------------------------------------  IN:    dextrose 5%.: 1650 mL    IV PiggyBack: 100 mL  Total IN: 1750 mL    OUT:  Total OUT: 0 mL    Total NET: 1750 mL          Lab Data                        12.3   10.2  )-----------( 287      ( 09 Sep 2017 06:09 )             37.3     09-09    136  |  105  |  16  ----------------------------<  97  4.6   |  22  |  1.89<H>    Ca    8.0<L>      09 Sep 2017 06:09              Review of Systems	      Objective     Physical Examination    head at  heart - s1s2  lungs - dec BS  abd - soft      Pertinent Lab findings & Imaging      Renea:  NO   Adequate UO     I&O's Detail    08 Sep 2017 07:01  -  09 Sep 2017 07:00  --------------------------------------------------------  IN:    dextrose 5%.: 1725 mL    IV PiggyBack: 200 mL    Oral Fluid: 275 mL  Total IN: 2200 mL    OUT:  Total OUT: 0 mL    Total NET: 2200 mL      09 Sep 2017 07:01  -  10 Sep 2017 06:30  --------------------------------------------------------  IN:    dextrose 5%.: 1650 mL    IV PiggyBack: 100 mL  Total IN: 1750 mL    OUT:  Total OUT: 0 mL    Total NET: 1750 mL               Discussed with:     Cultures:	        Radiology

## 2017-09-10 NOTE — PROGRESS NOTE ADULT - ASSESSMENT
88 male with    1. acut toxic metabolic encephalopathy: resolved    2. dementia with behavioral disturbance: risperdal prn. f/up neuro recs    3. BRAYDEN/ATN due to dehydration: renal dose meds and avoid nephrotoxics. monitor intake and output on IVF. f/up renal recs    4. aspiration pneumonia: f/up CXR. may need resumption of abx if new infiltrate.    5. deconditioning: PT/Nutrition eval.    6. heparin SQ for dvt ppx    7. dispo: rehab when acute medical issues resolved.    DNR.    d/w RN plan of care.        .

## 2017-09-10 NOTE — PROGRESS NOTE ADULT - SUBJECTIVE AND OBJECTIVE BOX
Neurology follow up note    AZUL VILLA88yMale      Interval History:    Patient feels ok no new complaints.    MEDICATIONS    heparin  Injectable 5000 Unit(s) SubCutaneous every 12 hours  risperiDONE   Tablet 0.25 milliGRAM(s) Oral at bedtime  acetaminophen    Suspension. 650 milliGRAM(s) Oral every 8 hours PRN  dextrose 5%. 1000 milliLiter(s) IV Continuous <Continuous>  acetaminophen  Suppository 650 milliGRAM(s) Rectal every 6 hours PRN  ALBUTerol    0.083% 2.5 milliGRAM(s) Nebulizer every 12 hours  lidocaine 5% Ointment 1 Application(s) Topical daily PRN  hydrocortisone 1% Cream 1 Application(s) Topical two times a day  lidocaine   Patch 1 Patch Transdermal daily      Allergies    No Known Allergies    Intolerances            Vital Signs Last 24 Hrs  T(C): 36.6 (10 Sep 2017 08:14), Max: 37.1 (09 Sep 2017 12:01)  T(F): 97.8 (10 Sep 2017 08:14), Max: 98.8 (09 Sep 2017 12:01)  HR: 85 (10 Sep 2017 09:26) (65 - 94)  BP: 136/93 (10 Sep 2017 08:00) (103/59 - 137/64)  BP(mean): 105 (10 Sep 2017 08:00) (72 - 105)  RR: 13 (10 Sep 2017 08:14) (13 - 29)  SpO2: 100% (10 Sep 2017 09:26) (95% - 100%)       REVIEW OF SYSTEMS: Limit or unable to obtain secondary to patient's poor mental status.        On Neurological Examination:    Mental Status - Patient is alert, awake,       Follow simple commands      Speech -  say few words     Cranial Nerves - Pupils 3 mm equal and reactive to light,   extraocular eye movements intact.   smile symmetric  intact bilateral NLF    Motor Exam -     positive movement of all 4 extremities    Muscle tone - is normal all over.  No asymmetry is seen.      Sensory    Bilateral intact to light touch    Gait -  normal  ataxia     GENERAL Exam: Nontoxic , No Acute Distress   	  HEENT:  normocephalic, atraumatic  		  LUNGS:   Decreased bilaterally  	  HEART: Normal S1S2   No murmur RRR        	  GI/ ABDOMEN:  Soft  Non tender    EXTREMITIES:   No Edema  No Clubbing  No Cyanosis No Edema    MUSCULOSKELETAL: Normal Range of Motion  	   SKIN: + rash better         LABS:  CBC Full  -  ( 09 Sep 2017 06:09 )  WBC Count : 10.2 K/uL  Hemoglobin : 12.3 g/dL  Hematocrit : 37.3 %  Platelet Count - Automated : 287 K/uL  Mean Cell Volume : 94.7 fl  Mean Cell Hemoglobin : 31.3 pg  Mean Cell Hemoglobin Concentration : 33.1 gm/dL  Auto Neutrophil # : x  Auto Lymphocyte # : x  Auto Monocyte # : x  Auto Eosinophil # : x  Auto Basophil # : x  Auto Neutrophil % : x  Auto Lymphocyte % : x  Auto Monocyte % : x  Auto Eosinophil % : x  Auto Basophil % : x      09-09    136  |  105  |  16  ----------------------------<  97  4.6   |  22  |  1.89<H>    Ca    8.0<L>      09 Sep 2017 06:09      Hemoglobin A1C:       Vitamin B12         RADIOLOGY    ANALYSIS AND PLAN:  An 88-year-old with episode of changes in mental status and history of dementia.  1.	For changes in mental status, most likely toxic metabolic encephalopathy, which is multifactorial, possibly secondary to underlying viral-type syndrome along with hypernatremia and acute kidney injury.  2.	Recommend IV fluid hydration and monitor renal function.  3.	Recommend antibiotics as needed.  4.	Monitor patient's electrolytes.  5.	aspiration  precautions   6.	monitor oral intake   7.	PT eval if possible  8.	renal function improving   9.	monitor oral intake   10.	For history of dementia, for now we will recommend supportive therapy.  11.	neck pain will try lidoderm patch better   12.	rehab  I spoke with daughter Valery at [phone] 9/10/17. She is aware that in the hospital setting, patient may become more disoriented.  He does have a history of sundowning and if necessary she will come stay with him.  We will continue to follow.  Would continue to follow.  20 minutes spent on total encounter; more than 50% of the visit was spent counseling and/or coordinating care by the attending physician.

## 2017-09-11 VITALS
HEART RATE: 87 BPM | RESPIRATION RATE: 16 BRPM | DIASTOLIC BLOOD PRESSURE: 64 MMHG | SYSTOLIC BLOOD PRESSURE: 116 MMHG | OXYGEN SATURATION: 99 %

## 2017-09-11 LAB
ALBUMIN SERPL ELPH-MCNC: 2 G/DL — LOW (ref 3.3–5)
ALP SERPL-CCNC: 71 U/L — SIGNIFICANT CHANGE UP (ref 30–120)
ALT FLD-CCNC: 12 U/L DA — SIGNIFICANT CHANGE UP (ref 10–60)
ANION GAP SERPL CALC-SCNC: 9 MMOL/L — SIGNIFICANT CHANGE UP (ref 5–17)
AST SERPL-CCNC: 22 U/L — SIGNIFICANT CHANGE UP (ref 10–40)
BILIRUB SERPL-MCNC: 0.4 MG/DL — SIGNIFICANT CHANGE UP (ref 0.2–1.2)
BUN SERPL-MCNC: 14 MG/DL — SIGNIFICANT CHANGE UP (ref 7–23)
CALCIUM SERPL-MCNC: 7.8 MG/DL — LOW (ref 8.4–10.5)
CHLORIDE SERPL-SCNC: 104 MMOL/L — SIGNIFICANT CHANGE UP (ref 96–108)
CO2 SERPL-SCNC: 25 MMOL/L — SIGNIFICANT CHANGE UP (ref 22–31)
CREAT SERPL-MCNC: 1.87 MG/DL — HIGH (ref 0.5–1.3)
GLUCOSE SERPL-MCNC: 84 MG/DL — SIGNIFICANT CHANGE UP (ref 70–99)
HCT VFR BLD CALC: 36 % — LOW (ref 39–50)
HGB BLD-MCNC: 12.3 G/DL — LOW (ref 13–17)
MCHC RBC-ENTMCNC: 31.2 PG — SIGNIFICANT CHANGE UP (ref 27–34)
MCHC RBC-ENTMCNC: 34.3 GM/DL — SIGNIFICANT CHANGE UP (ref 32–36)
MCV RBC AUTO: 91.2 FL — SIGNIFICANT CHANGE UP (ref 80–100)
NT-PROBNP SERPL-SCNC: 3010 PG/ML — HIGH (ref 0–450)
PLATELET # BLD AUTO: 312 K/UL — SIGNIFICANT CHANGE UP (ref 150–400)
POTASSIUM SERPL-MCNC: 3.8 MMOL/L — SIGNIFICANT CHANGE UP (ref 3.5–5.3)
POTASSIUM SERPL-SCNC: 3.8 MMOL/L — SIGNIFICANT CHANGE UP (ref 3.5–5.3)
PROCALCITONIN SERPL-MCNC: 0.25 NG/ML — HIGH (ref 0–0.04)
PROT SERPL-MCNC: 5.9 G/DL — LOW (ref 6–8.3)
RBC # BLD: 3.95 M/UL — LOW (ref 4.2–5.8)
RBC # FLD: 15.8 % — HIGH (ref 10.3–14.5)
SODIUM SERPL-SCNC: 138 MMOL/L — SIGNIFICANT CHANGE UP (ref 135–145)
WBC # BLD: 8.3 K/UL — SIGNIFICANT CHANGE UP (ref 3.8–10.5)
WBC # FLD AUTO: 8.3 K/UL — SIGNIFICANT CHANGE UP (ref 3.8–10.5)

## 2017-09-11 PROCEDURE — 99239 HOSP IP/OBS DSCHRG MGMT >30: CPT

## 2017-09-11 RX ORDER — HYDROCORTISONE 1 %
1 OINTMENT (GRAM) TOPICAL
Qty: 1 | Refills: 0 | OUTPATIENT
Start: 2017-09-11

## 2017-09-11 RX ORDER — ACETAMINOPHEN 500 MG
20.31 TABLET ORAL
Qty: 100 | Refills: 0 | OUTPATIENT
Start: 2017-09-11

## 2017-09-11 RX ORDER — RISPERIDONE 4 MG/1
0 TABLET ORAL
Qty: 0 | Refills: 0 | COMMUNITY

## 2017-09-11 RX ORDER — RISPERIDONE 4 MG/1
1 TABLET ORAL
Qty: 1 | Refills: 0 | OUTPATIENT
Start: 2017-09-11

## 2017-09-11 RX ORDER — LIDOCAINE 4 G/100G
1 CREAM TOPICAL
Qty: 1 | Refills: 0 | OUTPATIENT
Start: 2017-09-11

## 2017-09-11 RX ADMIN — Medication 1 APPLICATION(S): at 05:54

## 2017-09-11 RX ADMIN — HEPARIN SODIUM 5000 UNIT(S): 5000 INJECTION INTRAVENOUS; SUBCUTANEOUS at 10:06

## 2017-09-11 RX ADMIN — ALBUTEROL 2.5 MILLIGRAM(S): 90 AEROSOL, METERED ORAL at 07:53

## 2017-09-11 RX ADMIN — LIDOCAINE 1 PATCH: 4 CREAM TOPICAL at 12:00

## 2017-09-11 NOTE — PROGRESS NOTE ADULT - PROBLEM SELECTOR PLAN 3
asp prec  HOB elevation   oral care  skin care  monitor sat  keep sat > 90 pct
caution with volume overload  on IVF  am labs pending  monitor sat and resp rate  I and O  ICU supportive care  completed ABX for asp pna
monitor for volume overload  on IVF  I and O  monitor sat and resp rate
asp prec  HOB elevation  on emp ABX  am labs pending  ID following  sx tx for URI  chest pt  promote mucociliary clearance  will need rpt Imaging  prognosis poor  pt is DNR
caution with IVF  I and O  monitor for pulm congestion
support  Rx regimen  monitor labs  nutrition  prognosis poor  pt is DNR
supportive care  ADL assist  fall precs  oral and skin care  monitor labs  would transfer pt to [facility]
supportive care  asst with ADL and nutrition intake  HOB elevation  may need placement  pt is DNR  transfer to [facility]
supportive care  fall prec  asp prec  HOB elevation   pt is DNR
suspect volume overload  check proBNP  may need diuresis gentle PRN  I and O monitoring  cont icu supportive care  pt is DNR  am labs pending
Likely related to dehydration. Improving. Trend BUN/Cr. Avoid nephrotoxic agents. F/u renal US. Nephrology following.

## 2017-09-11 NOTE — PROGRESS NOTE ADULT - SUBJECTIVE AND OBJECTIVE BOX
Patient is a 88y old  Male who presents with a chief complaint of lethargy/AMS (07 Sep 2017 15:17)      INTERVAL History of Present Illness/OVERNIGHT EVENTS: eating meal without issues.    MEDICATIONS  (STANDING):  heparin  Injectable 5000 Unit(s) SubCutaneous every 12 hours  risperiDONE   Tablet 0.25 milliGRAM(s) Oral at bedtime  ALBUTerol    0.083% 2.5 milliGRAM(s) Nebulizer every 12 hours  hydrocortisone 1% Cream 1 Application(s) Topical two times a day  lidocaine   Patch 1 Patch Transdermal daily    MEDICATIONS  (PRN):  acetaminophen    Suspension. 650 milliGRAM(s) Oral every 8 hours PRN Moderate Pain (4 - 6)  acetaminophen  Suppository 650 milliGRAM(s) Rectal every 6 hours PRN For Temp greater than 38 C (100.4 F)  lidocaine 5% Ointment 1 Application(s) Topical daily PRN to viral rash on Back      Allergies    No Known Allergies    Intolerances        REVIEW OF SYSTEMS:  Negative unless otherwise specified above.    Vital Signs Last 24 Hrs  T(C): 36.7 (11 Sep 2017 12:38), Max: 37 (10 Sep 2017 20:00)  T(F): 98.1 (11 Sep 2017 12:38), Max: 98.6 (10 Sep 2017 20:00)  HR: 87 (11 Sep 2017 13:03) (76 - 94)  BP: 116/64 (11 Sep 2017 13:03) (100/55 - 138/72)  BP(mean): 78 (11 Sep 2017 13:03) (67 - 91)  RR: 16 (11 Sep 2017 13:03) (16 - 30)  SpO2: 99% (11 Sep 2017 13:03) (97% - 100%)        PHYSICAL EXAM:  GENERAL: No apparent distress, well-groomed, well-developed  HEAD:  Atraumatic, Normocephalic  EYES: conjunctiva and sclera clear  ENMT: hearing loss  NECK: Supple, No Jugular venous distension  CHEST/LUNG: Clear to auscultation bilaterally; No rales, rhonchi, wheezing, or rubs  HEART: Regular rate and rhythm; No murmurs, rubs, or gallops  ABDOMEN: Soft, Nontender, Nondistended; Bowel sounds present  EXTREMITIES:  2+ Peripheral Pulses, No clubbing or cyanosis  LYMPH: No lymphadenopathy noted  SKIN: No rashes or lesions  NERVOUS SYSTEM:  moves all extremeties, baseline mental status      LABS:                        12.3   8.3   )-----------( 312      ( 11 Sep 2017 07:03 )             36.0     11 Sep 2017 07:03    138    |  104    |  14     ----------------------------<  84     3.8     |  25     |  1.87     Ca    7.8        11 Sep 2017 07:03    TPro  5.9    /  Alb  2.0    /  TBili  0.4    /  DBili  x      /  AST  22     /  ALT  12     /  AlkPhos  71     11 Sep 2017 07:03        CAPILLARY BLOOD GLUCOSE          RADIOLOGY & ADDITIONAL TESTS:    Images reviewed personally    Consultant Notes Reviewed and Care Discussed with relevant Consultants/Other Providers.

## 2017-09-11 NOTE — PROGRESS NOTE ADULT - PROBLEM SELECTOR PLAN 4
supportive care  pt is DNR  assist with ADL  nutrition  dc plan likely to SHRUTI
BRAYDEN  CKD  monitor labs  renal following  caution with volume overload  I and O  prognosis poor
supportive care  fall prec  oral and skin hygiene  dysph diet
unclear etiol  cx pending  ID eval noted  serial labs  supportive regimen  prognosis guarded  asp prec
Continue risperidone.

## 2017-09-11 NOTE — PROGRESS NOTE ADULT - PROBLEM SELECTOR PLAN 2
BRAYDEN  CKD  on IVF  monitor renal function, daily labs  overall better
BRAYDEN  I and O  caution for volume overload  cr trending down
supportive care  oral care  skin care  HOB elevation  ADL assist
AMS  Dementia  treat underlying disorder  supportive care  oral and skin care  fall prec
BRAYDEN  CKD likely   on IVF  I and O noted, suspect volume overload
BRAYDEN  IVF  I and O  serial labs
asp prec  oral care  skin care  HOB elevation  on ABX  ID following  monitor labs  chest pt   suction PRN  prognosis guarded
monitor for volume overload  I and O  transfer to [facility]
monitor labs  IVF  renal follow up  am labs pending  prognosis poor  pt is DNR
supportive care  skin and oral care  ADL assist  out of bed as tolerated  pt is DNR
Likely related to hypernatremia as AMS is an acute change from baseline. Continue management as above. CT head neg for acute events. Monitor mental status for acute changes.

## 2017-09-11 NOTE — PROGRESS NOTE ADULT - PROBLEM SELECTOR PROBLEM 2
Acute renal failure, unspecified acute renal failure type
Acute renal failure, unspecified acute renal failure type
Dementia with behavioral disturbance, unspecified dementia type
Acute renal failure, unspecified acute renal failure type
Altered mental status
Aspiration pneumonia
BRAYDEN (acute kidney injury)
BRAYDEN (acute kidney injury)
Dementia with behavioral disturbance
Volume overload
Altered mental status

## 2017-09-11 NOTE — PROGRESS NOTE ADULT - PROBLEM SELECTOR PROBLEM 3
Aspiration pneumonia
Volume overload
Volume overload
Aspiration pneumonia
Dementia with behavioral disturbance
Dementia with behavioral disturbance
Frail elderly
Frail elderly
Volume overload
Volume overload
BRAYDEN (acute kidney injury)

## 2017-09-11 NOTE — PROGRESS NOTE ADULT - PROBLEM SELECTOR PROBLEM 1
Acute renal failure, unspecified acute renal failure type
Dementia with behavioral disturbance, unspecified dementia type
Dementia with behavioral disturbance, unspecified dementia type
CKD (chronic kidney disease) stage 4, GFR 15-29 ml/min
Aspiration pneumonia
Aspiration pneumonia
BRAYDEN (acute kidney injury)
CKD (chronic kidney disease) stage 4, GFR 15-29 ml/min
Dementia with behavioral disturbance
Hypernatremia
CKD (chronic kidney disease) stage 4, GFR 15-29 ml/min
CKD (chronic kidney disease) stage 4, GFR 15-29 ml/min

## 2017-09-11 NOTE — PROGRESS NOTE ADULT - ASSESSMENT
88 male with    1. acute toxic metabolic encephalopathy: resolved    2. dementia with behavioral disturbance: risperdal prn. stable.    3. CKD III: renal dose meds and avoid nephrotoxics. monitor intake and output.    4. aspiration pneumonia: completed antibiotic treatment. no fever or leukocytosis. no cough, good appetite. no clinical symptom of pneumonia.    5. deconditioning: await rehab    6. heparin SQ for dvt ppx    7. dispo: rehab today    DNR.    d/w RN/HCP plan of care.  spent 45 mins on discharge         .

## 2017-09-11 NOTE — PROGRESS NOTE ADULT - SUBJECTIVE AND OBJECTIVE BOX
Date/Time Patient Seen:  		  Referring MD:   Data Reviewed	       Patient is a 88y old  Male who presents with a chief complaint of lethargy/AMS (07 Sep 2017 15:17)  in bed  seen and examined  vs and meds reviewed  on nebs  on IVF      Subjective/HPI     PAST MEDICAL & SURGICAL HISTORY:  Dementia with behavioral disturbance  No pertinent past medical history  No significant past surgical history        Medication list         MEDICATIONS  (STANDING):  heparin  Injectable 5000 Unit(s) SubCutaneous every 12 hours  risperiDONE   Tablet 0.25 milliGRAM(s) Oral at bedtime  dextrose 5%. 1000 milliLiter(s) (75 mL/Hr) IV Continuous <Continuous>  ALBUTerol    0.083% 2.5 milliGRAM(s) Nebulizer every 12 hours  hydrocortisone 1% Cream 1 Application(s) Topical two times a day  lidocaine   Patch 1 Patch Transdermal daily    MEDICATIONS  (PRN):  acetaminophen    Suspension. 650 milliGRAM(s) Oral every 8 hours PRN Moderate Pain (4 - 6)  acetaminophen  Suppository 650 milliGRAM(s) Rectal every 6 hours PRN For Temp greater than 38 C (100.4 F)  lidocaine 5% Ointment 1 Application(s) Topical daily PRN to viral rash on Back         Vitals log        ICU Vital Signs Last 24 Hrs  T(C): 36.5 (11 Sep 2017 04:01), Max: 37 (10 Sep 2017 20:00)  T(F): 97.7 (11 Sep 2017 04:01), Max: 98.6 (10 Sep 2017 20:00)  HR: 82 (11 Sep 2017 04:15) (82 - 94)  BP: 127/70 (11 Sep 2017 04:15) (100/55 - 138/72)  BP(mean): 88 (11 Sep 2017 04:15) (67 - 105)  ABP: --  ABP(mean): --  RR: 19 (11 Sep 2017 04:15) (13 - 30)  SpO2: 99% (11 Sep 2017 04:15) (95% - 100%)           Input and Output:  I&O's Detail    09 Sep 2017 07:01  -  10 Sep 2017 07:00  --------------------------------------------------------  IN:    dextrose 5%.: 1725 mL    IV PiggyBack: 100 mL  Total IN: 1825 mL    OUT:  Total OUT: 0 mL    Total NET: 1825 mL      10 Sep 2017 07:01  -  11 Sep 2017 06:45  --------------------------------------------------------  IN:    dextrose 5%.: 1575 mL    Oral Fluid: 450 mL  Total IN: 2025 mL    OUT:  Total OUT: 0 mL    Total NET: 2025 mL          Lab Data                  Review of Systems	      Objective     Physical Examination    frail  weak  head at  heart - s1s2  lungs - dec BS      Pertinent Lab findings & Imaging      Renea:  NO   Adequate UO     I&O's Detail    09 Sep 2017 07:01  -  10 Sep 2017 07:00  --------------------------------------------------------  IN:    dextrose 5%.: 1725 mL    IV PiggyBack: 100 mL  Total IN: 1825 mL    OUT:  Total OUT: 0 mL    Total NET: 1825 mL      10 Sep 2017 07:01  -  11 Sep 2017 06:45  --------------------------------------------------------  IN:    dextrose 5%.: 1575 mL    Oral Fluid: 450 mL  Total IN: 2025 mL    OUT:  Total OUT: 0 mL    Total NET: 2025 mL               Discussed with:     Cultures:	        Radiology

## 2017-09-11 NOTE — PROGRESS NOTE ADULT - ASSESSMENT
·	Hypernatremia  ·	CKD 3, BRAYDEN  ·	Aspiration pneumonia    Improved sodium levels. Stable renal function. Encourage Po intake as tolerated. D/c planning.

## 2017-09-11 NOTE — PROGRESS NOTE ADULT - PROVIDER SPECIALTY LIST ADULT
Critical Care
Hospitalist
Infectious Disease
Nephrology
Neurology
Pulmonology
Hospitalist
Hospitalist

## 2017-09-11 NOTE — PROGRESS NOTE ADULT - SUBJECTIVE AND OBJECTIVE BOX
Patient is a 88y old  Male who presents with a chief complaint of lethargy/AMS (01 Sep 2017 11:05)      Patient seen in follow up for BRAYDEN, Hypernatremia. Improved renal function.     PAST MEDICAL HISTORY:  Dementia with behavioral disturbance  No pertinent past medical history      MEDICATIONS  (STANDING):  heparin  Injectable 5000 Unit(s) SubCutaneous every 12 hours  risperiDONE   Tablet 0.25 milliGRAM(s) Oral at bedtime  ALBUTerol    0.083% 2.5 milliGRAM(s) Nebulizer every 12 hours  hydrocortisone 1% Cream 1 Application(s) Topical two times a day  lidocaine   Patch 1 Patch Transdermal daily    MEDICATIONS  (PRN):  acetaminophen    Suspension. 650 milliGRAM(s) Oral every 8 hours PRN Moderate Pain (4 - 6)  acetaminophen  Suppository 650 milliGRAM(s) Rectal every 6 hours PRN For Temp greater than 38 C (100.4 F)  lidocaine 5% Ointment 1 Application(s) Topical daily PRN to viral rash on Back    T(C): 36.7 (09-11-17 @ 12:38), Max: 37 (09-10-17 @ 20:00)  HR: 76 (09-11-17 @ 08:00) (69 - 94)  BP: 108/52 (09-11-17 @ 08:00) (100/55 - 138/72)  RR: 18 (09-11-17 @ 08:00)  SpO2: 100% (09-11-17 @ 08:00)  Wt(kg): --  I&O's Detail    10 Sep 2017 07:01  -  11 Sep 2017 07:00  --------------------------------------------------------  IN:    dextrose 5%.: 1575 mL    Oral Fluid: 450 mL  Total IN: 2025 mL    OUT:  Total OUT: 0 mL    Total NET: 2025 mL      11 Sep 2017 07:01  -  11 Sep 2017 13:05  --------------------------------------------------------  IN:    Oral Fluid: 320 mL  Total IN: 320 mL    OUT:  Total OUT: 0 mL    Total NET: 320 mL            PHYSICAL EXAM:  General: NAD  Respiratory: b/l air entry  Cardiovascular: S1 S2  Gastrointestinal: soft  Extremities:  no edema                 LABORATORY:                        12.3   8.3   )-----------( 312      ( 11 Sep 2017 07:03 )             36.0     09-11    138  |  104  |  14  ----------------------------<  84  3.8   |  25  |  1.87<H>    Ca    7.8<L>      11 Sep 2017 07:03    TPro  5.9<L>  /  Alb  2.0<L>  /  TBili  0.4  /  DBili  x   /  AST  22  /  ALT  12  /  AlkPhos  71  09-11    Sodium, Serum: 138 mmol/L (09-11 @ 07:03)    Potassium, Serum: 3.8 mmol/L (09-11 @ 07:03)    Hemoglobin: 12.3 g/dL (09-11 @ 07:03)  Hemoglobin: 12.3 g/dL (09-09 @ 06:09)    Creatinine, Serum 1.87 (09-11 @ 07:03)  Creatinine, Serum 1.89 (09-09 @ 06:09)        LIVER FUNCTIONS - ( 11 Sep 2017 07:03 )  Alb: 2.0 g/dL / Pro: 5.9 g/dL / ALK PHOS: 71 U/L / ALT: 12 U/L DA / AST: 22 U/L / GGT: x

## 2017-09-11 NOTE — PROGRESS NOTE ADULT - PROBLEM SELECTOR PROBLEM 4
Aspiration pneumonia
Frail elderly
Acute renal failure, unspecified acute renal failure type
Dementia with behavioral disturbance
Other elevated white blood cell (WBC) count
Dementia

## 2017-09-11 NOTE — PROGRESS NOTE ADULT - SUBJECTIVE AND OBJECTIVE BOX
Neurology follow up note    AZUL VILLA88yMale      Interval History:    Patient having breakfast     MEDICATIONS    heparin  Injectable 5000 Unit(s) SubCutaneous every 12 hours  risperiDONE   Tablet 0.25 milliGRAM(s) Oral at bedtime  acetaminophen    Suspension. 650 milliGRAM(s) Oral every 8 hours PRN  acetaminophen  Suppository 650 milliGRAM(s) Rectal every 6 hours PRN  ALBUTerol    0.083% 2.5 milliGRAM(s) Nebulizer every 12 hours  lidocaine 5% Ointment 1 Application(s) Topical daily PRN  hydrocortisone 1% Cream 1 Application(s) Topical two times a day  lidocaine   Patch 1 Patch Transdermal daily      Allergies    No Known Allergies    Intolerances            Vital Signs Last 24 Hrs  T(C): 36.9 (11 Sep 2017 07:39), Max: 37 (10 Sep 2017 20:00)  T(F): 98.4 (11 Sep 2017 07:39), Max: 98.6 (10 Sep 2017 20:00)  HR: 76 (11 Sep 2017 08:00) (76 - 94)  BP: 108/52 (11 Sep 2017 08:00) (100/55 - 138/72)  BP(mean): 69 (11 Sep 2017 08:00) (67 - 94)  RR: 18 (11 Sep 2017 08:00) (17 - 30)  SpO2: 100% (11 Sep 2017 08:00) (97% - 100%)     REVIEW OF SYSTEMS: Limit or unable to obtain secondary to patient's poor mental status.        On Neurological Examination:    Mental Status - Patient is alert, awake,       Follow simple commands      Speech -  was having breakfast     Cranial Nerves - Pupils 3 mm equal and reactive to light,   extraocular eye movements intact.   smile symmetric  intact bilateral NLF    Motor Exam -     positive movement of all 4 extremities    Muscle tone - is normal all over.  No asymmetry is seen.      Sensory    Bilateral intact to light touch    Gait -  normal  ataxia     GENERAL Exam: Nontoxic , No Acute Distress   	  HEENT:  normocephalic, atraumatic  		  LUNGS:   Decreased bilaterally  	  HEART: Normal S1S2   No murmur RRR        	  GI/ ABDOMEN:  Soft  Non tender    EXTREMITIES:   No Edema  No Clubbing  No Cyanosis No Edema    MUSCULOSKELETAL: Normal Range of Motion  	   SKIN: + rash better                 LABS:  CBC Full  -  ( 11 Sep 2017 07:03 )  WBC Count : 8.3 K/uL  Hemoglobin : 12.3 g/dL  Hematocrit : 36.0 %  Platelet Count - Automated : 312 K/uL  Mean Cell Volume : 91.2 fl  Mean Cell Hemoglobin : 31.2 pg  Mean Cell Hemoglobin Concentration : 34.3 gm/dL  Auto Neutrophil # : x  Auto Lymphocyte # : x  Auto Monocyte # : x  Auto Eosinophil # : x  Auto Basophil # : x  Auto Neutrophil % : x  Auto Lymphocyte % : x  Auto Monocyte % : x  Auto Eosinophil % : x  Auto Basophil % : x      09-11    138  |  104  |  14  ----------------------------<  84  3.8   |  25  |  1.87<H>    Ca    7.8<L>      11 Sep 2017 07:03    TPro  5.9<L>  /  Alb  2.0<L>  /  TBili  0.4  /  DBili  x   /  AST  22  /  ALT  12  /  AlkPhos  71  09-11    Hemoglobin A1C:     LIVER FUNCTIONS - ( 11 Sep 2017 07:03 )  Alb: 2.0 g/dL / Pro: 5.9 g/dL / ALK PHOS: 71 U/L / ALT: 12 U/L DA / AST: 22 U/L / GGT: x           Vitamin B12         RADIOLOGY    ANALYSIS AND PLAN:  An 88-year-old with episode of changes in mental status and history of dementia.  1.	For changes in mental status, most likely toxic metabolic encephalopathy, which is multifactorial, possibly secondary to underlying viral-type syndrome along with hypernatremia and acute kidney injury.  2.	Recommend IV fluid hydration and monitor renal function.  3.	Recommend antibiotics as needed.  4.	Monitor patient's electrolytes.  5.	aspiration  precautions   6.	monitor oral intake -- having breakfast  7.	PT eval if possible  8.	renal function improving   9.	monitor oral intake   10.	For history of dementia, for now we will recommend supportive therapy.  11.	neck pain will try lidoderm patch better no pain reported   12.	rehab  I spoke with daughter Valery at [phone] 9/11/17. She is aware that in the hospital setting, patient may become more disoriented.  He does have a history of sundowning and if necessary she will come stay with him.  We will continue to follow.  Would continue to follow.  20 minutes spent on total encounter; more than 50% of the visit was spent counseling and/or coordinating care by the attending physician.

## 2017-10-19 PROCEDURE — 97162 PT EVAL MOD COMPLEX 30 MIN: CPT

## 2017-10-19 PROCEDURE — 87400 INFLUENZA A/B EACH AG IA: CPT

## 2017-10-19 PROCEDURE — 81001 URINALYSIS AUTO W/SCOPE: CPT

## 2017-10-19 PROCEDURE — 87186 SC STD MICRODIL/AGAR DIL: CPT

## 2017-10-19 PROCEDURE — 97530 THERAPEUTIC ACTIVITIES: CPT

## 2017-10-19 PROCEDURE — 83605 ASSAY OF LACTIC ACID: CPT

## 2017-10-19 PROCEDURE — 87633 RESP VIRUS 12-25 TARGETS: CPT

## 2017-10-19 PROCEDURE — 84145 PROCALCITONIN (PCT): CPT

## 2017-10-19 PROCEDURE — 87798 DETECT AGENT NOS DNA AMP: CPT

## 2017-10-19 PROCEDURE — 84630 ASSAY OF ZINC: CPT

## 2017-10-19 PROCEDURE — 80048 BASIC METABOLIC PNL TOTAL CA: CPT

## 2017-10-19 PROCEDURE — 87070 CULTURE OTHR SPECIMN AEROBIC: CPT

## 2017-10-19 PROCEDURE — 85610 PROTHROMBIN TIME: CPT

## 2017-10-19 PROCEDURE — 36415 COLL VENOUS BLD VENIPUNCTURE: CPT

## 2017-10-19 PROCEDURE — 80053 COMPREHEN METABOLIC PANEL: CPT

## 2017-10-19 PROCEDURE — 71045 X-RAY EXAM CHEST 1 VIEW: CPT

## 2017-10-19 PROCEDURE — 97110 THERAPEUTIC EXERCISES: CPT

## 2017-10-19 PROCEDURE — 70450 CT HEAD/BRAIN W/O DYE: CPT

## 2017-10-19 PROCEDURE — 84480 ASSAY TRIIODOTHYRONINE (T3): CPT

## 2017-10-19 PROCEDURE — 94640 AIRWAY INHALATION TREATMENT: CPT

## 2017-10-19 PROCEDURE — 87486 CHLMYD PNEUM DNA AMP PROBE: CPT

## 2017-10-19 PROCEDURE — 96372 THER/PROPH/DIAG INJ SC/IM: CPT | Mod: XU

## 2017-10-19 PROCEDURE — 93005 ELECTROCARDIOGRAM TRACING: CPT

## 2017-10-19 PROCEDURE — 84436 ASSAY OF TOTAL THYROXINE: CPT

## 2017-10-19 PROCEDURE — 99285 EMERGENCY DEPT VISIT HI MDM: CPT | Mod: 25

## 2017-10-19 PROCEDURE — 84550 ASSAY OF BLOOD/URIC ACID: CPT

## 2017-10-19 PROCEDURE — 84443 ASSAY THYROID STIM HORMONE: CPT

## 2017-10-19 PROCEDURE — 87040 BLOOD CULTURE FOR BACTERIA: CPT

## 2017-10-19 PROCEDURE — 82607 VITAMIN B-12: CPT

## 2017-10-19 PROCEDURE — 84484 ASSAY OF TROPONIN QUANT: CPT

## 2017-10-19 PROCEDURE — 84134 ASSAY OF PREALBUMIN: CPT

## 2017-10-19 PROCEDURE — 96365 THER/PROPH/DIAG IV INF INIT: CPT

## 2017-10-19 PROCEDURE — 85027 COMPLETE CBC AUTOMATED: CPT

## 2017-10-19 PROCEDURE — 85730 THROMBOPLASTIN TIME PARTIAL: CPT

## 2017-10-19 PROCEDURE — 87581 M.PNEUMON DNA AMP PROBE: CPT

## 2017-10-19 PROCEDURE — 83880 ASSAY OF NATRIURETIC PEPTIDE: CPT

## 2017-10-19 PROCEDURE — 87086 URINE CULTURE/COLONY COUNT: CPT

## 2017-10-19 PROCEDURE — 96367 TX/PROPH/DG ADDL SEQ IV INF: CPT

## 2017-10-19 PROCEDURE — 97116 GAIT TRAINING THERAPY: CPT

## 2018-09-17 ENCOUNTER — INPATIENT (INPATIENT)
Facility: HOSPITAL | Age: 83
LOS: 8 days | Discharge: HOSPICE | DRG: 640 | End: 2018-09-26
Attending: INTERNAL MEDICINE | Admitting: INTERNAL MEDICINE
Payer: MEDICARE

## 2018-09-17 VITALS
SYSTOLIC BLOOD PRESSURE: 161 MMHG | WEIGHT: 130.07 LBS | DIASTOLIC BLOOD PRESSURE: 68 MMHG | OXYGEN SATURATION: 95 % | TEMPERATURE: 98 F | HEIGHT: 64 IN | HEART RATE: 85 BPM | RESPIRATION RATE: 14 BRPM

## 2018-09-17 DIAGNOSIS — R62.7 ADULT FAILURE TO THRIVE: ICD-10-CM

## 2018-09-17 DIAGNOSIS — F03.91 UNSPECIFIED DEMENTIA WITH BEHAVIORAL DISTURBANCE: ICD-10-CM

## 2018-09-17 DIAGNOSIS — R41.82 ALTERED MENTAL STATUS, UNSPECIFIED: ICD-10-CM

## 2018-09-17 DIAGNOSIS — E86.0 DEHYDRATION: ICD-10-CM

## 2018-09-17 DIAGNOSIS — D72.829 ELEVATED WHITE BLOOD CELL COUNT, UNSPECIFIED: ICD-10-CM

## 2018-09-17 LAB
ALBUMIN SERPL ELPH-MCNC: 3.7 G/DL — SIGNIFICANT CHANGE UP (ref 3.3–5)
ALP SERPL-CCNC: 102 U/L — SIGNIFICANT CHANGE UP (ref 30–120)
ALT FLD-CCNC: 16 U/L DA — SIGNIFICANT CHANGE UP (ref 10–60)
ANION GAP SERPL CALC-SCNC: 10 MMOL/L — SIGNIFICANT CHANGE UP (ref 5–17)
APPEARANCE UR: CLEAR — SIGNIFICANT CHANGE UP
APTT BLD: 31.8 SEC — SIGNIFICANT CHANGE UP (ref 27.5–37.4)
AST SERPL-CCNC: 16 U/L — SIGNIFICANT CHANGE UP (ref 10–40)
BASOPHILS # BLD AUTO: 0.06 K/UL — SIGNIFICANT CHANGE UP (ref 0–0.2)
BASOPHILS NFR BLD AUTO: 0.3 % — SIGNIFICANT CHANGE UP (ref 0–2)
BILIRUB SERPL-MCNC: 0.6 MG/DL — SIGNIFICANT CHANGE UP (ref 0.2–1.2)
BILIRUB UR-MCNC: NEGATIVE — SIGNIFICANT CHANGE UP
BUN SERPL-MCNC: 19 MG/DL — SIGNIFICANT CHANGE UP (ref 7–23)
CALCIUM SERPL-MCNC: 9.3 MG/DL — SIGNIFICANT CHANGE UP (ref 8.4–10.5)
CHLORIDE SERPL-SCNC: 99 MMOL/L — SIGNIFICANT CHANGE UP (ref 96–108)
CO2 SERPL-SCNC: 27 MMOL/L — SIGNIFICANT CHANGE UP (ref 22–31)
COLOR SPEC: YELLOW — SIGNIFICANT CHANGE UP
CREAT SERPL-MCNC: 1.57 MG/DL — HIGH (ref 0.5–1.3)
DIFF PNL FLD: NEGATIVE — SIGNIFICANT CHANGE UP
EOSINOPHIL # BLD AUTO: 0.09 K/UL — SIGNIFICANT CHANGE UP (ref 0–0.5)
EOSINOPHIL NFR BLD AUTO: 0.5 % — SIGNIFICANT CHANGE UP (ref 0–6)
GLUCOSE SERPL-MCNC: 116 MG/DL — HIGH (ref 70–99)
GLUCOSE UR QL: NEGATIVE MG/DL — SIGNIFICANT CHANGE UP
HCT VFR BLD CALC: 46.4 % — SIGNIFICANT CHANGE UP (ref 39–50)
HGB BLD-MCNC: 15 G/DL — SIGNIFICANT CHANGE UP (ref 13–17)
IMM GRANULOCYTES NFR BLD AUTO: 0.6 % — SIGNIFICANT CHANGE UP (ref 0–1.5)
INR BLD: 1.01 RATIO — SIGNIFICANT CHANGE UP (ref 0.88–1.16)
KETONES UR-MCNC: ABNORMAL
LACTATE SERPL-SCNC: 1.7 MMOL/L — SIGNIFICANT CHANGE UP (ref 0.7–2)
LACTATE SERPL-SCNC: 3.4 MMOL/L — HIGH (ref 0.7–2)
LEUKOCYTE ESTERASE UR-ACNC: NEGATIVE — SIGNIFICANT CHANGE UP
LIDOCAIN IGE QN: 85 U/L — SIGNIFICANT CHANGE UP (ref 73–393)
LYMPHOCYTES # BLD AUTO: 1.26 K/UL — SIGNIFICANT CHANGE UP (ref 1–3.3)
LYMPHOCYTES # BLD AUTO: 7.3 % — LOW (ref 13–44)
MCHC RBC-ENTMCNC: 30.4 PG — SIGNIFICANT CHANGE UP (ref 27–34)
MCHC RBC-ENTMCNC: 32.3 GM/DL — SIGNIFICANT CHANGE UP (ref 32–36)
MCV RBC AUTO: 93.9 FL — SIGNIFICANT CHANGE UP (ref 80–100)
MONOCYTES # BLD AUTO: 0.82 K/UL — SIGNIFICANT CHANGE UP (ref 0–0.9)
MONOCYTES NFR BLD AUTO: 4.8 % — SIGNIFICANT CHANGE UP (ref 2–14)
NEUTROPHILS # BLD AUTO: 14.89 K/UL — HIGH (ref 1.8–7.4)
NEUTROPHILS NFR BLD AUTO: 86.5 % — HIGH (ref 43–77)
NITRITE UR-MCNC: NEGATIVE — SIGNIFICANT CHANGE UP
PH UR: 5 — SIGNIFICANT CHANGE UP (ref 5–8)
PLATELET # BLD AUTO: 383 K/UL — SIGNIFICANT CHANGE UP (ref 150–400)
POTASSIUM SERPL-MCNC: 5.5 MMOL/L — HIGH (ref 3.5–5.3)
POTASSIUM SERPL-SCNC: 5.5 MMOL/L — HIGH (ref 3.5–5.3)
PROT SERPL-MCNC: 7.8 G/DL — SIGNIFICANT CHANGE UP (ref 6–8.3)
PROT UR-MCNC: NEGATIVE MG/DL — SIGNIFICANT CHANGE UP
PROTHROM AB SERPL-ACNC: 11 SEC — SIGNIFICANT CHANGE UP (ref 9.8–12.7)
RBC # BLD: 4.94 M/UL — SIGNIFICANT CHANGE UP (ref 4.2–5.8)
RBC # FLD: 14.6 % — HIGH (ref 10.3–14.5)
SODIUM SERPL-SCNC: 136 MMOL/L — SIGNIFICANT CHANGE UP (ref 135–145)
SP GR SPEC: 1.02 — SIGNIFICANT CHANGE UP (ref 1.01–1.02)
UROBILINOGEN FLD QL: NEGATIVE MG/DL — SIGNIFICANT CHANGE UP
WBC # BLD: 17.22 K/UL — HIGH (ref 3.8–10.5)
WBC # FLD AUTO: 17.22 K/UL — HIGH (ref 3.8–10.5)

## 2018-09-17 PROCEDURE — 99285 EMERGENCY DEPT VISIT HI MDM: CPT

## 2018-09-17 PROCEDURE — 93010 ELECTROCARDIOGRAM REPORT: CPT

## 2018-09-17 PROCEDURE — 71045 X-RAY EXAM CHEST 1 VIEW: CPT | Mod: 26

## 2018-09-17 PROCEDURE — 70450 CT HEAD/BRAIN W/O DYE: CPT | Mod: 26

## 2018-09-17 RX ORDER — RISPERIDONE 4 MG/1
0.25 TABLET ORAL AT BEDTIME
Qty: 0 | Refills: 0 | Status: DISCONTINUED | OUTPATIENT
Start: 2018-09-17 | End: 2018-09-19

## 2018-09-17 RX ORDER — SODIUM CHLORIDE 9 MG/ML
1000 INJECTION, SOLUTION INTRAVENOUS
Qty: 0 | Refills: 0 | Status: DISCONTINUED | OUTPATIENT
Start: 2018-09-17 | End: 2018-09-26

## 2018-09-17 RX ORDER — SODIUM CHLORIDE 9 MG/ML
1000 INJECTION INTRAMUSCULAR; INTRAVENOUS; SUBCUTANEOUS ONCE
Qty: 0 | Refills: 0 | Status: COMPLETED | OUTPATIENT
Start: 2018-09-17 | End: 2018-09-17

## 2018-09-17 RX ORDER — CEFTRIAXONE 500 MG/1
1 INJECTION, POWDER, FOR SOLUTION INTRAMUSCULAR; INTRAVENOUS EVERY 24 HOURS
Qty: 0 | Refills: 0 | Status: DISCONTINUED | OUTPATIENT
Start: 2018-09-18 | End: 2018-09-19

## 2018-09-17 RX ORDER — ACETAMINOPHEN 500 MG
650 TABLET ORAL EVERY 8 HOURS
Qty: 0 | Refills: 0 | Status: DISCONTINUED | OUTPATIENT
Start: 2018-09-17 | End: 2018-09-26

## 2018-09-17 RX ORDER — CEFTRIAXONE 500 MG/1
1 INJECTION, POWDER, FOR SOLUTION INTRAMUSCULAR; INTRAVENOUS ONCE
Qty: 0 | Refills: 0 | Status: COMPLETED | OUTPATIENT
Start: 2018-09-17 | End: 2018-09-17

## 2018-09-17 RX ORDER — ACETAMINOPHEN 500 MG
650 TABLET ORAL EVERY 8 HOURS
Qty: 0 | Refills: 0 | Status: DISCONTINUED | OUTPATIENT
Start: 2018-09-17 | End: 2018-09-17

## 2018-09-17 RX ORDER — ENOXAPARIN SODIUM 100 MG/ML
30 INJECTION SUBCUTANEOUS DAILY
Qty: 0 | Refills: 0 | Status: DISCONTINUED | OUTPATIENT
Start: 2018-09-17 | End: 2018-09-26

## 2018-09-17 RX ORDER — CEFTRIAXONE 500 MG/1
INJECTION, POWDER, FOR SOLUTION INTRAMUSCULAR; INTRAVENOUS
Qty: 0 | Refills: 0 | Status: DISCONTINUED | OUTPATIENT
Start: 2018-09-17 | End: 2018-09-19

## 2018-09-17 RX ORDER — HALOPERIDOL DECANOATE 100 MG/ML
2 INJECTION INTRAMUSCULAR ONCE
Qty: 0 | Refills: 0 | Status: COMPLETED | OUTPATIENT
Start: 2018-09-17 | End: 2018-09-17

## 2018-09-17 RX ORDER — SODIUM CHLORIDE 9 MG/ML
3 INJECTION INTRAMUSCULAR; INTRAVENOUS; SUBCUTANEOUS ONCE
Qty: 0 | Refills: 0 | Status: COMPLETED | OUTPATIENT
Start: 2018-09-17 | End: 2018-09-17

## 2018-09-17 RX ADMIN — SODIUM CHLORIDE 1000 MILLILITER(S): 9 INJECTION INTRAMUSCULAR; INTRAVENOUS; SUBCUTANEOUS at 14:30

## 2018-09-17 RX ADMIN — RISPERIDONE 0.25 MILLIGRAM(S): 4 TABLET ORAL at 22:01

## 2018-09-17 RX ADMIN — Medication 0.5 MILLIGRAM(S): at 13:00

## 2018-09-17 RX ADMIN — CEFTRIAXONE 100 GRAM(S): 500 INJECTION, POWDER, FOR SOLUTION INTRAMUSCULAR; INTRAVENOUS at 19:37

## 2018-09-17 RX ADMIN — SODIUM CHLORIDE 50 MILLILITER(S): 9 INJECTION, SOLUTION INTRAVENOUS at 19:37

## 2018-09-17 RX ADMIN — SODIUM CHLORIDE 1000 MILLILITER(S): 9 INJECTION INTRAMUSCULAR; INTRAVENOUS; SUBCUTANEOUS at 13:25

## 2018-09-17 RX ADMIN — Medication 650 MILLIGRAM(S): at 22:10

## 2018-09-17 RX ADMIN — SODIUM CHLORIDE 3 MILLILITER(S): 9 INJECTION INTRAMUSCULAR; INTRAVENOUS; SUBCUTANEOUS at 13:25

## 2018-09-17 RX ADMIN — HALOPERIDOL DECANOATE 2 MILLIGRAM(S): 100 INJECTION INTRAMUSCULAR at 13:00

## 2018-09-17 RX ADMIN — Medication 650 MILLIGRAM(S): at 21:47

## 2018-09-17 RX ADMIN — ENOXAPARIN SODIUM 30 MILLIGRAM(S): 100 INJECTION SUBCUTANEOUS at 18:22

## 2018-09-17 NOTE — ED ADULT NURSE NOTE - OBJECTIVE STATEMENT
Patient brought to ED via [facility] from his group home accompanied by his HHA. Per HHA patient is more confused today and combative with any care and has refused to eat. Patient with history of dementia and is unable to give any history. Patient combative with vital signs and assessment.

## 2018-09-17 NOTE — H&P ADULT - HISTORY OF PRESENT ILLNESS
89 yr old w/m Lives at home with 24h Home health Aide   Pt's Daughter / Healthcare proxy called to obtain history,  Pt had change in mental status, poor intake and L- thumb nail is swollen  Pt was taking Risperidone prn for agitation 89 yr old w/m Lives at home with 24h Home health Aide   Pt's Daughter / Healthcare proxy called to obtain history,  Pt had change in mental status, poor intake and L- thumb nail is swollen  Pt was taking Risperidone prn for agitation   H/O Behavior Disturbance, Dementia,Hypotyhroidism

## 2018-09-17 NOTE — ED ADULT NURSE NOTE - NSIMPLEMENTINTERV_GEN_ALL_ED
Implemented All Fall with Harm Risk Interventions:  [place] to call system. Call bell, personal items and telephone within reach. Instruct patient to call for assistance. Room bathroom lighting operational. Non-slip footwear when patient is off stretcher. Physically safe environment: no spills, clutter or unnecessary equipment. Stretcher in lowest position, wheels locked, appropriate side rails in place. Provide visual cue, wrist band, yellow gown, etc. Monitor gait and stability. Monitor for mental status changes and reorient to person, place, and time. Review medications for side effects contributing to fall risk. Reinforce activity limits and safety measures with patient and family. Provide visual clues: red socks.

## 2018-09-17 NOTE — ED PROVIDER NOTE - OBJECTIVE STATEMENT
pt is a89yo male with pmhx of hypothyroid and dementia bib ems from group home with aide at bedside presenting with agitation. group home aide reports pt has been more agitated than usual with [id number] eating. reports she last saw pt saturday.

## 2018-09-17 NOTE — ED ADULT NURSE REASSESSMENT NOTE - NS ED NURSE REASSESS COMMENT FT1
Unable to obtain blood work. patient agitated and combative with IV/blood work attempts. Dr. Iniguez aware and Lilli POWERS aware. Patient medicated with Haldol and Ativan as ordered. Will reattempt after medication effect.

## 2018-09-17 NOTE — ED PROVIDER NOTE - PROGRESS NOTE DETAILS
Attending Contribution to Care: 89 y/o M pt BIBA from group home presents to the ED c/o agitated behavior as per staff and EMS. Pt has hx of dementia and is confused at baseline. Unable to obtain complete hx from pt due to dementia.  PE- nonfocal   Plan- medical work up and may need psych evaluation. labs and imaging reviewed. lactate 3.4. will order ivf and repeat. will admit to dr mcintosh

## 2018-09-17 NOTE — H&P ADULT - ASSESSMENT
89 yr old w/m 89 yr old w/m brought to ER with AMS, poor appetite, worsening agitation  In ER found to have Leukocytosis, high lactate  Admitted for Dehydration, AMS, Behaviour Disorder,, Failure to Thrive  H/O Behavior Disturbance, Dementia, Hypothyroidism

## 2018-09-18 LAB
ALBUMIN SERPL ELPH-MCNC: 2.9 G/DL — LOW (ref 3.3–5)
ALP SERPL-CCNC: 78 U/L — SIGNIFICANT CHANGE UP (ref 30–120)
ALT FLD-CCNC: 14 U/L DA — SIGNIFICANT CHANGE UP (ref 10–60)
ANION GAP SERPL CALC-SCNC: 13 MMOL/L — SIGNIFICANT CHANGE UP (ref 5–17)
AST SERPL-CCNC: 16 U/L — SIGNIFICANT CHANGE UP (ref 10–40)
BILIRUB SERPL-MCNC: 0.6 MG/DL — SIGNIFICANT CHANGE UP (ref 0.2–1.2)
BUN SERPL-MCNC: 17 MG/DL — SIGNIFICANT CHANGE UP (ref 7–23)
CALCIUM SERPL-MCNC: 8.4 MG/DL — SIGNIFICANT CHANGE UP (ref 8.4–10.5)
CHLORIDE SERPL-SCNC: 98 MMOL/L — SIGNIFICANT CHANGE UP (ref 96–108)
CO2 SERPL-SCNC: 19 MMOL/L — LOW (ref 22–31)
CREAT SERPL-MCNC: 1.47 MG/DL — HIGH (ref 0.5–1.3)
CULTURE RESULTS: NO GROWTH — SIGNIFICANT CHANGE UP
GLUCOSE SERPL-MCNC: 102 MG/DL — HIGH (ref 70–99)
HCT VFR BLD CALC: 38.5 % — LOW (ref 39–50)
HGB BLD-MCNC: 12.3 G/DL — LOW (ref 13–17)
MCHC RBC-ENTMCNC: 30.2 PG — SIGNIFICANT CHANGE UP (ref 27–34)
MCHC RBC-ENTMCNC: 31.9 GM/DL — LOW (ref 32–36)
MCV RBC AUTO: 94.6 FL — SIGNIFICANT CHANGE UP (ref 80–100)
NRBC # BLD: 0 /100 WBCS — SIGNIFICANT CHANGE UP (ref 0–0)
PLATELET # BLD AUTO: 329 K/UL — SIGNIFICANT CHANGE UP (ref 150–400)
POTASSIUM SERPL-MCNC: 5.4 MMOL/L — HIGH (ref 3.5–5.3)
POTASSIUM SERPL-SCNC: 5.4 MMOL/L — HIGH (ref 3.5–5.3)
PROT SERPL-MCNC: 6.3 G/DL — SIGNIFICANT CHANGE UP (ref 6–8.3)
RBC # BLD: 4.07 M/UL — LOW (ref 4.2–5.8)
RBC # FLD: 14.6 % — HIGH (ref 10.3–14.5)
SODIUM SERPL-SCNC: 130 MMOL/L — LOW (ref 135–145)
SPECIMEN SOURCE: SIGNIFICANT CHANGE UP
TSH SERPL-MCNC: 4.57 UIU/ML — HIGH (ref 0.27–4.2)
WBC # BLD: 10.62 K/UL — HIGH (ref 3.8–10.5)
WBC # FLD AUTO: 10.62 K/UL — HIGH (ref 3.8–10.5)

## 2018-09-18 RX ADMIN — CEFTRIAXONE 100 GRAM(S): 500 INJECTION, POWDER, FOR SOLUTION INTRAMUSCULAR; INTRAVENOUS at 19:22

## 2018-09-18 RX ADMIN — RISPERIDONE 0.25 MILLIGRAM(S): 4 TABLET ORAL at 21:34

## 2018-09-18 RX ADMIN — SODIUM CHLORIDE 50 MILLILITER(S): 9 INJECTION, SOLUTION INTRAVENOUS at 21:38

## 2018-09-18 RX ADMIN — ENOXAPARIN SODIUM 30 MILLIGRAM(S): 100 INJECTION SUBCUTANEOUS at 11:55

## 2018-09-18 NOTE — DISCHARGE NOTE ADULT - MEDICATION SUMMARY - MEDICATIONS TO STOP TAKING
I will STOP taking the medications listed below when I get home from the hospital:    risperiDONE 0.25 mg oral tablet  -- 1  by mouth once a day (at bedtime)    lidocaine 5% topical film  -- Apply on skin to affected area once a day, As Needed  . neck pain.    hydrocortisone 1% topical cream  -- 1 application on skin 2 times a day for 5 days.    acetaminophen 160 mg/5 mL oral suspension  -- 20.31 milliliter(s) by mouth every 8 hours, As needed, Moderate Pain (4 - 6)

## 2018-09-18 NOTE — DIETITIAN INITIAL EVALUATION ADULT. - NS AS NUTRI INTERV MEDICAL AND FOOD SUPPLEMENTS
Commercial beverage/As medically feasible, if diet able to advance- recommend ensure enlive tid to provide up to addtl 1050kcal, 60g protein per day

## 2018-09-18 NOTE — DISCHARGE NOTE ADULT - PATIENT PORTAL LINK FT
You can access the [facility] Patient Portal, offered by [facility], by registering with the following website: [email or web address]

## 2018-09-18 NOTE — DISCHARGE NOTE ADULT - CARE PLAN
Principal Discharge DX:	Failure to thrive in adult  Goal:	Pt will be transferred to [facility]  Assessment and plan of treatment:	End of life care  Secondary Diagnosis:	Dementia with behavioral disturbance, unspecified dementia type  Assessment and plan of treatment:	Supportive care

## 2018-09-18 NOTE — DIETITIAN INITIAL EVALUATION ADULT. - OTHER INFO
89 year old male adm from home c AMS, agitation and decreased po intake/refusal of meals/dehydration.  Pt c hx of dementia and is noted to have 24hr live-in HHA, who was not present during multiple visit attempts.  Nutrition consult ordered for assessment.  Pt is non communicative and no family/aide were at bedside to provide nutrition related hx PTA.  Pt currently NPO c D5@50ml/hr infusing.  SLP evaluation pending.  Unable to assess if there were any recent wt changes, however, it is likely as it is decreased po intake PTA is documented.  Full NFPE could not be completed but pt appears to have age related fat/muscle mass loss.  Pt p/w stage I to BL heels as well as stage I to sacrum.  Pt c increased needs- recommend adding Ensure Ensure supplement at meals for additional kcal/protein, pending SLP evaluation results; will also provide nutrient dense snacks such as magic cup fortified ice cream once medically appropriate.  RD to follow up for SLP eval results, attempt to visit when family/aide visit to obtain nutrition hx.

## 2018-09-18 NOTE — DISCHARGE NOTE ADULT - HOSPITAL COURSE
89 yr old w/m was brought to ER with AMS, poor appetite, worsening agitation  In ER found to have Leukocytosis, high lactate  Admitted for Dehydration, AMS, Behaviour Disorder,, Failure to Thrive  H/O Behavior Disturbance, Dementia, Hypothyroidism  Leukocytosis improved, L-thumb paronychia improved  Blood and Urine cultures -- No growth to date  Spoke with daughter/ healthcare proxy at length, she is going to send pt's aide,  Willing to take home when stable  Will d/c NPO order, try clear fluids and advance to pureed diet  Pt seen by Dr Colvin, Psych Consult, will put pt on Risperidone 1 mg hs, and Zyprexa prn    89 yr old w/m admitted for Dehydration, AMS, Behaviour Disorder, Failure to Thrive  Leukocytosis improved, L-thumb paronychia , swelling is less  Blood and Urine cultures -- No growth to date, seen by Dr Erwin, IV Rocephin discontinued  Seen by speech and Swallow, Recommended NPO   Spoke with daughter/ healthcare proxy at length, she wants to bring his favorite food and feed him,  made her aware of possible aspiration, but ,she wants to feed him, did not want to talk about feeding tube  Willing to take home when stable   Pt seen by Dr Colvin, Psych Consult, will put pt on Risperidone 1 mg hs, and Zyprexa prn    89 yr old w/m admitted for Dehydration, AMS, Behaviour Disorder, Failure to Thrive  Leukocytosis improved, L-thumb paronychia , swelling is less  Blood and Urine cultures -- No growth to date, seen by Dr Erwin, IV Rocephin discontinued on 9/20/18  Seen by speech and Swallow, Recommended NPO   Spoke with daughter/ healthcare proxy at length, she wanted to bring his favorite food and feed him,  made her aware of possible aspiration, but ,she wanted to feed him, did not want to talk about feeding tube  Pt seen by Dr Colvin, Psych Consult, will put pt on Risperidone 1 mg hs, and Zyprexa prn  Dr [facility]   Spoke with daughter/ healthcare proxy, Pt accepted for [facility] admission   Will transfer to [facility] when arrangements made

## 2018-09-18 NOTE — CONSULT NOTE ADULT - SUBJECTIVE AND OBJECTIVE BOX
HPI:  89 yr old PMH advanced Dementia hypothyroid from group home brought to hospital with   CC agitation and decreased po intake. Pt unable to provide hx. No fever n/v/d. In ED noted   to have leukocytosis with CKD. No fever but overnight had fever Tmax 100.8. Pt appears to   be in no distress and calm. Nonverbal. No rash. CXR and UA negative.    Infectious Disease consult was called to evaluate pt for poss infectious process.    Past Medical & Surgical Hx:  PAST MEDICAL & SURGICAL HISTORY:  Hypothyroid  Dementia with behavioral disturbance  No significant past surgical history      Social History--  EtOH: denies   Tobacco: denies   Drug Use: denies       FAMILY HISTORY:  No pertinent family history in first degree relatives      Allergies  No Known Allergies    Home Medications:  Home Medications:   * Patient Currently Takes Medications as of 17-Sep-2018 12:36 documented in Structured Notes  · 	risperiDONE 0.25 mg oral tablet: Last Dose Taken:  , 1  orally once a day (at bedtime)   · 	lidocaine 5% topical film: Last Dose Taken:  , Apply topically to affected area once a day, As Needed  . neck pain.   · 	acetaminophen 160 mg/5 mL oral suspension: Last Dose Taken:  , 20.31 milliliter(s) orally every 8 hours, As needed, Moderate Pain (4 - 6)  · 	hydrocortisone 1% topical cream: Last Dose Taken:  , 1 application topically 2 times a day for 5 days.      Current Inpatient Medications :    ANTIBIOTICS:   cefTRIAXone   IVPB      cefTRIAXone   IVPB 1 Gram(s) IV Intermittent every 24 hours      OTHER RELEVANT MEDICATIONS :  acetaminophen  Suppository .. 650 milliGRAM(s) Rectal every 8 hours PRN  dextrose 5%. 1000 milliLiter(s) IV Continuous <Continuous>  enoxaparin Injectable 30 milliGRAM(s) SubCutaneous daily  risperiDONE   Tablet 0.25 milliGRAM(s) Oral at bedtime      ROS:  Unable to obtain due to : advanced dementia nonverbal      I&O's Detail    17 Sep 2018 07:01  -  18 Sep 2018 07:00  --------------------------------------------------------  IN:    dextrose 5%.: 350 mL    Sodium Chloride 0.9% IV Bolus: 2000 mL  Total IN: 2350 mL    OUT:    Intermittent Catheterization - Urethral: 30 mL  Total OUT: 30 mL    Total NET: 2320 mL    Physical Exam:  Vital Signs Last 24 Hrs  T(C): 37.2 (18 Sep 2018 10:54), Max: 38.2 (17 Sep 2018 20:26)  T(F): 98.9 (18 Sep 2018 10:54), Max: 100.8 (17 Sep 2018 20:26)  HR: 80 (18 Sep 2018 10:54) (80 - 123)  BP: 144/57 (18 Sep 2018 10:54) (131/67 - 170/89)  BP(mean): --  RR: 19 (18 Sep 2018 10:54) (14 - 19)  SpO2: 96% (18 Sep 2018 10:54) (95% - 100%)  Height (cm): 162.56 (09-17 @ 12:32)  Weight (kg): 59 (09-17 @ 12:32)  BMI (kg/m2): 22.3 (09-17 @ 12:32)  BSA (m2): 1.63 (09-17 @ 12:32)    General: no acute distress thin elderly male nonverbal  Eyes: sclera anicteric, pupils equal and reactive to light  ENMT: buccal mucosa moist, pharynx not injected  Neck: supple, trachea midline  Lungs: clear, no wheeze/rhonchi  Cardiovascular: regular rate and rhythm, S1 S2  Abdomen: soft, nontender, no organomegaly present, bowel sounds normal  Neurological:  awake dementia  Skin:no increased ecchymosis/petechiae/purpura  Lymph Nodes: no palpable cervical/supraclavicular lymph nodes enlargements  Extremities: no cyanosis/clubbing/edema    Labs:  Complete Blood Count + Automated Diff (09.17.18 @ 13:44)    WBC Count: 17.22 K/uL    RBC Count: 4.94 M/uL    Hemoglobin: 15.0 g/dL    Hematocrit: 46.4 %    Mean Cell Volume: 93.9 fl    Mean Cell Hemoglobin: 30.4 pg    Mean Cell Hemoglobin Conc: 32.3 gm/dL    Red Cell Distrib Width: 14.6 %    Platelet Count - Automated: 383 K/uL    Auto Neutrophil #: 14.89 K/uL    Auto Lymphocyte #: 1.26 K/uL    Auto Monocyte #: 0.82 K/uL    Auto Eosinophil #: 0.09 K/uL    Auto Basophil #: 0.06 K/uL    Auto Neutrophil %: 86.5: Differential percentages must be correlated with absolute numbers for  clinical significance. %    Auto Lymphocyte %: 7.3 %    Auto Monocyte %: 4.8 %    Auto Eosinophil %: 0.5 %    Auto Basophil %: 0.3 %    Auto Immature Granulocyte %: 0.6 %                            12.3   10.62 )-----------( 329      ( 18 Sep 2018 07:49 )             38.5        RECENT CULTURES:  PENDING    RADIOLOGY & ADDITIONAL STUDIES:    EXAM:  XR CHEST PORTABLE ROUTINE 1V                                  PROCEDURE DATE:  09/17/2018          INTERPRETATION:  Clinical information: Altered mental status    Comparison study dated 9/10/2017    Portable study, 1:20 PM      No evidence of infiltrate effusion or congestive failure. Heart size   within normal limits. Hilar regions mediastinal contours and bony thorax   are intact.    IMPRESSION: No active disease.    EXAM:  CT BRAIN                            PROCEDURE DATE:  09/17/2018        INTERPRETATION:  Comparison exam dated 9/1/2017    Clinical information: Altered mental status    Axial images obtained, coronal and sagittal images computer reformatted.    Atrophic changes present. Periventricular white matter hypoattenuation,   microvascular ischemic change. There is no evidence of mass effect,   midline shift, epidural, or subdural collection. There is no sign of   acute or recent hemorrhage. No unusual calcifications are identified.   Calvarium intact.   Mastoids pneumatized.    Fluid level, right sphenoid sinus. Trace mucosal disease left sphenoid   sinus.    IMPRESSION: No acute intracranial pathology.      Assessment :   89 yr old PMH advanced Dementia hypothyroid from group home brought to hospital with   CC agitation and decreased po intake.  Dementia with behavioral disturbance  Dehydration  Leukocytosis and fever likely reactive  CXR neg  UA neg    Plan :   Empiric Rocephin for now  Trend temps and wbc  If pt spikes temp will do CT CAP  High risk for asp pna  Asp precautions  Fu cultures    Continue with present regiment .  Approptiate use of antibiotics and adverse effects reviewed.      I have discussed the above plan of care with patient/family in detail. They expressed understanding of the treatment plan . Risks, benefits and alternatives discussed in detail. I have asked if they have any questions or concerns and appropriately addressed them to the best of my ability .      > 45 minutes spent in direct patient care reviewing  the notes, lab data/ imaging , discussion with multidisciplinary team. All questions were addressed and answered to the best of my capacity .    Thank you for allowing me to participate in the care of your patient .      Rik Erwin MD  Infectious Disease  [phone]

## 2018-09-18 NOTE — DIETITIAN INITIAL EVALUATION ADULT. - NUTRITION INTERVENTION
Medical Food Supplements/Collaboration and Referral of Nutrition Care/Parenteral Nutrition/IV Fluids

## 2018-09-18 NOTE — DISCHARGE NOTE ADULT - MEDICATION SUMMARY - MEDICATIONS TO TAKE
I will START or STAY ON the medications listed below when I get home from the hospital:    morphine  -- 2 milligram(s) intravenous every  2hours  for pain, distress, Dyspnea  -- Indication: For Pain, Dyspnea, Disyress    Dextrose 5% in Water intravenous solution  -- 50 milliliter(s) intravenous every hour  -- Indication: For Supportive    ocular lubricant ophthalmic solution  -- 1 drop(s) to both eyes 3 times a day, As needed, Dry Eyes  -- Indication: For Dry eyes    atropine 1% ophthalmic solution  -- 1 drop(s) under tongue 1 to 4 times a day, As Needed, for oral secretions  -- Indication: For Excessive oral secretions

## 2018-09-19 LAB
ALBUMIN SERPL ELPH-MCNC: 3.3 G/DL — SIGNIFICANT CHANGE UP (ref 3.3–5)
ALP SERPL-CCNC: 92 U/L — SIGNIFICANT CHANGE UP (ref 30–120)
ALT FLD-CCNC: 14 U/L DA — SIGNIFICANT CHANGE UP (ref 10–60)
ANION GAP SERPL CALC-SCNC: 13 MMOL/L — SIGNIFICANT CHANGE UP (ref 5–17)
AST SERPL-CCNC: 20 U/L — SIGNIFICANT CHANGE UP (ref 10–40)
BILIRUB SERPL-MCNC: 0.6 MG/DL — SIGNIFICANT CHANGE UP (ref 0.2–1.2)
BUN SERPL-MCNC: 12 MG/DL — SIGNIFICANT CHANGE UP (ref 7–23)
CALCIUM SERPL-MCNC: 8.3 MG/DL — LOW (ref 8.4–10.5)
CHLORIDE SERPL-SCNC: 91 MMOL/L — LOW (ref 96–108)
CO2 SERPL-SCNC: 20 MMOL/L — LOW (ref 22–31)
CREAT SERPL-MCNC: 1.18 MG/DL — SIGNIFICANT CHANGE UP (ref 0.5–1.3)
GLUCOSE SERPL-MCNC: 103 MG/DL — HIGH (ref 70–99)
HCT VFR BLD CALC: 40 % — SIGNIFICANT CHANGE UP (ref 39–50)
HGB BLD-MCNC: 13.4 G/DL — SIGNIFICANT CHANGE UP (ref 13–17)
MCHC RBC-ENTMCNC: 30.4 PG — SIGNIFICANT CHANGE UP (ref 27–34)
MCHC RBC-ENTMCNC: 33.5 GM/DL — SIGNIFICANT CHANGE UP (ref 32–36)
MCV RBC AUTO: 90.7 FL — SIGNIFICANT CHANGE UP (ref 80–100)
NRBC # BLD: 0 /100 WBCS — SIGNIFICANT CHANGE UP (ref 0–0)
PLATELET # BLD AUTO: 200 K/UL — SIGNIFICANT CHANGE UP (ref 150–400)
POTASSIUM SERPL-MCNC: 4.4 MMOL/L — SIGNIFICANT CHANGE UP (ref 3.5–5.3)
POTASSIUM SERPL-SCNC: 4.4 MMOL/L — SIGNIFICANT CHANGE UP (ref 3.5–5.3)
PROT SERPL-MCNC: 6.6 G/DL — SIGNIFICANT CHANGE UP (ref 6–8.3)
RBC # BLD: 4.41 M/UL — SIGNIFICANT CHANGE UP (ref 4.2–5.8)
RBC # FLD: 13.9 % — SIGNIFICANT CHANGE UP (ref 10.3–14.5)
SODIUM SERPL-SCNC: 124 MMOL/L — LOW (ref 135–145)
WBC # BLD: 11.39 K/UL — HIGH (ref 3.8–10.5)
WBC # FLD AUTO: 11.39 K/UL — HIGH (ref 3.8–10.5)

## 2018-09-19 PROCEDURE — 90792 PSYCH DIAG EVAL W/MED SRVCS: CPT

## 2018-09-19 RX ORDER — RISPERIDONE 4 MG/1
1 TABLET ORAL AT BEDTIME
Qty: 0 | Refills: 0 | Status: DISCONTINUED | OUTPATIENT
Start: 2018-09-19 | End: 2018-09-26

## 2018-09-19 RX ORDER — OLANZAPINE 15 MG/1
2.5 TABLET, FILM COATED ORAL EVERY 6 HOURS
Qty: 0 | Refills: 0 | Status: DISCONTINUED | OUTPATIENT
Start: 2018-09-19 | End: 2018-09-26

## 2018-09-19 RX ADMIN — CEFTRIAXONE 100 GRAM(S): 500 INJECTION, POWDER, FOR SOLUTION INTRAMUSCULAR; INTRAVENOUS at 20:04

## 2018-09-19 RX ADMIN — RISPERIDONE 1 MILLIGRAM(S): 4 TABLET ORAL at 21:21

## 2018-09-19 RX ADMIN — ENOXAPARIN SODIUM 30 MILLIGRAM(S): 100 INJECTION SUBCUTANEOUS at 12:05

## 2018-09-19 NOTE — SWALLOW BEDSIDE ASSESSMENT ADULT - PHARYNGEAL PHASE
Decreased laryngeal elevation/Multiple swallows/Delayed pharyngeal swallow/Wet vocal quality post oral intake/Cough post oral intake Cough post oral intake/Delayed pharyngeal swallow/Decreased laryngeal elevation/Multiple swallows/Wet vocal quality post oral intake

## 2018-09-19 NOTE — SWALLOW BEDSIDE ASSESSMENT ADULT - COMMENTS
Pt alert, nonverbal. Private aide is at bedside; she reported that pt was verbal with her earlier. She also reported that pt consumes puree consistencies with thin liquids at home but does not drink many liquids. Pt admitted with AMS, decreased PO intake, and dehydration. Pt known to ST department from previous admission. He was previously evaluated in September 2017 when he was safely tolerating puree consistencies with nectar thickened liquids. Pt presents with oropharyngeal dysphagia characterized by adequate oral prep, labored formation of the bolus, latent AP transport, swallow delay, reduced laryngeal elevation. Multiple swallows noted. Overt s/s of aspiration with all consistencies. Recommend NPO. Spoke with HCP Valery Barahona via telephone and discussed NPO status with her. Discussed with RN, MD.

## 2018-09-19 NOTE — BEHAVIORAL HEALTH ASSESSMENT NOTE - HPI (INCLUDE ILLNESS QUALITY, SEVERITY, DURATION, TIMING, CONTEXT, MODIFYING FACTORS, ASSOCIATED SIGNS AND SYMPTOMS)
89 yr old w/m, with history of dementia, who lives at home with 24h Home health Aide, brought in to the hospital after there was a change in mental status, poor intake and L- thumb nail is swollen. Pt was taking Risperidone prn for agitation before. Currently patient is agitated and confused, attempting to get out of the bed, and not responding to redirection.

## 2018-09-19 NOTE — SWALLOW BEDSIDE ASSESSMENT ADULT - ASR SWALLOW ASPIRATION MONITOR
cough/gurgly voice/fever/throat clearing/change of breathing pattern/pneumonia/upper respiratory infection

## 2018-09-20 PROCEDURE — 12345: CPT | Mod: NC

## 2018-09-20 RX ADMIN — ENOXAPARIN SODIUM 30 MILLIGRAM(S): 100 INJECTION SUBCUTANEOUS at 12:06

## 2018-09-20 RX ADMIN — RISPERIDONE 1 MILLIGRAM(S): 4 TABLET ORAL at 21:52

## 2018-09-20 RX ADMIN — SODIUM CHLORIDE 50 MILLILITER(S): 9 INJECTION, SOLUTION INTRAVENOUS at 05:40

## 2018-09-21 RX ADMIN — ENOXAPARIN SODIUM 30 MILLIGRAM(S): 100 INJECTION SUBCUTANEOUS at 12:00

## 2018-09-21 NOTE — CHART NOTE - NSCHARTNOTEFT_GEN_A_CORE
Assessment:   Pt admitted from home c AMS, agitation and decreased po intake/refusal of food/fluids. Pt was assessed by SLP and failed eval: NPO/alternative nutrition was recommended. Per RN case manager,  HCP spoke c Dr. Dale and wants to pursue hospice care. No feeding tube indicated. Goal is to maximize comfort. RD remains available prn.     Factors impacting intake: [ ] none [ ] nausea  [ ] vomiting [ ] diarrhea [ ] constipation  [ ]chewing problems [ ] swallowing issues  [ ] other:     Diet Presciption: Diet, NPO (09-19-18 @ 13:32)    Intake: NPO    Current Weight: Weight (kg): 59 (09-17 @ 12:32)  % Weight Change    Pertinent Medications: MEDICATIONS  (STANDING):  dextrose 5%. 1000 milliLiter(s) (50 mL/Hr) IV Continuous <Continuous>  enoxaparin Injectable 30 milliGRAM(s) SubCutaneous daily  risperiDONE   Tablet 1 milliGRAM(s) Oral at bedtime    MEDICATIONS  (PRN):  acetaminophen  Suppository .. 650 milliGRAM(s) Rectal every 8 hours PRN Temp greater or equal to 38C (100.4F), Moderate Pain (4 - 6)  OLANZapine Injectable 2.5 milliGRAM(s) IntraMuscular every 6 hours PRN acute agitation    Pertinent Labs: 09-19 Na124 mmol/L<L> Glu 103 mg/dL<H> K+ 4.4 mmol/L Cr  1.18 mg/dL BUN 12 mg/dL 09-19 Alb 3.3 g/dL     CAPILLARY BLOOD GLUCOSE        Skin: b/l heels and sacral p/u stage I    Estimated Needs:   [ ] no change since previous assessment  [ ] recalculated:     Previous Nutrition Diagnosis:   [x ] Inadequate Energy Intake [ ]Inadequate Oral Intake [ ] Excessive Energy Intake   [ ] Underweight [ ] Increased Nutrient Needs [ ] Overweight/Obesity   [ ] Altered GI Function [ ] Unintended Weight Loss [ ] Food & Nutrition Related Knowledge Deficit [ ] Malnutrition     Nutrition Diagnosis is [x ] ongoing  [ ] resolved [ ] not applicable     New Nutrition Diagnosis: [ ] not applicable       Interventions:   Recommend  [ ] Change Diet To:  [ ] Nutrition Supplement  [ ] Nutrition Support  [ x] Other: Hospice care to be pursued.    Monitoring and Evaluation:   [ ] PO intake [ x ] Tolerance to diet prescription [ x ] weights [ x ] labs[ x ] follow up per protocol  [ ] other: Assessment:   Pt admitted from home c AMS, agitation and decreased po intake/refusal of food/fluids. Pt was assessed by SLP and failed eval: NPO/alternative nutrition was recommended. Per RN case manager,  HCP spoke c Dr. Dale and wants to pursue hospice care. No feeding tube indicated. Goal is to maximize comfort. RD remains available prn.     Factors impacting intake: [ ] none [ ] nausea  [ ] vomiting [ ] diarrhea [ ] constipation  [ ]chewing problems [ ] swallowing issues  [ ] other:     Diet Presciption: Diet, NPO (09-19-18 @ 13:32)    Intake: NPO    Current Weight: Weight (kg): 59 (09-17 @ 12:32)  % Weight Change: no new weight available    Pertinent Medications: MEDICATIONS  (STANDING):  dextrose 5%. 1000 milliLiter(s) (50 mL/Hr) IV Continuous <Continuous>  enoxaparin Injectable 30 milliGRAM(s) SubCutaneous daily  risperiDONE   Tablet 1 milliGRAM(s) Oral at bedtime    MEDICATIONS  (PRN):  acetaminophen  Suppository .. 650 milliGRAM(s) Rectal every 8 hours PRN Temp greater or equal to 38C (100.4F), Moderate Pain (4 - 6)  OLANZapine Injectable 2.5 milliGRAM(s) IntraMuscular every 6 hours PRN acute agitation    Pertinent Labs: 09-19 Na124 mmol/L<L> Glu 103 mg/dL<H> K+ 4.4 mmol/L Cr  1.18 mg/dL BUN 12 mg/dL 09-19 Alb 3.3 g/dL     CAPILLARY BLOOD GLUCOSE        Skin: b/l heels and sacral p/u stage I    Estimated Needs:   [ ] no change since previous assessment  [ ] recalculated:     Previous Nutrition Diagnosis:   [x ] Inadequate Energy Intake [ ]Inadequate Oral Intake [ ] Excessive Energy Intake   [ ] Underweight [ ] Increased Nutrient Needs [ ] Overweight/Obesity   [ ] Altered GI Function [ ] Unintended Weight Loss [ ] Food & Nutrition Related Knowledge Deficit [ ] Malnutrition     Nutrition Diagnosis is [x ] ongoing  [ ] resolved [ ] not applicable     New Nutrition Diagnosis: [ ] not applicable       Interventions:   Recommend  [ ] Change Diet To:  [ ] Nutrition Supplement  [ ] Nutrition Support  [ x] Other: Hospice care to be pursued.    Monitoring and Evaluation:   [ ] PO intake [ x ] Tolerance to diet prescription [ x ] weights [ x ] labs[ x ] follow up per protocol  [ ] other:

## 2018-09-22 RX ORDER — ATROPINE SULFATE 1 %
2 DROPS OPHTHALMIC (EYE)
Qty: 0 | Refills: 0 | Status: DISCONTINUED | OUTPATIENT
Start: 2018-09-22 | End: 2018-09-26

## 2018-09-22 RX ORDER — MORPHINE SULFATE 50 MG/1
2 CAPSULE, EXTENDED RELEASE ORAL
Qty: 0 | Refills: 0 | Status: DISCONTINUED | OUTPATIENT
Start: 2018-09-22 | End: 2018-09-26

## 2018-09-22 RX ADMIN — MORPHINE SULFATE 2 MILLIGRAM(S): 50 CAPSULE, EXTENDED RELEASE ORAL at 21:52

## 2018-09-22 RX ADMIN — SODIUM CHLORIDE 50 MILLILITER(S): 9 INJECTION, SOLUTION INTRAVENOUS at 11:15

## 2018-09-22 RX ADMIN — ENOXAPARIN SODIUM 30 MILLIGRAM(S): 100 INJECTION SUBCUTANEOUS at 11:15

## 2018-09-22 RX ADMIN — MORPHINE SULFATE 2 MILLIGRAM(S): 50 CAPSULE, EXTENDED RELEASE ORAL at 21:35

## 2018-09-22 NOTE — CONSULT NOTE ADULT - SUBJECTIVE AND OBJECTIVE BOX
Date/Time Patient Seen:  		  Referring MD:   Data Reviewed	       Patient is a 89y old  Male who presents with a chief complaint of AMS, Agitation and Poor Appetite (22 Sep 2018 13:19)      Subjective/HPI     PAST MEDICAL & SURGICAL HISTORY:  Hypothyroid  Dementia with behavioral disturbance  No pertinent past medical history  No significant past surgical history        Medication list         MEDICATIONS  (STANDING):  dextrose 5%. 1000 milliLiter(s) (50 mL/Hr) IV Continuous <Continuous>  enoxaparin Injectable 30 milliGRAM(s) SubCutaneous daily  risperiDONE   Tablet 1 milliGRAM(s) Oral at bedtime    MEDICATIONS  (PRN):  acetaminophen  Suppository .. 650 milliGRAM(s) Rectal every 8 hours PRN Temp greater or equal to 38C (100.4F), Moderate Pain (4 - 6)  artificial  tears Solution 1 Drop(s) Both EYES three times a day PRN Dry Eyes  atropine 1% Ophthalmic Solution for SubLingual Use 2 Drop(s) SubLingual four times a day PRN oral secretions2  morphine  - Injectable 2 milliGRAM(s) IV Push every 2 hours PRN pain, distress, dyspnea  OLANZapine Injectable 2.5 milliGRAM(s) IntraMuscular every 6 hours PRN acute agitation         Vitals log        ICU Vital Signs Last 24 Hrs  T(C): 37.1 (22 Sep 2018 17:15), Max: 37.1 (22 Sep 2018 17:15)  T(F): 98.7 (22 Sep 2018 17:15), Max: 98.7 (22 Sep 2018 17:15)  HR: 92 (22 Sep 2018 17:15) (92 - 112)  BP: 163/83 (22 Sep 2018 17:15) (130/82 - 179/88)  BP(mean): --  ABP: --  ABP(mean): --  RR: 18 (22 Sep 2018 17:15) (18 - 19)  SpO2: 95% (22 Sep 2018 17:15) (94% - 99%)           Input and Output:  I&O's Detail    21 Sep 2018 07:01  -  22 Sep 2018 07:00  --------------------------------------------------------  IN:    dextrose 5%.: 1400 mL  Total IN: 1400 mL    OUT:  Total OUT: 0 mL    Total NET: 1400 mL      22 Sep 2018 07:01  -  22 Sep 2018 17:25  --------------------------------------------------------  IN:    dextrose 5%.: 250 mL  Total IN: 250 mL    OUT:  Total OUT: 0 mL    Total NET: 250 mL          Lab Data                  Review of Systems	      Objective     Physical Examination        Pertinent Lab findings & Imaging      Renea:  NO   Adequate UO     I&O's Detail    21 Sep 2018 07:01  -  22 Sep 2018 07:00  --------------------------------------------------------  IN:    dextrose 5%.: 1400 mL  Total IN: 1400 mL    OUT:  Total OUT: 0 mL    Total NET: 1400 mL      22 Sep 2018 07:01  -  22 Sep 2018 17:25  --------------------------------------------------------  IN:    dextrose 5%.: 250 mL  Total IN: 250 mL    OUT:  Total OUT: 0 mL    Total NET: 250 mL               Discussed with:     Cultures:	        Radiology Date/Time Patient Seen:  		  Referring MD:   Data Reviewed	       Patient is a 89y old  Male who presents with a chief complaint of AMS, Agitation and Poor Appetite (22 Sep 2018 13:19)      Subjective/HPI    seen and examined  vs and meds reviewed  H and P reviewed  ER provider note reviewed  labs and imaging reviewed  pt is a poor historian    Goals of Care Conversation - Personal Advance Directive [Charted Location: [place] [address]] [Authored: 21-Sep-2018 10:20]- for Visit: [id number], Complete, Entered, Signed in Full, General    Goals of Care Conversation:    Participants:  · Child(maría elena)	Valery Barahona     Advance Directives:  · Does patient have Advance Directive	Yes  · Indicate Type	Health Care Proxy (HCP)  · Agent's Name	Valery Barahona  · Phone #	[phone]  · Are any of the items on the chart	no     Conversation Discussion:  · Conversation	MOLST Discussed  · Conversation Details	Previous Molst by dtr  copy on chart.She now wants to consider a feeding tube.Pt is DNR DNI.Dr Dale aware.      Consult Note Adult-Infectious Disease Attending [Charted Location: [place] [address]] [Authored: 18-Sep-2018 11:16]- for Visit: [id number], Complete, Entered, Signed in Full, General    Consult Note:   · Provider Specialty	Infectious Disease    Referral/Consultation:    Initial Consult:  · Requested by Name:	Dr Dale  · Date/Time:	18-Sep-2018 11:17  · Reason for Referral/Consultation:	Leukocytosis  · Reason for Admission	AMS, Agitation and Poor Appetite      · Subjective and Objective:     HPI:  89 yr old PMH advanced Dementia hypothyroid from group home brought to hospital with   CC agitation and decreased po intake. Pt unable to provide hx. No fever n/v/d. In ED noted   to have leukocytosis with CKD. No fever but overnight had fever Tmax 100.8. Pt appears to   be in no distress and calm. Nonverbal. No rash. CXR and UA negative.    Infectious Disease consult was called to evaluate pt for poss infectious process.    Past Medical & Surgical Hx:  PAST MEDICAL & SURGICAL HISTORY:  Hypothyroid  Dementia with behavioral disturbance  No significant past surgical history     PAST MEDICAL & SURGICAL HISTORY:  Hypothyroid  Dementia with behavioral disturbance  No pertinent past medical history  No significant past surgical history        Medication list         MEDICATIONS  (STANDING):  dextrose 5%. 1000 milliLiter(s) (50 mL/Hr) IV Continuous <Continuous>  enoxaparin Injectable 30 milliGRAM(s) SubCutaneous daily  risperiDONE   Tablet 1 milliGRAM(s) Oral at bedtime    MEDICATIONS  (PRN):  acetaminophen  Suppository .. 650 milliGRAM(s) Rectal every 8 hours PRN Temp greater or equal to 38C (100.4F), Moderate Pain (4 - 6)  artificial  tears Solution 1 Drop(s) Both EYES three times a day PRN Dry Eyes  atropine 1% Ophthalmic Solution for SubLingual Use 2 Drop(s) SubLingual four times a day PRN oral secretions2  morphine  - Injectable 2 milliGRAM(s) IV Push every 2 hours PRN pain, distress, dyspnea  OLANZapine Injectable 2.5 milliGRAM(s) IntraMuscular every 6 hours PRN acute agitation         Vitals log        ICU Vital Signs Last 24 Hrs  T(C): 37.1 (22 Sep 2018 17:15), Max: 37.1 (22 Sep 2018 17:15)  T(F): 98.7 (22 Sep 2018 17:15), Max: 98.7 (22 Sep 2018 17:15)  HR: 92 (22 Sep 2018 17:15) (92 - 112)  BP: 163/83 (22 Sep 2018 17:15) (130/82 - 179/88)  BP(mean): --  ABP: --  ABP(mean): --  RR: 18 (22 Sep 2018 17:15) (18 - 19)  SpO2: 95% (22 Sep 2018 17:15) (94% - 99%)           Input and Output:  I&O's Detail    21 Sep 2018 07:01  -  22 Sep 2018 07:00  --------------------------------------------------------  IN:    dextrose 5%.: 1400 mL  Total IN: 1400 mL    OUT:  Total OUT: 0 mL    Total NET: 1400 mL      22 Sep 2018 07:01  -  22 Sep 2018 17:25  --------------------------------------------------------  IN:    dextrose 5%.: 250 mL  Total IN: 250 mL    OUT:  Total OUT: 0 mL    Total NET: 250 mL          Lab Data                  Review of Systems	      Objective     Physical Examination    heart s1s2  lung dec BS  abd soft  frail  weak      Pertinent Lab findings & Imaging      Renea:  NO   Adequate UO     I&O's Detail    21 Sep 2018 07:01  -  22 Sep 2018 07:00  --------------------------------------------------------  IN:    dextrose 5%.: 1400 mL  Total IN: 1400 mL    OUT:  Total OUT: 0 mL    Total NET: 1400 mL      22 Sep 2018 07:01  -  22 Sep 2018 17:25  --------------------------------------------------------  IN:    dextrose 5%.: 250 mL  Total IN: 250 mL    OUT:  Total OUT: 0 mL    Total NET: 250 mL               Discussed with:     Cultures:	        Radiology

## 2018-09-22 NOTE — CONSULT NOTE ADULT - PROBLEM SELECTOR RECOMMENDATION 9
dementia  dysphagia  recurrent aspiration  will discuss with family re - issue of feeding tube  will follow  supportive medical regimen and palliative regimen  ID follow up noted  assist with ADL  prognosis guarded to poor  pt is DNR DNI

## 2018-09-23 LAB
CULTURE RESULTS: SIGNIFICANT CHANGE UP
CULTURE RESULTS: SIGNIFICANT CHANGE UP
SPECIMEN SOURCE: SIGNIFICANT CHANGE UP
SPECIMEN SOURCE: SIGNIFICANT CHANGE UP

## 2018-09-23 RX ADMIN — SODIUM CHLORIDE 50 MILLILITER(S): 9 INJECTION, SOLUTION INTRAVENOUS at 22:12

## 2018-09-23 RX ADMIN — MORPHINE SULFATE 2 MILLIGRAM(S): 50 CAPSULE, EXTENDED RELEASE ORAL at 22:13

## 2018-09-23 RX ADMIN — Medication 2 DROP(S): at 05:29

## 2018-09-23 RX ADMIN — MORPHINE SULFATE 2 MILLIGRAM(S): 50 CAPSULE, EXTENDED RELEASE ORAL at 22:02

## 2018-09-23 RX ADMIN — ENOXAPARIN SODIUM 30 MILLIGRAM(S): 100 INJECTION SUBCUTANEOUS at 14:21

## 2018-09-23 NOTE — PROGRESS NOTE ADULT - PROBLEM SELECTOR PLAN 1
dementia  dysphagia  frail and weak  remains NPO  spoke with daughter, she is waiting to speak with Hospice regarding placement  prognosis poor, she is aware  pt is DNR DNI  will follow  PRN Morphine on order

## 2018-09-23 NOTE — CHART NOTE - NSCHARTNOTEFT_GEN_A_CORE
Upon Nutritional Assessment by the Registered Dietitian your patient was determined to meet criteria / has evidence of the following diagnosis/diagnoses:          [ ]  Mild Protein Calorie Malnutrition        [ ]  Moderate Protein Calorie Malnutrition        [x ] Severe Protein Calorie Malnutrition        [ ] Unspecified Protein Calorie Malnutrition        [ ] Underweight / BMI <19        [ ] Morbid Obesity / BMI > 40      Findings as based on:  •  Comprehensive nutrition assessment and consultation  •  Calorie counts (nutrient intake analysis)  •  Food acceptance and intake status from observations by staff  •  Follow up  •  Patient education  •  Intervention secondary to interdisciplinary rounds  •   concerns      Treatment:    The following diet has been recommended: NPO: pt unable to safety swallow any texture diet.       PROVIDER Section:     By signing this assessment you are acknowledging and agree with the diagnosis/diagnoses assigned by the Registered Dietitian    Comments:        9/21/18  Pt admitted from home c AMS, agitation and decreased po intake/refusal of food/fluids. Pt was assessed by SLP and failed eval: NPO/alternative nutrition was recommended. Per RN case manager,  HCP spoke c Dr. Dale and wants to pursue hospice care. No feeding tube indicated. Goal is to maximize comfort. RD remains available prn.     9/23/18  At this time pt meets criteria for severe malnutrition in context of acute illness/injury 2/2:  1. Per nutrition focused physical exam pt found to have moderate-severe occipital and shoulder wasting as well as protruding clavicle and fat loss noted to triceps.  2. Pt has consumed <50% of estimated needs x 5 days or > as evidenced by NPO status since 9/19

## 2018-09-24 RX ORDER — ATROPINE SULFATE 1 %
1 DROPS OPHTHALMIC (EYE)
Qty: 0 | Refills: 0 | COMMUNITY
Start: 2018-09-24

## 2018-09-24 RX ORDER — MORPHINE SULFATE 50 MG/1
2 CAPSULE, EXTENDED RELEASE ORAL
Qty: 0 | Refills: 0 | COMMUNITY
Start: 2018-09-24

## 2018-09-24 RX ORDER — SODIUM CHLORIDE 9 MG/ML
50 INJECTION, SOLUTION INTRAVENOUS
Qty: 0 | Refills: 0 | COMMUNITY
Start: 2018-09-24

## 2018-09-24 RX ADMIN — Medication 2 DROP(S): at 23:57

## 2018-09-24 RX ADMIN — MORPHINE SULFATE 2 MILLIGRAM(S): 50 CAPSULE, EXTENDED RELEASE ORAL at 06:26

## 2018-09-24 RX ADMIN — ENOXAPARIN SODIUM 30 MILLIGRAM(S): 100 INJECTION SUBCUTANEOUS at 11:20

## 2018-09-24 RX ADMIN — MORPHINE SULFATE 2 MILLIGRAM(S): 50 CAPSULE, EXTENDED RELEASE ORAL at 23:57

## 2018-09-24 RX ADMIN — MORPHINE SULFATE 2 MILLIGRAM(S): 50 CAPSULE, EXTENDED RELEASE ORAL at 05:41

## 2018-09-24 NOTE — GOALS OF CARE CONVERSATION - PERSONAL ADVANCE DIRECTIVE - CONVERSATION DETAILS
Pt approved for In pt HSP services this am. Spoke with Valery pt's HCP and she agrees to In pt HSP at the [facility]. I explained to Valery that HSP consents need to be signed. She states she will be arriving at the [facility] within the hr to review and sign the consents. I have requested that she call me once she arrives. Once consents are signed pt may be transferred to the Hospice [facility] today. We will cont to f/u.       Valerie Redding RN
Spoke with pt's HCP Valery Barahona. HSP services and POC for home and In pt discussed. She would prefer In pt HSP services at the [facility] when appropriate. I spoke with Dr. Mccullough, [facility] medical physician at the [facility]osp[facility] and he would like for the pt to be re-evaluated tomorrow. In the event pt's condition does not improve he will approve for pt to transfer to the [facility] for HSP services. I explained the above to Valery and pt's primary nurse. Valery is in agreement and will sign the HSP consents when she visits this evening. I have left the [facility] folder with prepared consents. Once consents are signed they can be faxed to our main HSP office at [phone]. We will f/u in the AM.       Valerie Redding RN
Previous Molst by dtr  copy on chart.She now wants to consider a feeding tube.Pt is DNR DNI.Dr Dale aware.

## 2018-09-24 NOTE — PROGRESS NOTE ADULT - PROBLEM SELECTOR PLAN 1
advanced dementia  frail and weak  needs assist with ADL  fall prec  supportive measures  monitor for behavioral outbursts   oral hygiene  skin care  medication regimen Rx for agitation  re- evaluation for hospice placement today  pt is DNR DNI

## 2018-09-24 NOTE — PROGRESS NOTE ADULT - ATTENDING COMMENTS
I examined the pt

## 2018-09-25 RX ADMIN — MORPHINE SULFATE 2 MILLIGRAM(S): 50 CAPSULE, EXTENDED RELEASE ORAL at 00:32

## 2018-09-25 RX ADMIN — ENOXAPARIN SODIUM 30 MILLIGRAM(S): 100 INJECTION SUBCUTANEOUS at 12:59

## 2018-09-25 NOTE — PROGRESS NOTE ADULT - PROVIDER SPECIALTY LIST ADULT
Infectious Disease
Internal Medicine
Palliative Care
Palliative Care
Internal Medicine

## 2018-09-25 NOTE — PROGRESS NOTE ADULT - REASON FOR ADMISSION
AMS, Agitation and Poor Appetite

## 2018-09-25 NOTE — PROGRESS NOTE ADULT - SUBJECTIVE AND OBJECTIVE BOX
Patient is a 89y old  Male who presents with a chief complaint of AMS, Agitation and Poor Appetite (24 Sep 2018 07:31)      INTERVAL HPI/OVERNIGHT EVENTS:  Pt is more lethargic, responds to pain    Vital Signs Last 24 Hrs  T(C): 36.8 (24 Sep 2018 10:11), Max: 37.1 (23 Sep 2018 17:15)  T(F): 98.2 (24 Sep 2018 10:11), Max: 98.7 (23 Sep 2018 17:15)  HR: 82 (24 Sep 2018 10:11) (82 - 96)  BP: 157/87 (24 Sep 2018 10:11) (132/76 - 168/78)  BP(mean): --  RR: 16 (24 Sep 2018 10:11) (16 - 18)  SpO2: 96% (24 Sep 2018 10:11) (95% - 100%)    Allergies    No Known Allergies    Intolerances        MEDICATIONS  (STANDING):  dextrose 5%. 1000 milliLiter(s) (50 mL/Hr) IV Continuous <Continuous>  enoxaparin Injectable 30 milliGRAM(s) SubCutaneous daily  risperiDONE   Tablet 1 milliGRAM(s) Oral at bedtime    MEDICATIONS  (PRN):  acetaminophen  Suppository .. 650 milliGRAM(s) Rectal every 8 hours PRN Temp greater or equal to 38C (100.4F), Moderate Pain (4 - 6)  artificial  tears Solution 1 Drop(s) Both EYES three times a day PRN Dry Eyes  atropine 1% Ophthalmic Solution for SubLingual Use 2 Drop(s) SubLingual four times a day PRN oral secretions2  morphine  - Injectable 2 milliGRAM(s) IV Push every 2 hours PRN pain, distress, dyspnea  OLANZapine Injectable 2.5 milliGRAM(s) IntraMuscular every 6 hours PRN acute agitation      LABORATORY:                              RADIOLOGY & ADDITIONAL TESTS:      PHYSICAL EXAM:  GENERAL: No acute distress   HEAD: Normocephalic  EYES: EOMI, PERRLA, conjunctiva and sclera clear  NECK: Supple, No JVD, Normal thyroid  NERVOUS SYSTEM: Lethargic, responds to pain  CHEST/LUNG: diminished breath sounds  HEART: Regular rate and rhythm; No murmurs, rubs, or gallops  ABDOMEN: Soft, Nontender, Nondistended; Bowel sounds present  EXTREMITIES:  No edema  LYMPH: No lymphadenopathy noted  SKIN: No rashes or lesions      Consultant(s) Notes Reviewed:  YES         Care Discussed with Consultants/Other Providers   YES         ASSESSESSMENT:      PLAN:
[facility] patient examined and chart reviewed.    INTERVAL HPI/ OVERNIGHT EVENTS:   Afebrile. No distress.  Lethargic. Confused.    PAST MEDICAL & SURGICAL HISTORY:  Hypothyroid  Dementia with behavioral disturbance  No significant past surgical history      For details regarding the patient's social history, family history, and other miscellaneous elements, please refer the initial infectious diseases consultation and/or the admitting history and physical examination for this admission.    ROS:  Unable to obtain due to : Minimally verbal confused    Current inpatient medications :    ANTIBIOTICS/RELEVANT:  MEDICATIONS  (STANDING):  dextrose 5%. 1000 milliLiter(s) (50 mL/Hr) IV Continuous <Continuous>  enoxaparin Injectable 30 milliGRAM(s) SubCutaneous daily  risperiDONE   Tablet 1 milliGRAM(s) Oral at bedtime    MEDICATIONS  (PRN):  acetaminophen  Suppository .. 650 milliGRAM(s) Rectal every 8 hours PRN Temp greater or equal to 38C (100.4F), Moderate Pain (4 - 6)  OLANZapine Injectable 2.5 milliGRAM(s) IntraMuscular every 6 hours PRN acute agitation      Objective:  Vital Signs Last 24 Hrs  T(C): 36.4 (21 Sep 2018 09:23), Max: 37.3 (20 Sep 2018 21:32)  T(F): 97.6 (21 Sep 2018 09:23), Max: 99.2 (20 Sep 2018 21:32)  HR: 96 (21 Sep 2018 09:23) (90 - 105)  BP: 137/74 (21 Sep 2018 09:23) (108/76 - 175/97)  BP(mean): --  RR: 20 (21 Sep 2018 09:23) (18 - 20)  SpO2: 98% (21 Sep 2018 09:23) (95% - 99%)    Physical Exam:  General: no acute distress lethargic  Eyes: sclera anicteric, pupils equal and reactive to light  ENMT: buccal mucosa moist, pharynx not injected  Neck: supple, trachea midline  Lungs: Decreased no wheeze/rhonchi  Cardiovascular: regular rate and rhythm, S1 S2  Abdomen: soft, nontender, no organomegaly present, bowel sounds normal  Neurological: confused advanced dementia  Skin: no increased ecchymosis/petechiae/purpura  Lymph Nodes: no palpable cervical/supraclavicular lymph nodes enlargements  Extremities: no cyanosis/clubbing/edema      LABS:                        13.4   11.39 )-----------( 200      ( 19 Sep 2018 06:55 )             40.0   09-19    124<L>  |  91<L>  |  12  ----------------------------<  103<H>  4.4   |  20<L>  |  1.18    Ca    8.3<L>      19 Sep 2018 07:29    TPro  6.6  /  Alb  3.3  /  TBili  0.6  /  DBili  x   /  AST  20  /  ALT  14  /  AlkPhos  92  09-19    MICROBIOLOGY:  Culture - Urine (collected 18 Sep 2018 00:17)  Source: .Urine Catheterized  Final Report (18 Sep 2018 22:56):    No growth    Culture - Blood (collected 17 Sep 2018 23:33)  Source: .Blood Blood-Peripheral  Preliminary Report (19 Sep 2018 01:02):    No growth to date.    Culture - Blood (collected 17 Sep 2018 23:33)  Source: .Blood Blood-Peripheral  Preliminary Report (19 Sep 2018 01:02):    No growth to date.    RADIOLOGY & ADDITIONAL STUDIES:    EXAM:  XR CHEST PORTABLE ROUTINE 1V                                  PROCEDURE DATE:  09/17/2018          INTERPRETATION:  Clinical information: Altered mental status    Comparison study dated 9/10/2017    Portable study, 1:20 PM    No evidence of infiltrate effusion or congestive failure. Heart size   within normal limits. Hilar regions mediastinal contours and bony thorax   are intact.    IMPRESSION: No active disease.      Assessment :  89 yr old PMH advanced Dementia hypothyroid from group home brought to hospital with   CC agitation and decreased po intake.  Dementia with behavioral disturbance  BRAYDEN sec dehydration resolved  Leukocytosis and fever likely reactive resolved  CXR neg  UA neg  Septic work up negative  Failure to thrive    Plan :   Monitor off antibiotic  Trend temps and wbc  High risk for asp pna  Asp precautions  HCP [facility]      D/w Dr Dale    Continue with present regiment.  Appropriate use of antibiotics and adverse effects reviewed.      I have discussed the above plan of care with patient/ family in detail. They expressed understanding of the  treatment plan . Risks, benefits and alternatives discussed in detail. I have asked if they have any questions or concerns and appropriately addressed them to the best of my ability .    > 35 minutes were spent in direct patient care reviewing notes, medications ,labs data/ imaging , discussion with multidisciplinary team.    Thank you for allowing me to participate in care of your patient .    Rik Erwin MD  Infectious Disease  [phone]
[facility] patient examined and chart reviewed.    INTERVAL HPI/ OVERNIGHT EVENTS:   Afebrile. No distress.  Lethargic.    PAST MEDICAL & SURGICAL HISTORY:  Hypothyroid  Dementia with behavioral disturbance  No significant past surgical history      For details regarding the patient's social history, family history, and other miscellaneous elements, please refer the initial infectious diseases consultation and/or the admitting history and physical examination for this admission.    ROS:  Unable to obtain due to : Minimally verbal confused    Current inpatient medications :    ANTIBIOTICS/RELEVANT:  MEDICATIONS  (STANDING):  dextrose 5%. 1000 milliLiter(s) (50 mL/Hr) IV Continuous <Continuous>  enoxaparin Injectable 30 milliGRAM(s) SubCutaneous daily  risperiDONE   Tablet 1 milliGRAM(s) Oral at bedtime    MEDICATIONS  (PRN):  acetaminophen  Suppository .. 650 milliGRAM(s) Rectal every 8 hours PRN Temp greater or equal to 38C (100.4F), Moderate Pain (4 - 6)  OLANZapine Injectable 2.5 milliGRAM(s) IntraMuscular every 6 hours PRN acute agitation    Objective:  Vital Signs Last 24 Hrs  T(C): 36.5 (20 Sep 2018 14:00), Max: 37.1 (19 Sep 2018 21:11)  T(F): 97.7 (20 Sep 2018 14:00), Max: 98.8 (19 Sep 2018 21:11)  HR: 96 (20 Sep 2018 14:00) (90 - 107)  BP: 175/97 (20 Sep 2018 14:00) (123/97 - 183/99)  BP(mean): --  RR: 18 (20 Sep 2018 14:00) (16 - 20)  SpO2: 98% (20 Sep 2018 14:00) (95% - 100%)    Physical Exam:  General: no acute distress  Eyes: sclera anicteric, pupils equal and reactive to light  ENMT: buccal mucosa moist, pharynx not injected  Neck: supple, trachea midline  Lungs: Decreased no wheeze/rhonchi  Cardiovascular: regular rate and rhythm, S1 S2  Abdomen: soft, nontender, no organomegaly present, bowel sounds normal  Neurological: confused advanced dementia  Skin: no increased ecchymosis/petechiae/purpura  Lymph Nodes: no palpable cervical/supraclavicular lymph nodes enlargements  Extremities: no cyanosis/clubbing/edema      LABS:                        13.4   11.39 )-----------( 200      ( 19 Sep 2018 06:55 )             40.0   09-19    124<L>  |  91<L>  |  12  ----------------------------<  103<H>  4.4   |  20<L>  |  1.18    Ca    8.3<L>      19 Sep 2018 07:29    TPro  6.6  /  Alb  3.3  /  TBili  0.6  /  DBili  x   /  AST  20  /  ALT  14  /  AlkPhos  92  09-19    MICROBIOLOGY:  Culture - Urine (collected 18 Sep 2018 00:17)  Source: .Urine Catheterized  Final Report (18 Sep 2018 22:56):    No growth    Culture - Blood (collected 17 Sep 2018 23:33)  Source: .Blood Blood-Peripheral  Preliminary Report (19 Sep 2018 01:02):    No growth to date.    Culture - Blood (collected 17 Sep 2018 23:33)  Source: .Blood Blood-Peripheral  Preliminary Report (19 Sep 2018 01:02):    No growth to date.    RADIOLOGY & ADDITIONAL STUDIES:    EXAM:  XR CHEST PORTABLE ROUTINE 1V                                  PROCEDURE DATE:  09/17/2018          INTERPRETATION:  Clinical information: Altered mental status    Comparison study dated 9/10/2017    Portable study, 1:20 PM    No evidence of infiltrate effusion or congestive failure. Heart size   within normal limits. Hilar regions mediastinal contours and bony thorax   are intact.    IMPRESSION: No active disease.      Assessment :  89 yr old PMH advanced Dementia hypothyroid from group home brought to hospital with   CC agitation and decreased po intake.  Dementia with behavioral disturbance  BRAYDEN sec dehydration resolved  Leukocytosis and fever likely reactive resolved  CXR neg  UA neg  Septic work up negative    Plan :   Monitor off antibiotic  Trend temps and wbc  High risk for asp pna  Asp precautions       Continue with present regiment.  Appropriate use of antibiotics and adverse effects reviewed.      I have discussed the above plan of care with patient/ family in detail. They expressed understanding of the  treatment plan . Risks, benefits and alternatives discussed in detail. I have asked if they have any questions or concerns and appropriately addressed them to the best of my ability .    > 35 minutes were spent in direct patient care reviewing notes, medications ,labs data/ imaging , discussion with multidisciplinary team.    Thank you for allowing me to participate in care of your patient .    Rik Erwin MD  Infectious Disease  [phone]
AZUL VILLA is a yMale , patient examined and chart reviewed.    INTERVAL HPI/ OVERNIGHT EVENTS:   Afebrile. Lethargic.     PAST MEDICAL & SURGICAL HISTORY:  Hypothyroid  Dementia with behavioral disturbance  No significant past surgical history      For details regarding the patient's social history, family history, and other miscellaneous elements, please refer the initial infectious diseases consultation and/or the admitting history and physical examination for this admission.    ROS:  Unable to obtain due to : Minimally verbal confused    Current inpatient medications :  MEDICATIONS  (STANDING):  dextrose 5%. 1000 milliLiter(s) (50 mL/Hr) IV Continuous <Continuous>  enoxaparin Injectable 30 milliGRAM(s) SubCutaneous daily  risperiDONE   Tablet 1 milliGRAM(s) Oral at bedtime    MEDICATIONS  (PRN):  acetaminophen  Suppository .. 650 milliGRAM(s) Rectal every 8 hours PRN Temp greater or equal to 38C (100.4F), Moderate Pain (4 - 6)  artificial  tears Solution 1 Drop(s) Both EYES three times a day PRN Dry Eyes  atropine 1% Ophthalmic Solution for SubLingual Use 2 Drop(s) SubLingual four times a day PRN oral secretions2  morphine  - Injectable 2 milliGRAM(s) IV Push every 2 hours PRN pain, distress, dyspnea  OLANZapine Injectable 2.5 milliGRAM(s) IntraMuscular every 6 hours PRN acute agitation    Objective:  Vital Signs Last 24 Hrs  T(C): 37.1 (22 Sep 2018 17:15), Max: 37.1 (22 Sep 2018 17:15)  T(F): 98.7 (22 Sep 2018 17:15), Max: 98.7 (22 Sep 2018 17:15)  HR: 92 (22 Sep 2018 17:15) (92 - 112)  BP: 163/83 (22 Sep 2018 17:15) (130/82 - 179/88)  BP(mean): --  RR: 18 (22 Sep 2018 17:15) (18 - 19)  SpO2: 95% (22 Sep 2018 17:15) (94% - 99%)    Physical Exam:  General: no acute distress lethargic  Eyes: sclera anicteric, pupils equal and reactive to light  ENMT: buccal mucosa moist, pharynx not injected  Neck: supple, trachea midline  Lungs: Decreased no wheeze/rhonchi  Cardiovascular: regular rate and rhythm, S1 S2  Abdomen: soft, nontender, no organomegaly present, bowel sounds normal  Neurological: confused advanced dementia  Skin: no increased ecchymosis/petechiae/purpura  Lymph Nodes: no palpable cervical/supraclavicular lymph nodes enlargements  Extremities: no cyanosis/clubbing/edema      LABS:                        13.4   11.39 )-----------( 200      ( 19 Sep 2018 06:55 )             40.0   09-19    124<L>  |  91<L>  |  12  ----------------------------<  103<H>  4.4   |  20<L>  |  1.18    Ca    8.3<L>      19 Sep 2018 07:29    TPro  6.6  /  Alb  3.3  /  TBili  0.6  /  DBili  x   /  AST  20  /  ALT  14  /  AlkPhos  92  09-19    MICROBIOLOGY:  Culture - Urine (collected 18 Sep 2018 00:17)  Source: .Urine Catheterized  Final Report (18 Sep 2018 22:56):    No growth    Culture - Blood (collected 17 Sep 2018 23:33)  Source: .Blood Blood-Peripheral  Preliminary Report (19 Sep 2018 01:02):    No growth to date.    Culture - Blood (collected 17 Sep 2018 23:33)  Source: .Blood Blood-Peripheral  Preliminary Report (19 Sep 2018 01:02):    No growth to date.    RADIOLOGY & ADDITIONAL STUDIES:    EXAM:  XR CHEST PORTABLE ROUTINE 1V                                  PROCEDURE DATE:  09/17/2018          INTERPRETATION:  Clinical information: Altered mental status    Comparison study dated 9/10/2017    Portable study, 1:20 PM    No evidence of infiltrate effusion or congestive failure. Heart size   within normal limits. Hilar regions mediastinal contours and bony thorax   are intact.    IMPRESSION: No active disease.      Assessment :  89 yr old PMH advanced Dementia hypothyroid from group home brought to hospital with   CC agitation and decreased po intake.  Dementia with behavioral disturbance  BRAYDEN sec dehydration resolved  Leukocytosis and fever likely reactive resolved  CXR neg  UA neg  Septic work up negative  Failure to thrive  Doing poorly    Plan :   Monitor off antibiotic  High risk for asp pna  Asp precautions  For Hospice        Continue with present regiment.  Appropriate use of antibiotics and adverse effects reviewed.      I have discussed the above plan of care with patient/ family in detail. They expressed understanding of the  treatment plan . Risks, benefits and alternatives discussed in detail. I have asked if they have any questions or concerns and appropriately addressed them to the best of my ability .    > 35 minutes were spent in direct patient care reviewing notes, medications ,labs data/ imaging , discussion with multidisciplinary team.    Thank you for allowing me to participate in care of your patient .    Rik Erwin MD  Infectious Disease  [phone]
AZUL VILLA is a yMale , patient examined and chart reviewed.    INTERVAL HPI/ OVERNIGHT EVENTS:   Afebrile. Lethargic.   No events.    PAST MEDICAL & SURGICAL HISTORY:  Hypothyroid  Dementia with behavioral disturbance  No significant past surgical history      For details regarding the patient's social history, family history, and other miscellaneous elements, please refer the initial infectious diseases consultation and/or the admitting history and physical examination for this admission.    ROS:  Unable to obtain due to : Minimally verbal confused    Current inpatient medications :  MEDICATIONS  (STANDING):  dextrose 5%. 1000 milliLiter(s) (50 mL/Hr) IV Continuous <Continuous>  enoxaparin Injectable 30 milliGRAM(s) SubCutaneous daily  risperiDONE   Tablet 1 milliGRAM(s) Oral at bedtime    MEDICATIONS  (PRN):  acetaminophen  Suppository .. 650 milliGRAM(s) Rectal every 8 hours PRN Temp greater or equal to 38C (100.4F), Moderate Pain (4 - 6)  artificial  tears Solution 1 Drop(s) Both EYES three times a day PRN Dry Eyes  atropine 1% Ophthalmic Solution for SubLingual Use 2 Drop(s) SubLingual four times a day PRN oral secretions2  morphine  - Injectable 2 milliGRAM(s) IV Push every 2 hours PRN pain, distress, dyspnea  OLANZapine Injectable 2.5 milliGRAM(s) IntraMuscular every 6 hours PRN acute agitation    Objective:  Vital Signs Last 24 Hrs  T(C): 36.4 (23 Sep 2018 14:03), Max: 37.1 (22 Sep 2018 17:15)  T(F): 97.6 (23 Sep 2018 14:03), Max: 98.7 (22 Sep 2018 17:15)  HR: 95 (23 Sep 2018 14:03) (89 - 105)  BP: 153/87 (23 Sep 2018 14:03) (144/73 - 163/83)  BP(mean): --  RR: 18 (23 Sep 2018 14:03) (16 - 18)  SpO2: 100% (23 Sep 2018 14:03) (95% - 100%)    Physical Exam:  General: no acute distress lethargic  Eyes: sclera anicteric, pupils equal and reactive to light  ENMT: buccal mucosa moist, pharynx not injected  Neck: supple, trachea midline  Lungs: Decreased no wheeze/rhonchi  Cardiovascular: regular rate and rhythm, S1 S2  Abdomen: soft, nontender, no organomegaly present, bowel sounds normal  Neurological: confused advanced dementia  Skin: no increased ecchymosis/petechiae/purpura  Lymph Nodes: no palpable cervical/supraclavicular lymph nodes enlargements  Extremities: no cyanosis/clubbing/edema      LABS:  No new lab    MICROBIOLOGY:  Culture - Urine (collected 18 Sep 2018 00:17)  Source: .Urine Catheterized  Final Report (18 Sep 2018 22:56):    No growth    Culture - Blood (collected 17 Sep 2018 23:33)  Source: .Blood Blood-Peripheral  Preliminary Report (19 Sep 2018 01:02):    No growth to date.    Culture - Blood (collected 17 Sep 2018 23:33)  Source: .Blood Blood-Peripheral  Preliminary Report (19 Sep 2018 01:02):    No growth to date.    RADIOLOGY & ADDITIONAL STUDIES:    EXAM:  XR CHEST PORTABLE ROUTINE 1V                              PROCEDURE DATE:  09/17/2018      INTERPRETATION:  Clinical information: Altered mental status    Comparison study dated 9/10/2017    Portable study, 1:20 PM    No evidence of infiltrate effusion or congestive failure. Heart size   within normal limits. Hilar regions mediastinal contours and bony thorax   are intact.    IMPRESSION: No active disease.      Assessment :  89 yr old PMH advanced Dementia hypothyroid from group home brought to hospital with   CC agitation and decreased po intake.  Dementia with behavioral disturbance  BRAYDEN sec dehydration resolved  Leukocytosis and fever likely reactive resolved  CXR neg  UA neg  Septic work up negative  Failure to thrive  Doing poorly      Plan :   Monitor off antibiotic  High risk for asp pna  Asp precautions  [facility]  Will sign off       Continue with present regiment.  Appropriate use of antibiotics and adverse effects reviewed.      I have discussed the above plan of care with patient/ family in detail. They expressed understanding of the  treatment plan . Risks, benefits and alternatives discussed in detail. I have asked if they have any questions or concerns and appropriately addressed them to the best of my ability .    > 35 minutes were spent in direct patient care reviewing notes, medications ,labs data/ imaging , discussion with multidisciplinary team.    Thank you for allowing me to participate in care of your patient .    Rik Erwin MD  Infectious Disease  [phone]
AZUL VILLA is a yMale , patient examined and chart reviewed.    INTERVAL HPI/ OVERNIGHT EVENTS:   Afebrile. No distress.    PAST MEDICAL & SURGICAL HISTORY:  Hypothyroid  Dementia with behavioral disturbance  No significant past surgical history      For details regarding the patient's social history, family history, and other miscellaneous elements, please refer the initial infectious diseases consultation and/or the admitting history and physical examination for this admission.    ROS:  Unable to obtain due to : Minimally verbal confused    Current inpatient medications :    ANTIBIOTICS/RELEVANT:  cefTRIAXone   IVPB      cefTRIAXone   IVPB 1 Gram(s) IV Intermittent every 24 hours      acetaminophen  Suppository .. 650 milliGRAM(s) Rectal every 8 hours PRN  dextrose 5%. 1000 milliLiter(s) IV Continuous <Continuous>  enoxaparin Injectable 30 milliGRAM(s) SubCutaneous daily  OLANZapine Injectable 2.5 milliGRAM(s) IntraMuscular every 6 hours PRN  risperiDONE   Tablet 1 milliGRAM(s) Oral at bedtime      Objective:    09-18 @ 07:01  -  09-19 @ 07:00  --------------------------------------------------------  IN: 600 mL / OUT: 0 mL / NET: 600 mL    09-19 @ 07:01  -  09-19 @ 22:11  --------------------------------------------------------  IN: 500 mL / OUT: 0 mL / NET: 500 mL      T(C): 37.1 (09-19-18 @ 21:11), Max: 37.1 (09-19-18 @ 21:11)  HR: 103 (09-19-18 @ 21:11) (65 - 111)  BP: 172/92 (09-19-18 @ 21:11) (123/97 - 172/92)  RR: 16 (09-19-18 @ 21:11) (15 - 17)  SpO2: 98% (09-19-18 @ 21:11) (96% - 100%)  Wt(kg): --      Physical Exam:  General: no acute distress  Eyes: sclera anicteric, pupils equal and reactive to light  ENMT: buccal mucosa moist, pharynx not injected  Neck: supple, trachea midline  Lungs: Decreased no wheeze/rhonchi  Cardiovascular: regular rate and rhythm, S1 S2  Abdomen: soft, nontender, no organomegaly present, bowel sounds normal  Neurological: confused advanced dementia  Skin: no increased ecchymosis/petechiae/purpura  Lymph Nodes: no palpable cervical/supraclavicular lymph nodes enlargements  Extremities: no cyanosis/clubbing/edema      LABS:                          13.4   11.39 )-----------( 200      ( 19 Sep 2018 06:55 )             40.0       09-19    124<L>  |  91<L>  |  12  ----------------------------<  103<H>  4.4   |  20<L>  |  1.18    Ca    8.3<L>      19 Sep 2018 07:29    TPro  6.6  /  Alb  3.3  /  TBili  0.6  /  DBili  x   /  AST  20  /  ALT  14  /  AlkPhos  92  09-19      MICROBIOLOGY:    Culture - Urine (collected 18 Sep 2018 00:17)  Source: .Urine Catheterized  Final Report (18 Sep 2018 22:56):    No growth    Culture - Blood (collected 17 Sep 2018 23:33)  Source: .Blood Blood-Peripheral  Preliminary Report (19 Sep 2018 01:02):    No growth to date.    Culture - Blood (collected 17 Sep 2018 23:33)  Source: .Blood Blood-Peripheral  Preliminary Report (19 Sep 2018 01:02):    No growth to date.    RADIOLOGY & ADDITIONAL STUDIES:    EXAM:  XR CHEST PORTABLE ROUTINE 1V                                  PROCEDURE DATE:  09/17/2018          INTERPRETATION:  Clinical information: Altered mental status    Comparison study dated 9/10/2017    Portable study, 1:20 PM        No evidenceof infiltrate effusion or congestive failure. Heart size   within normal limits. Hilar regions mediastinal contours and bony thorax   are intact.    IMPRESSION: No active disease.      Assessment :  89 yr old PMH advanced Dementia hypothyroid from group home brought to hospital with   CC agitation and decreased po intake.  Dementia with behavioral disturbance  Dehydration  BRAYDEN resolved  Leukocytosis and fever likely reactive resolved  CXR neg  UA neg  Septic work up negative    Plan :   Dc Rocephin   Trend temps and wbc  If pt spikes temp will do CT CAP  High risk for asp pna  Asp precautions       Continue with present regiment.  Appropriate use of antibiotics and adverse effects reviewed.      I have discussed the above plan of care with patient/ family in detail. They expressed understanding of the  treatment plan . Risks, benefits and alternatives discussed in detail. I have asked if they have any questions or concerns and appropriately addressed them to the best of my ability .    > 35 minutes were spent in direct patient care reviewing notes, medications ,labs data/ imaging , discussion with multidisciplinary team.    Thank you for allowing me to participate in care of your patient .    Rik Erwin MD  Infectious Disease  [phone]
Date/Time Patient Seen:  		  Referring MD:   Data Reviewed	       Patient is a 89y old  Male who presents with a chief complaint of AMS, Agitation and Poor Appetite (22 Sep 2018 18:19)    in bed  on IVF    Subjective/HPI     PAST MEDICAL & SURGICAL HISTORY:  Hypothyroid  Dementia with behavioral disturbance  No pertinent past medical history  No significant past surgical history        Medication list         MEDICATIONS  (STANDING):  dextrose 5%. 1000 milliLiter(s) (50 mL/Hr) IV Continuous <Continuous>  enoxaparin Injectable 30 milliGRAM(s) SubCutaneous daily  risperiDONE   Tablet 1 milliGRAM(s) Oral at bedtime    MEDICATIONS  (PRN):  acetaminophen  Suppository .. 650 milliGRAM(s) Rectal every 8 hours PRN Temp greater or equal to 38C (100.4F), Moderate Pain (4 - 6)  artificial  tears Solution 1 Drop(s) Both EYES three times a day PRN Dry Eyes  atropine 1% Ophthalmic Solution for SubLingual Use 2 Drop(s) SubLingual four times a day PRN oral secretions2  morphine  - Injectable 2 milliGRAM(s) IV Push every 2 hours PRN pain, distress, dyspnea  OLANZapine Injectable 2.5 milliGRAM(s) IntraMuscular every 6 hours PRN acute agitation         Vitals log        ICU Vital Signs Last 24 Hrs  T(C): 36.3 (23 Sep 2018 05:10), Max: 37.1 (22 Sep 2018 17:15)  T(F): 97.3 (23 Sep 2018 05:10), Max: 98.7 (22 Sep 2018 17:15)  HR: 92 (23 Sep 2018 05:10) (92 - 105)  BP: 156/84 (23 Sep 2018 05:10) (144/73 - 179/88)  BP(mean): --  ABP: --  ABP(mean): --  RR: 16 (23 Sep 2018 05:10) (16 - 18)  SpO2: 97% (23 Sep 2018 05:10) (94% - 97%)           Input and Output:  I&O's Detail    21 Sep 2018 07:01  -  22 Sep 2018 07:00  --------------------------------------------------------  IN:    dextrose 5%.: 1400 mL  Total IN: 1400 mL    OUT:  Total OUT: 0 mL    Total NET: 1400 mL      22 Sep 2018 07:01  -  23 Sep 2018 06:33  --------------------------------------------------------  IN:    dextrose 5%.: 1100 mL  Total IN: 1100 mL    OUT:  Total OUT: 0 mL    Total NET: 1100 mL          Lab Data                  Review of Systems	      Objective     Physical Examination    heart s1s2  lung dec BS      Pertinent Lab findings & Imaging      Renea:  NO   Adequate UO     I&O's Detail    21 Sep 2018 07:01  -  22 Sep 2018 07:00  --------------------------------------------------------  IN:    dextrose 5%.: 1400 mL  Total IN: 1400 mL    OUT:  Total OUT: 0 mL    Total NET: 1400 mL      22 Sep 2018 07:01  -  23 Sep 2018 06:33  --------------------------------------------------------  IN:    dextrose 5%.: 1100 mL  Total IN: 1100 mL    OUT:  Total OUT: 0 mL    Total NET: 1100 mL               Discussed with:     Cultures:	        Radiology
Date/Time Patient Seen:  		  Referring MD:   Data Reviewed	       Patient is a 89y old  Male who presents with a chief complaint of AMS, Agitation and Poor Appetite (23 Sep 2018 14:33)    in bed  seen and examined  vs and meds reviewed    Subjective/HPI     PAST MEDICAL & SURGICAL HISTORY:  Hypothyroid  Dementia with behavioral disturbance  No pertinent past medical history  No significant past surgical history        Medication list         MEDICATIONS  (STANDING):  dextrose 5%. 1000 milliLiter(s) (50 mL/Hr) IV Continuous <Continuous>  enoxaparin Injectable 30 milliGRAM(s) SubCutaneous daily  risperiDONE   Tablet 1 milliGRAM(s) Oral at bedtime    MEDICATIONS  (PRN):  acetaminophen  Suppository .. 650 milliGRAM(s) Rectal every 8 hours PRN Temp greater or equal to 38C (100.4F), Moderate Pain (4 - 6)  artificial  tears Solution 1 Drop(s) Both EYES three times a day PRN Dry Eyes  atropine 1% Ophthalmic Solution for SubLingual Use 2 Drop(s) SubLingual four times a day PRN oral secretions2  morphine  - Injectable 2 milliGRAM(s) IV Push every 2 hours PRN pain, distress, dyspnea  OLANZapine Injectable 2.5 milliGRAM(s) IntraMuscular every 6 hours PRN acute agitation         Vitals log        ICU Vital Signs Last 24 Hrs  T(C): 36.5 (24 Sep 2018 05:16), Max: 37.1 (23 Sep 2018 17:15)  T(F): 97.7 (24 Sep 2018 05:16), Max: 98.7 (23 Sep 2018 17:15)  HR: 92 (24 Sep 2018 05:16) (83 - 96)  BP: 132/76 (24 Sep 2018 05:16) (132/76 - 168/78)  BP(mean): --  ABP: --  ABP(mean): --  RR: 18 (24 Sep 2018 05:16) (17 - 18)  SpO2: 98% (24 Sep 2018 05:16) (95% - 100%)           Input and Output:  I&O's Detail    23 Sep 2018 07:01  -  24 Sep 2018 07:00  --------------------------------------------------------  IN:    dextrose 5%.: 1200 mL  Total IN: 1200 mL    OUT:  Total OUT: 0 mL    Total NET: 1200 mL          Lab Data                  Review of Systems	      Objective     Physical Examination    heart s1s2  lung dec BS  abd soft      Pertinent Lab findings & Imaging      Renea:  BRIA   Adequate UO     I&O's Detail    23 Sep 2018 07:01  -  24 Sep 2018 07:00  --------------------------------------------------------  IN:    dextrose 5%.: 1200 mL  Total IN: 1200 mL    OUT:  Total OUT: 0 mL    Total NET: 1200 mL               Discussed with:     Cultures:	        Radiology
Patient is a 89y old  Male who presents with a chief complaint of AMS, Agitation and Poor Appetite (18 Sep 2018 11:16)      INTERVAL HPI/OVERNIGHT EVENTS:  Pt is not communicative, per nursing pt pulls on ekg electrodes  waiting for speech and swallow eval,  Pt had low grade fever    Vital Signs Last 24 Hrs  T(C): 36.6 (18 Sep 2018 14:19), Max: 38.2 (17 Sep 2018 20:26)  T(F): 97.8 (18 Sep 2018 14:19), Max: 100.8 (17 Sep 2018 20:26)  HR: 72 (18 Sep 2018 14:19) (72 - 123)  BP: 136/64 (18 Sep 2018 14:19) (131/67 - 170/89)  BP(mean): --  RR: 19 (18 Sep 2018 14:19) (17 - 19)  SpO2: 95% (18 Sep 2018 14:19) (95% - 100%)    Allergies    No Known Allergies    Intolerances        MEDICATIONS  (STANDING):  cefTRIAXone   IVPB      cefTRIAXone   IVPB 1 Gram(s) IV Intermittent every 24 hours  dextrose 5%. 1000 milliLiter(s) (50 mL/Hr) IV Continuous <Continuous>  enoxaparin Injectable 30 milliGRAM(s) SubCutaneous daily  risperiDONE   Tablet 0.25 milliGRAM(s) Oral at bedtime    MEDICATIONS  (PRN):  acetaminophen  Suppository .. 650 milliGRAM(s) Rectal every 8 hours PRN Temp greater or equal to 38C (100.4F), Moderate Pain (4 - 6)      LABORATORY:    CBC Full  -  ( 18 Sep 2018 07:49 )  WBC Count : 10.62 K/uL  Hemoglobin : 12.3 g/dL  Hematocrit : 38.5 %  Platelet Count - Automated : 329 K/uL  Mean Cell Volume : 94.6 fl  Mean Cell Hemoglobin : 30.2 pg  Mean Cell Hemoglobin Concentration : 31.9 gm/dL  Auto Neutrophil # : x  Auto Lymphocyte # : x  Auto Monocyte # : x  Auto Eosinophil # : x  Auto Basophil # : x  Auto Neutrophil % : x  Auto Lymphocyte % : x  Auto Monocyte % : x  Auto Eosinophil % : x  Auto Basophil % : x        Na:   130          09-18 @ 07:49  K: 5.4               BUN/Cr: 17/1.47    Glucose: --         Albumin: 2.9        Ca:  8.4            Phos: --              Na:   136          09-17 @ 13:44  K: 5.5               BUN/Cr: 19/1.57    Glucose: --         Albumin: 3.7        Ca:  9.3            Phos: --                LIVER FUNCTIONS - ( 18 Sep 2018 07:49 )  Alb: 2.9 g/dL / Pro: 6.3 g/dL / ALK PHOS: 78 U/L / ALT: 14 U/L DA / AST: 16 U/L / GGT: x             PT/INR - ( 17 Sep 2018 13:44 )   PT: 11.0 sec;   INR: 1.01 ratio         PTT - ( 17 Sep 2018 13:44 )  PTT:31.8 sec    Urinalysis Basic - ( 17 Sep 2018 13:50 )    Color: Yellow / Appearance: Clear / S.020 / pH: x  Gluc: x / Ketone: Moderate  / Bili: Negative / Urobili: Negative mg/dL   Blood: x / Protein: Negative mg/dL / Nitrite: Negative   Leuk Esterase: Negative / RBC: x / WBC x   Sq Epi: x / Non Sq Epi: x / Bacteria: x              RADIOLOGY & ADDITIONAL TESTS:      PHYSICAL EXAM:  GENERAL: No acute distress   HEAD: Normocephalic  EYES: EOMI, PERRLA, conjunctiva and sclera clear  NECK: Supple, No JVD, Normal thyroid  NERVOUS SYSTEM:  Alert & Oriented X3  CHEST/LUNG: Clear anterior to posterior bilaterally  HEART: Regular rate and rhythm; No murmurs, rubs, or gallops  ABDOMEN: Soft, Nontender, Nondistended; Bowel sounds present  EXTREMITIES:  No edema  LYMPH: No lymphadenopathy noted  SKIN: No rashes or lesions      Consultant(s) Notes Reviewed:  YES         Care Discussed with Consultants/Other Providers   YES          ASSESSESSMENT:      PLAN:
Patient is a 89y old  Male who presents with a chief complaint of AMS, Agitation and Poor Appetite (18 Sep 2018 14:59)      INTERVAL HPI/OVERNIGHT EVENTS:  Pt is confused, speech and swallow could not evaluate  PT could get him out of bed    Vital Signs Last 24 Hrs  T(C): 36.2 (19 Sep 2018 10:03), Max: 37.2 (18 Sep 2018 10:54)  T(F): 97.2 (19 Sep 2018 10:03), Max: 98.9 (18 Sep 2018 10:54)  HR: 65 (19 Sep 2018 10:03) (65 - 111)  BP: 131/68 (19 Sep 2018 00:42) (131/68 - 184/83)  BP(mean): --  RR: 15 (19 Sep 2018 10:03) (15 - 19)  SpO2: 97% (19 Sep 2018 10:03) (94% - 97%)    Allergies    No Known Allergies    Intolerances        MEDICATIONS  (STANDING):  cefTRIAXone   IVPB      cefTRIAXone   IVPB 1 Gram(s) IV Intermittent every 24 hours  dextrose 5%. 1000 milliLiter(s) (50 mL/Hr) IV Continuous <Continuous>  enoxaparin Injectable 30 milliGRAM(s) SubCutaneous daily  risperiDONE   Tablet 1 milliGRAM(s) Oral at bedtime    MEDICATIONS  (PRN):  acetaminophen  Suppository .. 650 milliGRAM(s) Rectal every 8 hours PRN Temp greater or equal to 38C (100.4F), Moderate Pain (4 - 6)  OLANZapine Injectable 2.5 milliGRAM(s) IntraMuscular every 6 hours PRN acute agitation      LABORATORY:    CBC Full  -  ( 19 Sep 2018 06:55 )  WBC Count : 11.39 K/uL  Hemoglobin : 13.4 g/dL  Hematocrit : 40.0 %  Platelet Count - Automated : 200 K/uL  Mean Cell Volume : 90.7 fl  Mean Cell Hemoglobin : 30.4 pg  Mean Cell Hemoglobin Concentration : 33.5 gm/dL  Auto Neutrophil # : x  Auto Lymphocyte # : x  Auto Monocyte # : x  Auto Eosinophil # : x  Auto Basophil # : x  Auto Neutrophil % : x  Auto Lymphocyte % : x  Auto Monocyte % : x  Auto Eosinophil % : x  Auto Basophil % : x        Na:   124          09-19 @ 07:29  K: 4.4               BUN/Cr: 12/1.18    Glucose: --         Albumin: 3.3        Ca:  8.3            Phos: --              Na:   130          09-18 @ 07:49  K: 5.4               BUN/Cr: /1.47    Glucose: --         Albumin: 2.9        Ca:  8.4            Phos: --              Na:   136           @ 13:44  K: 5.5               BUN/Cr: /1.57    Glucose: --         Albumin: 3.7        Ca:  9.3            Phos: --                LIVER FUNCTIONS - ( 19 Sep 2018 07:29 )  Alb: 3.3 g/dL / Pro: 6.6 g/dL / ALK PHOS: 92 U/L / ALT: 14 U/L DA / AST: 20 U/L / GGT: x             PT/INR - ( 17 Sep 2018 13:44 )   PT: 11.0 sec;   INR: 1.01 ratio         PTT - ( 17 Sep 2018 13:44 )  PTT:31.8 sec    Urinalysis Basic - ( 17 Sep 2018 13:50 )    Color: Yellow / Appearance: Clear / S.020 / pH: x  Gluc: x / Ketone: Moderate  / Bili: Negative / Urobili: Negative mg/dL   Blood: x / Protein: Negative mg/dL / Nitrite: Negative   Leuk Esterase: Negative / RBC: x / WBC x   Sq Epi: x / Non Sq Epi: x / Bacteria: x      RADIOLOGY & ADDITIONAL TESTS:      PHYSICAL EXAM:  GENERAL: No acute distress, pt is confused  HEAD: Normocephalic  EYES: EOMI, PERRLA, conjunctiva and sclera clear  NECK: Supple, No JVD, Normal thyroid  NERVOUS SYSTEM:  Alert & Oriented X3  CHEST/LUNG: Clear anterior to posterior bilaterally  HEART: Regular rate and rhythm; No murmurs, rubs, or gallops  ABDOMEN: Soft, Nontender, Nondistended; Bowel sounds present  EXTREMITIES:  No edema  LYMPH: No lymphadenopathy noted  SKIN: Paronychia of L- thumb, Swelling is less than on admission, does groan when pressed      Consultant(s) Notes Reviewed:  YES        Care Discussed with Consultants/Other Providers   YES         ASSESSESSMENT:      PLAN:
Patient is a 89y old  Male who presents with a chief complaint of AMS, Agitation and Poor Appetite (19 Sep 2018 18:11)      INTERVAL HPI/OVERNIGHT EVENTS:  Seen by Speech and swallow, recommended NPO, to avoid aspiration  followed by Dr Erwin, Inf dis--- Antibiotics d/c  pt is awake, responds to pain    Vital Signs Last 24 Hrs  T(C): 37 (20 Sep 2018 10:10), Max: 37.1 (19 Sep 2018 21:11)  T(F): 98.6 (20 Sep 2018 10:10), Max: 98.8 (19 Sep 2018 21:11)  HR: 90 (20 Sep 2018 10:10) (90 - 107)  BP: 147/95 (20 Sep 2018 10:10) (123/97 - 183/99)  BP(mean): --  RR: 20 (20 Sep 2018 10:10) (16 - 20)  SpO2: 99% (20 Sep 2018 10:10) (95% - 100%)    Allergies    No Known Allergies    Intolerances        MEDICATIONS  (STANDING):  dextrose 5%. 1000 milliLiter(s) (50 mL/Hr) IV Continuous <Continuous>  enoxaparin Injectable 30 milliGRAM(s) SubCutaneous daily  risperiDONE   Tablet 1 milliGRAM(s) Oral at bedtime    MEDICATIONS  (PRN):  acetaminophen  Suppository .. 650 milliGRAM(s) Rectal every 8 hours PRN Temp greater or equal to 38C (100.4F), Moderate Pain (4 - 6)  OLANZapine Injectable 2.5 milliGRAM(s) IntraMuscular every 6 hours PRN acute agitation      LABORATORY:    CBC Full  -  ( 19 Sep 2018 06:55 )  WBC Count : 11.39 K/uL  Hemoglobin : 13.4 g/dL  Hematocrit : 40.0 %  Platelet Count - Automated : 200 K/uL  Mean Cell Volume : 90.7 fl  Mean Cell Hemoglobin : 30.4 pg  Mean Cell Hemoglobin Concentration : 33.5 gm/dL  Auto Neutrophil # : x  Auto Lymphocyte # : x  Auto Monocyte # : x  Auto Eosinophil # : x  Auto Basophil # : x  Auto Neutrophil % : x  Auto Lymphocyte % : x  Auto Monocyte % : x  Auto Eosinophil % : x  Auto Basophil % : x        Na:   124          09-19 @ 07:29  K: 4.4               BUN/Cr: 12/1.18    Glucose: --         Albumin: 3.3        Ca:  8.3            Phos: --              Na:   130          09-18 @ 07:49  K: 5.4               BUN/Cr: 17/1.47    Glucose: --         Albumin: 2.9        Ca:  8.4            Phos: --              Na:   136          09-17 @ 13:44  K: 5.5               BUN/Cr: 19/1.57    Glucose: --         Albumin: 3.7        Ca:  9.3            Phos: --                LIVER FUNCTIONS - ( 19 Sep 2018 07:29 )  Alb: 3.3 g/dL / Pro: 6.6 g/dL / ALK PHOS: 92 U/L / ALT: 14 U/L DA / AST: 20 U/L / GGT: x                           RADIOLOGY & ADDITIONAL TESTS:      PHYSICAL EXAM:  GENERAL: No acute distress   HEAD: Normocephalic  EYES: EOMI, PERRLA, conjunctiva and sclera clear  NECK: Supple, No JVD, Normal thyroid  NERVOUS SYSTEM:  Alert & Oriented X3  CHEST/LUNG: Clear anterior to posterior bilaterally  HEART: Regular rate and rhythm; No murmurs, rubs, or gallops  ABDOMEN: Soft, Nontender, Nondistended; Bowel sounds present  EXTREMITIES:  No edema  LYMPH: No lymphadenopathy noted  SKIN: No rashes or lesions      Consultant(s) Notes Reviewed:  YES          Care Discussed with Consultants/Other Providers   YES          ASSESSESSMENT:      PLAN:
Patient is a 89y old  Male who presents with a chief complaint of AMS, Agitation and Poor Appetite (20 Sep 2018 15:57)      INTERVAL HPI/OVERNIGHT EVENTS:  Pt's Healthcare Proxy visiting , She could not feed him last night and feels he has deteriorated  she is discussing with her other partner and decided about DNR/ DNI   Pt is mumbling, responds to pain    Vital Signs Last 24 Hrs  T(C): 36.4 (21 Sep 2018 09:23), Max: 37.3 (20 Sep 2018 21:32)  T(F): 97.6 (21 Sep 2018 09:23), Max: 99.2 (20 Sep 2018 21:32)  HR: 96 (21 Sep 2018 09:23) (90 - 105)  BP: 137/74 (21 Sep 2018 09:23) (108/76 - 175/97)  BP(mean): --  RR: 20 (21 Sep 2018 09:23) (18 - 20)  SpO2: 98% (21 Sep 2018 09:23) (95% - 99%)    Allergies    No Known Allergies    Intolerances        MEDICATIONS  (STANDING):  dextrose 5%. 1000 milliLiter(s) (50 mL/Hr) IV Continuous <Continuous>  enoxaparin Injectable 30 milliGRAM(s) SubCutaneous daily  risperiDONE   Tablet 1 milliGRAM(s) Oral at bedtime    MEDICATIONS  (PRN):  acetaminophen  Suppository .. 650 milliGRAM(s) Rectal every 8 hours PRN Temp greater or equal to 38C (100.4F), Moderate Pain (4 - 6)  OLANZapine Injectable 2.5 milliGRAM(s) IntraMuscular every 6 hours PRN acute agitation      LABORATORY:          Na:   124          09-19 @ 07:29  K: 4.4               BUN/Cr: 12/1.18    Glucose: --         Albumin: 3.3        Ca:  8.3            Phos: --                                  RADIOLOGY & ADDITIONAL TESTS:      PHYSICAL EXAM:  GENERAL: No acute distress   HEAD: Normocephalic  EYES: EOMI, PERRLA, conjunctiva and sclera clear  NECK: Supple, No JVD, Normal thyroid  NERVOUS SYSTEM:  Awake, lethargic, responds to pain  CHEST/LUNG: Clear anterior to posterior bilaterally  HEART: Regular rate and rhythm; No murmurs, rubs, or gallops  ABDOMEN: Soft, Nontender, Nondistended; Bowel sounds present  EXTREMITIES:  No edema  LYMPH: No lymphadenopathy noted  SKIN: No rashes or lesions      Consultant(s) Notes Reviewed:  YES         Care Discussed with Consultants/Other Providers   YES          ASSESSESSMENT:      PLAN:
Patient is a 89y old  Male who presents with a chief complaint of AMS, Agitation and Poor Appetite (21 Sep 2018 11:52)      INTERVAL HPI/OVERNIGHT EVENTS:  Pt is sleeping, home health aide tells me pt was talking to her  Pt looks comfortable    Vital Signs Last 24 Hrs  T(C): 37 (22 Sep 2018 11:06), Max: 37 (22 Sep 2018 11:06)  T(F): 98.6 (22 Sep 2018 11:06), Max: 98.6 (22 Sep 2018 11:06)  HR: 101 (22 Sep 2018 11:06) (98 - 112)  BP: 179/88 (22 Sep 2018 11:06) (105/67 - 179/88)  BP(mean): --  RR: 18 (22 Sep 2018 11:06) (18 - 20)  SpO2: 94% (22 Sep 2018 11:06) (94% - 100%)    Allergies    No Known Allergies    Intolerances        MEDICATIONS  (STANDING):  dextrose 5%. 1000 milliLiter(s) (50 mL/Hr) IV Continuous <Continuous>  enoxaparin Injectable 30 milliGRAM(s) SubCutaneous daily  risperiDONE   Tablet 1 milliGRAM(s) Oral at bedtime    MEDICATIONS  (PRN):  acetaminophen  Suppository .. 650 milliGRAM(s) Rectal every 8 hours PRN Temp greater or equal to 38C (100.4F), Moderate Pain (4 - 6)  OLANZapine Injectable 2.5 milliGRAM(s) IntraMuscular every 6 hours PRN acute agitation      LABORATORY:                              RADIOLOGY & ADDITIONAL TESTS:      PHYSICAL EXAM:  GENERAL: No acute distress   HEAD: Normocephalic  EYES: EOMI, PERRLA, conjunctiva and sclera clear  NECK: Supple, No JVD, Normal thyroid  NERVOUS SYSTEM:  Alert & Oriented X3  CHEST/LUNG: Clear anterior to posterior bilaterally  HEART: Regular rate and rhythm; No murmurs, rubs, or gallops  ABDOMEN: Soft, Nontender, Nondistended; Bowel sounds present  EXTREMITIES:  No edema  LYMPH: No lymphadenopathy noted  SKIN: No rashes or lesions      Consultant(s) Notes Reviewed:  YES          Care Discussed with Consultants/Other Providers   YES          ASSESSESSMENT:      PLAN:
Patient is a 89y old  Male who presents with a chief complaint of AMS, Agitation and Poor Appetite (23 Sep 2018 06:33)      INTERVAL HPI/OVERNIGHT EVENTS:  Pt is lethargic, responds to pain  Seen by Dr Mo for Palliative Care    Vital Signs Last 24 Hrs  T(C): 36.8 (23 Sep 2018 09:24), Max: 37.1 (22 Sep 2018 17:15)  T(F): 98.2 (23 Sep 2018 09:24), Max: 98.7 (22 Sep 2018 17:15)  HR: 89 (23 Sep 2018 09:24) (89 - 105)  BP: 160/95 (23 Sep 2018 09:24) (144/73 - 163/83)  BP(mean): --  RR: 18 (23 Sep 2018 09:24) (16 - 18)  SpO2: 97% (23 Sep 2018 09:24) (94% - 97%)    Allergies    No Known Allergies    Intolerances        MEDICATIONS  (STANDING):  dextrose 5%. 1000 milliLiter(s) (50 mL/Hr) IV Continuous <Continuous>  enoxaparin Injectable 30 milliGRAM(s) SubCutaneous daily  risperiDONE   Tablet 1 milliGRAM(s) Oral at bedtime    MEDICATIONS  (PRN):  acetaminophen  Suppository .. 650 milliGRAM(s) Rectal every 8 hours PRN Temp greater or equal to 38C (100.4F), Moderate Pain (4 - 6)  artificial  tears Solution 1 Drop(s) Both EYES three times a day PRN Dry Eyes  atropine 1% Ophthalmic Solution for SubLingual Use 2 Drop(s) SubLingual four times a day PRN oral secretions2  morphine  - Injectable 2 milliGRAM(s) IV Push every 2 hours PRN pain, distress, dyspnea  OLANZapine Injectable 2.5 milliGRAM(s) IntraMuscular every 6 hours PRN acute agitation      LABORATORY:                              RADIOLOGY & ADDITIONAL TESTS:      PHYSICAL EXAM:  GENERAL: No acute distress   HEAD: Normocephalic  EYES: EOMI, PERRLA, conjunctiva and sclera clear  NECK: Supple, No JVD, Normal thyroid  NERVOUS SYSTEM:  Lethargic, responds to pain  CHEST/LUNG: Fairly clear, Diminished BS at bases   HEART: Regular rate and rhythm; No murmurs, rubs, or gallops  ABDOMEN: Soft, Nontender, Nondistended; Bowel sounds present  EXTREMITIES:  No edema  LYMPH: No lymphadenopathy noted  SKIN: No rashes or lesions      Consultant(s) Notes Reviewed:  YES         Care Discussed with Consultants/Other Providers   YES         ASSESSESSMENT:      PLAN:
Patient is a 89y old  Male who presents with a chief complaint of AMS, Agitation and Poor Appetite (24 Sep 2018 11:50)      INTERVAL HPI/OVERNIGHT EVENTS:  Pt's condition deteriorating, not in distress  Responds to pain      Vital Signs Last 24 Hrs  T(C): 36.8 (25 Sep 2018 13:55), Max: 36.9 (24 Sep 2018 17:06)  T(F): 98.2 (25 Sep 2018 13:55), Max: 98.5 (24 Sep 2018 17:06)  HR: 100 (25 Sep 2018 13:55) (92 - 105)  BP: 130/78 (25 Sep 2018 13:55) (115/72 - 146/82)  BP(mean): --  RR: 16 (25 Sep 2018 13:55) (16 - 18)  SpO2: 96% (25 Sep 2018 13:55) (95% - 98%)    Allergies    No Known Allergies    Intolerances        MEDICATIONS  (STANDING):  dextrose 5%. 1000 milliLiter(s) (50 mL/Hr) IV Continuous <Continuous>  enoxaparin Injectable 30 milliGRAM(s) SubCutaneous daily  risperiDONE   Tablet 1 milliGRAM(s) Oral at bedtime    MEDICATIONS  (PRN):  acetaminophen  Suppository .. 650 milliGRAM(s) Rectal every 8 hours PRN Temp greater or equal to 38C (100.4F), Moderate Pain (4 - 6)  artificial  tears Solution 1 Drop(s) Both EYES three times a day PRN Dry Eyes  atropine 1% Ophthalmic Solution for SubLingual Use 2 Drop(s) SubLingual four times a day PRN oral secretions2  morphine  - Injectable 2 milliGRAM(s) IV Push every 2 hours PRN pain, distress, dyspnea  OLANZapine Injectable 2.5 milliGRAM(s) IntraMuscular every 6 hours PRN acute agitation      LABORATORY:                              RADIOLOGY & ADDITIONAL TESTS:      PHYSICAL EXAM:  GENERAL: No acute distress   HEAD: Normocephalic  EYES: EOMI, PERRLA, conjunctiva and sclera clear  NECK: Supple, No JVD, Normal thyroid  NERVOUS SYSTEM:  Alert & Oriented X3  CHEST/LUNG: Diminished breath sounds, scattered ronchii   HEART: Regular rate and rhythm; No murmurs, rubs, or gallops  ABDOMEN: Soft, Nontender, Nondistended; Bowel sounds present  EXTREMITIES:  No edema  LYMPH: No lymphadenopathy noted  SKIN: No rashes or lesions      Consultant(s) Notes Reviewed:  YES          Care Discussed with Consultants/Other Providers   YES          ASSESSESSMENT:      PLAN:

## 2018-09-25 NOTE — PROGRESS NOTE ADULT - ASSESSMENT
89 yr old w/m was admitted for Dehydration, AMS, Behaviour Disorder, Failure to Thrive  Leukocytosis improved, L-thumb paronychia , swelling is less  Blood and Urine cultures -- No growth to date, seen by Dr Erwin, IV Rocephin discontinued on 9/20/18  Seen by speech and Swallow, Recommended NPO   Dr Mo's Palliative Care and F/U appreciated   Pt accepted for [facility] admission   Will discharge when arrangements made
89 yr old w/m admitted for Dehydration, AMS, Behaviour Disorder, Failure to Thrive  Leukocytosis improved, L-thumb paronychia   Blood and Urine cultures -- No growth to date  Spoke with daughter/ healthcare proxy at length, she is going to send pt's aide,  Willing to take home when stable  Will d/c NPO order, try clear fluids and advance to pureed diet  Pt seen by Dr Colvin, Psych Consult, will put pt on Risperidone 1 mg hs, and Zyprexa prn
89 yr old w/m admitted for Dehydration, AMS, Behaviour Disorder, Failure to Thrive  Leukocytosis improved, L-thumb paronychia , swelling is less  Blood and Urine cultures -- No growth to date, seen by Dr Erwin, IV Rocephin discontinued  Seen by speech and Swallow, Recommended NPO   Spoke with daughter/ healthcare proxy at length, she wants to bring his favorite food and feed him,  made her aware of possible aspiration, but ,she wants to feed him, did not want to talk about feeding tube  Willing to take home when stable   Pt seen by Dr Colvin, Psych Consult, will put pt on Risperidone 1 mg hs, and Zyprexa prn
89 yr old w/m admitted for Dehydration, AMS, Behaviour Disorder, Failure to Thrive  Leukocytosis improved, L-thumb paronychia , swelling is less  Blood and Urine cultures -- No growth to date, seen by Dr Erwin, IV Rocephin discontinued on 9/20/18  Seen by speech and Swallow, Recommended NPO   Dr Mo's Palliative Care and F/U appreciated  Spoke with daughter/ healthcare proxy, Pt accepted for [facility] admission
89 yr old w/m admitted for Dehydration, AMS, Behaviour Disorder, Failure to Thrive  Leukocytosis improved, L-thumb paronychia , swelling is less  Blood and Urine cultures -- No growth to date, seen by Dr Erwin, IV Rocephin discontinued on 9/20/18  Seen by speech and Swallow, Recommended NPO   Spoke with daughter/ healthcare proxy at length, She has decided about DNR / DNI and No tube feeding wants Home Hospice  Willing to take home when arrangements made
89 yr old w/m admitted for Dehydration, AMS, Behaviour Disorder, Failure to Thrive  Leukocytosis improved, L-thumb paronychia , swelling is less  Blood and Urine cultures -- No growth to date, seen by Dr Erwin, IV Rocephin discontinued on 9/20/18  Seen by speech and Swallow, Recommended NPO   Spoke with daughter/ healthcare proxy, claims she is meeting with Hospice tomorrow  Will take Pt home when arrangements made
89 yr old w/m admitted for Dehydration, AMS, Behaviour Disorder,, Failure to Thrive  Leukocytosis improved,  ID consult by Dr Erwin appreciated  Awaiting culture reports, PT zac
89 yr old w/m was admitted for Dehydration, AMS, Behaviour Disorder, Failure to Thrive  Leukocytosis improved, L-thumb paronychia , swelling is less  Blood and Urine cultures -- No growth to date, seen by Dr Erwin, IV Rocephin discontinued on 9/20/18  Seen by speech and Swallow, Recommended NPO   Dr Mo's Palliative Care and F/U appreciated   Pt accepted for [facility] admission   Will discharge when arrangements made  Addendum to yesterdays note,  Spoke with Healthcare Proxy, claims has bed in [facility] and will be transferred tonight or tomorrow  Called by [facility], spoke with Love needed diagnosis --- Advanced Dementia / cerebral degeneration

## 2018-09-26 VITALS
TEMPERATURE: 98 F | OXYGEN SATURATION: 94 % | SYSTOLIC BLOOD PRESSURE: 105 MMHG | DIASTOLIC BLOOD PRESSURE: 65 MMHG | RESPIRATION RATE: 18 BRPM | HEART RATE: 86 BPM

## 2018-09-26 RX ADMIN — ENOXAPARIN SODIUM 30 MILLIGRAM(S): 100 INJECTION SUBCUTANEOUS at 11:54

## 2018-09-26 NOTE — CHART NOTE - NSCHARTNOTEFT_GEN_A_CORE
Assessment:         89 yr old w/m was admitted for Dehydration, AMS, Behaviour Disorder, Failure to Thrive. Pt failed swallow test and SLP recommended NPO. HCP did not want enteral feeding and patient has been accepted to [facility]. Plan is d/c today or tomorrow. Dx  Advanced Dementia / cerebral degeneration per MD note.  Pt remains NPO and on comfort measures. Maximize comofrt.          Factors impacting intake: [ ] none [ ] nausea  [ ] vomiting [ ] diarrhea [ ] constipation  [ ]chewing problems [x ] swallowing issues  [ ] other:     Diet Presciption: Diet, NPO (09-19-18 @ 13:32)    Intake: NA    Current Weight:   % Weight Change    Pertinent Medications: MEDICATIONS  (STANDING):  dextrose 5%. 1000 milliLiter(s) (50 mL/Hr) IV Continuous <Continuous>  enoxaparin Injectable 30 milliGRAM(s) SubCutaneous daily  risperiDONE   Tablet 1 milliGRAM(s) Oral at bedtime    MEDICATIONS  (PRN):  acetaminophen  Suppository .. 650 milliGRAM(s) Rectal every 8 hours PRN Temp greater or equal to 38C (100.4F), Moderate Pain (4 - 6)  artificial  tears Solution 1 Drop(s) Both EYES three times a day PRN Dry Eyes  atropine 1% Ophthalmic Solution for SubLingual Use 2 Drop(s) SubLingual four times a day PRN oral secretions2  morphine  - Injectable 2 milliGRAM(s) IV Push every 2 hours PRN pain, distress, dyspnea  OLANZapine Injectable 2.5 milliGRAM(s) IntraMuscular every 6 hours PRN acute agitation    Pertinent Labs:      CAPILLARY BLOOD GLUCOSE        Skin:     Estimated Needs:   [ ] no change since previous assessment  [ ] recalculated:     Previous Nutrition Diagnosis:   [ ] Inadequate Energy Intake [ ]Inadequate Oral Intake [ ] Excessive Energy Intake   [ ] Underweight [ ] Increased Nutrient Needs [ ] Overweight/Obesity   [ ] Altered GI Function [ ] Unintended Weight Loss [ ] Food & Nutrition Related Knowledge Deficit [x ] Malnutrition     Nutrition Diagnosis is [ ] ongoing  [ ] resolved [ ] not applicable     New Nutrition Diagnosis: [ ] not applicable       Interventions:   Recommend  [ ] Change Diet To:  [ ] Nutrition Supplement  [ ] Nutrition Support  [ ] Other: Pt to be d/c ed to Hospice: Continue NPO    Monitoring and Evaluation:   [ ] PO intake [ x ] Tolerance to diet prescription [ x ] weights [ x ] labs[ x ] follow up per protocol  [ ] other: Assessment:         89 yr old w/m was admitted for Dehydration, AMS, Behaviour Disorder, Failure to Thrive. Pt failed swallow test and SLP recommended NPO. HCP did not want enteral feeding and patient has been accepted to [facility]. Plan is d/c today or tomorrow. Dx  Advanced Dementia / cerebral degeneration per MD note.  Pt remains NPO and on comfort measures. Maximize comofrt.          Factors impacting intake: [ ] none [ ] nausea  [ ] vomiting [ ] diarrhea [ ] constipation  [ ]chewing problems [x ] swallowing issues  [ ] other:     Diet Presciption: Diet, NPO (09-19-18 @ 13:32)    Intake: NA    Current Weight: 106#  % Weight Change    Pertinent Medications: MEDICATIONS  (STANDING):  dextrose 5%. 1000 milliLiter(s) (50 mL/Hr) IV Continuous <Continuous>  enoxaparin Injectable 30 milliGRAM(s) SubCutaneous daily  risperiDONE   Tablet 1 milliGRAM(s) Oral at bedtime    MEDICATIONS  (PRN):  acetaminophen  Suppository .. 650 milliGRAM(s) Rectal every 8 hours PRN Temp greater or equal to 38C (100.4F), Moderate Pain (4 - 6)  artificial  tears Solution 1 Drop(s) Both EYES three times a day PRN Dry Eyes  atropine 1% Ophthalmic Solution for SubLingual Use 2 Drop(s) SubLingual four times a day PRN oral secretions2  morphine  - Injectable 2 milliGRAM(s) IV Push every 2 hours PRN pain, distress, dyspnea  OLANZapine Injectable 2.5 milliGRAM(s) IntraMuscular every 6 hours PRN acute agitation    Pertinent Labs:      CAPILLARY BLOOD GLUCOSE        Skin: b/l heel and sacral p/u's stage I    Estimated Needs:   [ ] no change since previous assessment  [ ] recalculated:     Previous Nutrition Diagnosis:   [ ] Inadequate Energy Intake [ ]Inadequate Oral Intake [ ] Excessive Energy Intake   [ ] Underweight [ ] Increased Nutrient Needs [ ] Overweight/Obesity   [ ] Altered GI Function [ ] Unintended Weight Loss [ ] Food & Nutrition Related Knowledge Deficit [x ] Malnutrition     Nutrition Diagnosis is [ ] ongoing  [ ] resolved [ ] not applicable     New Nutrition Diagnosis: [ ] not applicable       Interventions:   Recommend  [ ] Change Diet To:  [ ] Nutrition Supplement  [ ] Nutrition Support  [ ] Other: Pt to be d/c ed to Hospice: Continue NPO    Monitoring and Evaluation:   [ ] PO intake [ x ] Tolerance to diet prescription [ x ] weights [ x ] labs[ x ] follow up per protocol  [ ] other:

## 2018-10-25 PROCEDURE — 84443 ASSAY THYROID STIM HORMONE: CPT

## 2018-10-25 PROCEDURE — 85610 PROTHROMBIN TIME: CPT

## 2018-10-25 PROCEDURE — 96360 HYDRATION IV INFUSION INIT: CPT

## 2018-10-25 PROCEDURE — 87086 URINE CULTURE/COLONY COUNT: CPT

## 2018-10-25 PROCEDURE — 83605 ASSAY OF LACTIC ACID: CPT

## 2018-10-25 PROCEDURE — 70450 CT HEAD/BRAIN W/O DYE: CPT

## 2018-10-25 PROCEDURE — 87040 BLOOD CULTURE FOR BACTERIA: CPT

## 2018-10-25 PROCEDURE — 83690 ASSAY OF LIPASE: CPT

## 2018-10-25 PROCEDURE — 99285 EMERGENCY DEPT VISIT HI MDM: CPT | Mod: 25

## 2018-10-25 PROCEDURE — 81003 URINALYSIS AUTO W/O SCOPE: CPT

## 2018-10-25 PROCEDURE — 96361 HYDRATE IV INFUSION ADD-ON: CPT

## 2018-10-25 PROCEDURE — 93005 ELECTROCARDIOGRAM TRACING: CPT

## 2018-10-25 PROCEDURE — 97161 PT EVAL LOW COMPLEX 20 MIN: CPT

## 2018-10-25 PROCEDURE — 85027 COMPLETE CBC AUTOMATED: CPT

## 2018-10-25 PROCEDURE — 71045 X-RAY EXAM CHEST 1 VIEW: CPT

## 2018-10-25 PROCEDURE — 80053 COMPREHEN METABOLIC PANEL: CPT

## 2018-10-25 PROCEDURE — 36415 COLL VENOUS BLD VENIPUNCTURE: CPT

## 2018-10-25 PROCEDURE — 97530 THERAPEUTIC ACTIVITIES: CPT

## 2018-10-25 PROCEDURE — 96372 THER/PROPH/DIAG INJ SC/IM: CPT

## 2018-10-25 PROCEDURE — 85730 THROMBOPLASTIN TIME PARTIAL: CPT

## 2018-10-25 PROCEDURE — 97110 THERAPEUTIC EXERCISES: CPT

## 2019-10-30 NOTE — ED ADULT TRIAGE NOTE - CHIEF COMPLAINT QUOTE
increasing weakness for 2 days, had a bad cold over a week ago Normal rate, regular rhythm.  Heart sounds S1, S2.  No murmurs, rubs or gallops.

## 2021-05-24 NOTE — SWALLOW BEDSIDE ASSESSMENT ADULT - SWALLOW EVAL: CURRENT DIET
NPO except sips of water Preface: I requested the records from the following providers. Detail Level: Zone Providers To Request Records From: surgeon [facility]

## 2021-07-10 NOTE — ED ADULT TRIAGE NOTE - WEIGHT IN KG
Patient AOx1.  No signs of distress, Wanderguard on patient.  Safety precautions in place. [facility]                 1 RN Skin Assessment completed.   Patient's risk of skin breakdown: Low     Devices in place and skin assessed under:   []? Pulse Ox  []? PIV []? Central Line []? SCDs []?Purewick  []? Mackenzie  []?Condom Cath   []? BMS        []?  Cortrak   []?  Oxymask   []? Bipap    []? Nasal Canula   []? N/A   []? Other(specify):      Right Ear  [x]? WDL                or           []? Skin issue present (please provide assessment/description below)     Left Ear  [x]? WDL                or           []? Skin issue present (please provide assessment/description below)     Right Elbow:  [x]? WDL               or           []? Skin issue present (please provide assessment/description below)     Left Elbow:   [x]? WDL                or           []? Skin issue present (please provide assessment/description below)     Coccyx/Buttocks:  [x]? WDL                or           []? Skin issue present (please provide assessment/description below)     Right Heel/Foot/Toes:  [x]? WDL                or           []? Skin issue present (please provide assessment/description below)     Left Heel/Foot/Toes:   [x]? WDL              or           []? Skin issue present (please provide assessment/description below)        **Describe any other skin issues in areas not already listed from above list:   [x]? N/A     Interventions In Place: Pillows, Waffle Overlay, pressure redistribution mattress     **If any new or digressing skin issues are found, fill out the selection below.     Possible Skin Injury No  Pictures Uploaded Into Epic: N/A  Wound Consult Placed: N/A  RN Wound Prevention Protocol Ordered: No    What new preventative interventions did you add? N/A   72.6

## 2022-05-18 NOTE — ED ADULT NURSE NOTE - PRIMARY CARE PROVIDER
"Chief Complaint   Patient presents with     New Patient     Perianal HS       Vitals:    05/18/22 1514   BP: 111/79   BP Location: Left arm   Patient Position: Sitting   Cuff Size: Adult Large   Pulse: 64   SpO2: 98%   Weight: 88.1 kg (194 lb 4.8 oz)   Height: 1.702 m (5' 7\")       Body mass index is 30.43 kg/m .                          Anya Deutsch, EMT    "
Dr. Iqbal

## 2023-10-19 NOTE — PROGRESS NOTE ADULT - PROBLEM SELECTOR PLAN 5
Addended by: INOCENTE MELGOZA on: 10/19/2023 06:34 PM     Modules accepted: Orders    
support and care  placement pending  at risk for recurrent asp pna  pt is DNR

## 2024-08-07 NOTE — PROGRESS NOTE ADULT - SUBJECTIVE AND OBJECTIVE BOX
AZUL VILLA is a 88yMale , patient examined and chart reviewed.    INTERVAL HPI/ OVERNIGHT EVENTS:  No events. Doing well.  Afebrile. Sitting in chair.    Past Medical History--  PAST MEDICAL & SURGICAL HISTORY:  Dementia with behavioral disturbance  No significant past surgical history      For details regarding the patient's social history, family history, and other miscellaneous elements, please refer the initial infectious diseases consultation and/or the admitting history and physical examination for this admission.    ROS:  CONSTITUTIONAL:  Negative fever or chills  EYES:  Negative  blurry vision or double vision  CARDIOVASCULAR:  Negative for chest pain or palpitations  RESPIRATORY:  Negative for cough, wheezing, or SOB   GASTROINTESTINAL:  Negative for nausea, vomiting, diarrhea, constipation, or abdominal pain  GENITOURINARY:  Negative frequency, urgency , dysuria or hematuria   NEUROLOGIC:  No headache, confusion, dizziness, lightheadedness  All other systems were reviewed and are negative       Current inpatient medications :    ANTIBIOTICS/RELEVANT:  NONE    heparin  Injectable 5000 Unit(s) SubCutaneous every 12 hours  risperiDONE   Tablet 0.25 milliGRAM(s) Oral at bedtime  acetaminophen    Suspension. 650 milliGRAM(s) Oral every 8 hours PRN  acetaminophen  Suppository 650 milliGRAM(s) Rectal every 6 hours PRN  ALBUTerol    0.083% 2.5 milliGRAM(s) Nebulizer every 12 hours  lidocaine 5% Ointment 1 Application(s) Topical daily PRN  hydrocortisone 1% Cream 1 Application(s) Topical two times a day  lidocaine   Patch 1 Patch Transdermal daily      Objective:    09-10 @ 07:01 - 09-11 @ 07:00  --------------------------------------------------------  IN: 2025 mL / OUT: 0 mL / NET: 2025 mL    09-11 @ 07:01 - 09-11 @ 14:25  --------------------------------------------------------  IN: 320 mL / OUT: 0 mL / NET: 320 mL      T(C): 36.7 (09-11-17 @ 12:38), Max: 37 (09-10-17 @ 20:00)  HR: 87 (09-11-17 @ 13:03) (76 - 94)  BP: 116/64 (09-11-17 @ 13:03) (100/55 - 138/72)  RR: 16 (09-11-17 @ 13:03) (16 - 30)  SpO2: 99% (09-11-17 @ 13:03) (97% - 100%)  Wt(kg): --    Physical Exam:  GEN: NAD, pleasant  HEENT: normocephalic and atraumatic. EOMI. JOSE ALFREDO. Moist mucosa. Clear Posterior pharynx.  NECK: Supple. No carotid bruits.  No lymphadenopathy or thyromegaly.  LUNGS: Decreased to auscultation.  HEART: Regular rate and rhythm without murmur.  ABDOMEN: Soft, nontender, and nondistended.  Positive bowel sounds.  No hepatosplenomegaly was noted.  EXTREMITIES: Without any cyanosis, clubbing, rash, lesions + edema.  NEUROLOGIC: confused No focal neurological deficits   SKIN: Rash better      LABS:                        12.3   8.3   )-----------( 312      ( 11 Sep 2017 07:03 )             36.0       09-11    138  |  104  |  14  ----------------------------<  84  3.8   |  25  |  1.87<H>    Ca    7.8<L>      11 Sep 2017 07:03    TPro  5.9<L>  /  Alb  2.0<L>  /  TBili  0.4  /  DBili  x   /  AST  22  /  ALT  12  /  AlkPhos  71  09-11    RADIOLOGY:    EXAM:  CHEST PORTABLE ROUTINE                            PROCEDURE DATE:  09/10/2017      INTERPRETATION:  Clinical information: Cough    Portable study, 1:50 PM    Comparison exam dated 9/9/2017.    Cardiac monitor leads present.    No significant change in the appearance of the chest since the prior   study. Airspace disease right lung base. Airspace disease left lung base   obscuring medial aspect left hemidiaphragm. No vascular congestion. No   effusion. Heart size within normal limits. No acute osseous abnormalities.    IMPRESSION: No significant change since prior exam.      Assessment :   88 year old male Dementia admitted with mental status change and BRAYDEN   Sp severe dehydration with hypernatremia   Enterovirus/Rhinovirus infection   Rash likely sec viral illness  Sp New fever likely aspiration pneumonia  CXR noted  Seen by speech - diet modified  BRAYDEN improving    Plan :   Sp Zosyn   Aspiration precautions  Monitor renal fx  Increase activity  DNRDNI  Dc to rehab  Stable from ID standpoint    Critical care time greater then 35 minutes reviewing notes, labs data/ imaging , discussion with multidisciplinary team.    Thank you for allowing me to participate in care of your patient .    Rik Erwin MD  Infectious Disease  [phone] Detail Level: Detailed Quality 226: Preventive Care And Screening: Tobacco Use: Screening And Cessation Intervention: Patient screened for tobacco use and is an ex/non-smoker